# Patient Record
Sex: MALE | Race: WHITE | NOT HISPANIC OR LATINO | Employment: OTHER
[De-identification: names, ages, dates, MRNs, and addresses within clinical notes are randomized per-mention and may not be internally consistent; named-entity substitution may affect disease eponyms.]

---

## 2017-01-02 ENCOUNTER — RECORDS - HEALTHEAST (OUTPATIENT)
Dept: ADMINISTRATIVE | Facility: OTHER | Age: 72
End: 2017-01-02

## 2017-01-09 ENCOUNTER — COMMUNICATION - HEALTHEAST (OUTPATIENT)
Dept: NURSING | Facility: CLINIC | Age: 72
End: 2017-01-09

## 2017-03-12 ENCOUNTER — COMMUNICATION - HEALTHEAST (OUTPATIENT)
Dept: FAMILY MEDICINE | Facility: CLINIC | Age: 72
End: 2017-03-12

## 2017-03-12 DIAGNOSIS — E11.9 TYPE 2 DIABETES MELLITUS WITHOUT COMPLICATION (H): ICD-10-CM

## 2017-03-15 ENCOUNTER — RECORDS - HEALTHEAST (OUTPATIENT)
Dept: ADMINISTRATIVE | Facility: OTHER | Age: 72
End: 2017-03-15

## 2017-05-08 ENCOUNTER — COMMUNICATION - HEALTHEAST (OUTPATIENT)
Dept: FAMILY MEDICINE | Facility: CLINIC | Age: 72
End: 2017-05-08

## 2017-05-08 DIAGNOSIS — E11.9 TYPE 2 DIABETES MELLITUS WITHOUT COMPLICATION (H): ICD-10-CM

## 2017-05-10 ENCOUNTER — OFFICE VISIT - HEALTHEAST (OUTPATIENT)
Dept: FAMILY MEDICINE | Facility: CLINIC | Age: 72
End: 2017-05-10

## 2017-05-10 DIAGNOSIS — E78.00 PURE HYPERCHOLESTEROLEMIA: ICD-10-CM

## 2017-05-10 DIAGNOSIS — R80.9 PROTEINURIA: ICD-10-CM

## 2017-05-10 DIAGNOSIS — E11.9 TYPE 2 DIABETES MELLITUS WITHOUT COMPLICATION, WITHOUT LONG-TERM CURRENT USE OF INSULIN (H): ICD-10-CM

## 2017-05-10 DIAGNOSIS — N28.9 DISORDER OF KIDNEY AND URETER: ICD-10-CM

## 2017-05-10 DIAGNOSIS — I10 ESSENTIAL HYPERTENSION: ICD-10-CM

## 2017-05-10 DIAGNOSIS — G47.33 OSA ON CPAP: ICD-10-CM

## 2017-05-10 DIAGNOSIS — E66.9 OBESITY, UNSPECIFIED: ICD-10-CM

## 2017-05-10 LAB
HBA1C MFR BLD: 11.4 % (ref 3.5–6)
LDLC SERPL CALC-MCNC: 120 MG/DL

## 2017-05-10 ASSESSMENT — MIFFLIN-ST. JEOR: SCORE: 1944.87

## 2017-05-11 ENCOUNTER — COMMUNICATION - HEALTHEAST (OUTPATIENT)
Dept: FAMILY MEDICINE | Facility: CLINIC | Age: 72
End: 2017-05-11

## 2017-05-19 ENCOUNTER — AMBULATORY - HEALTHEAST (OUTPATIENT)
Dept: FAMILY MEDICINE | Facility: CLINIC | Age: 72
End: 2017-05-19

## 2017-05-19 ENCOUNTER — AMBULATORY - HEALTHEAST (OUTPATIENT)
Dept: EDUCATION SERVICES | Facility: CLINIC | Age: 72
End: 2017-05-19

## 2017-05-19 DIAGNOSIS — E11.9 TYPE 2 DIABETES MELLITUS WITHOUT COMPLICATION (H): ICD-10-CM

## 2017-05-19 DIAGNOSIS — E08.65 DIABETES MELLITUS DUE TO UNDERLYING CONDITION WITH HYPERGLYCEMIA (H): ICD-10-CM

## 2017-05-22 ENCOUNTER — COMMUNICATION - HEALTHEAST (OUTPATIENT)
Dept: FAMILY MEDICINE | Facility: CLINIC | Age: 72
End: 2017-05-22

## 2017-05-24 ENCOUNTER — AMBULATORY - HEALTHEAST (OUTPATIENT)
Dept: FAMILY MEDICINE | Facility: CLINIC | Age: 72
End: 2017-05-24

## 2017-05-31 ENCOUNTER — AMBULATORY - HEALTHEAST (OUTPATIENT)
Dept: FAMILY MEDICINE | Facility: CLINIC | Age: 72
End: 2017-05-31

## 2017-06-07 ENCOUNTER — AMBULATORY - HEALTHEAST (OUTPATIENT)
Dept: FAMILY MEDICINE | Facility: CLINIC | Age: 72
End: 2017-06-07

## 2017-06-07 ENCOUNTER — COMMUNICATION - HEALTHEAST (OUTPATIENT)
Dept: FAMILY MEDICINE | Facility: CLINIC | Age: 72
End: 2017-06-07

## 2017-06-14 ENCOUNTER — AMBULATORY - HEALTHEAST (OUTPATIENT)
Dept: EDUCATION SERVICES | Facility: CLINIC | Age: 72
End: 2017-06-14

## 2017-06-14 DIAGNOSIS — E11.9 TYPE 2 DIABETES MELLITUS WITHOUT COMPLICATION (H): ICD-10-CM

## 2017-06-14 DIAGNOSIS — E08.65 DIABETES MELLITUS DUE TO UNDERLYING CONDITION WITH HYPERGLYCEMIA (H): ICD-10-CM

## 2017-07-06 ENCOUNTER — AMBULATORY - HEALTHEAST (OUTPATIENT)
Dept: FAMILY MEDICINE | Facility: CLINIC | Age: 72
End: 2017-07-06

## 2017-08-28 ENCOUNTER — AMBULATORY - HEALTHEAST (OUTPATIENT)
Dept: EDUCATION SERVICES | Facility: CLINIC | Age: 72
End: 2017-08-28

## 2017-08-28 DIAGNOSIS — E08.65 DIABETES MELLITUS DUE TO UNDERLYING CONDITION WITH HYPERGLYCEMIA (H): ICD-10-CM

## 2017-08-31 ENCOUNTER — COMMUNICATION - HEALTHEAST (OUTPATIENT)
Dept: FAMILY MEDICINE | Facility: CLINIC | Age: 72
End: 2017-08-31

## 2017-08-31 ENCOUNTER — AMBULATORY - HEALTHEAST (OUTPATIENT)
Dept: LAB | Facility: CLINIC | Age: 72
End: 2017-08-31

## 2017-08-31 DIAGNOSIS — E08.65 DIABETES MELLITUS DUE TO UNDERLYING CONDITION WITH HYPERGLYCEMIA (H): ICD-10-CM

## 2017-08-31 LAB — HBA1C MFR BLD: 8.8 % (ref 3.5–6)

## 2017-10-18 ENCOUNTER — AMBULATORY - HEALTHEAST (OUTPATIENT)
Dept: EDUCATION SERVICES | Facility: CLINIC | Age: 72
End: 2017-10-18

## 2017-10-25 ENCOUNTER — OFFICE VISIT - HEALTHEAST (OUTPATIENT)
Dept: FAMILY MEDICINE | Facility: CLINIC | Age: 72
End: 2017-10-25

## 2017-10-25 DIAGNOSIS — E78.00 PURE HYPERCHOLESTEROLEMIA: ICD-10-CM

## 2017-10-25 DIAGNOSIS — Z12.11 COLON CANCER SCREENING: ICD-10-CM

## 2017-10-25 DIAGNOSIS — E11.9 TYPE 2 DIABETES MELLITUS WITHOUT COMPLICATION (H): ICD-10-CM

## 2017-10-25 DIAGNOSIS — R20.1 HYPESTHESIA: ICD-10-CM

## 2017-10-26 ENCOUNTER — COMMUNICATION - HEALTHEAST (OUTPATIENT)
Dept: FAMILY MEDICINE | Facility: CLINIC | Age: 72
End: 2017-10-26

## 2017-11-02 ENCOUNTER — AMBULATORY - HEALTHEAST (OUTPATIENT)
Dept: FAMILY MEDICINE | Facility: CLINIC | Age: 72
End: 2017-11-02

## 2017-11-02 ENCOUNTER — COMMUNICATION - HEALTHEAST (OUTPATIENT)
Dept: FAMILY MEDICINE | Facility: CLINIC | Age: 72
End: 2017-11-02

## 2017-11-03 ENCOUNTER — COMMUNICATION - HEALTHEAST (OUTPATIENT)
Dept: FAMILY MEDICINE | Facility: CLINIC | Age: 72
End: 2017-11-03

## 2017-11-03 DIAGNOSIS — E78.00 PURE HYPERCHOLESTEROLEMIA: ICD-10-CM

## 2017-11-06 ENCOUNTER — HOSPITAL ENCOUNTER (OUTPATIENT)
Dept: MRI IMAGING | Facility: HOSPITAL | Age: 72
Discharge: HOME | End: 2017-11-06
Attending: FAMILY MEDICINE

## 2017-11-06 DIAGNOSIS — R20.1 HYPESTHESIA: ICD-10-CM

## 2017-11-08 ENCOUNTER — COMMUNICATION - HEALTHEAST (OUTPATIENT)
Dept: FAMILY MEDICINE | Facility: CLINIC | Age: 72
End: 2017-11-08

## 2017-11-29 ENCOUNTER — RECORDS - HEALTHEAST (OUTPATIENT)
Dept: ADMINISTRATIVE | Facility: OTHER | Age: 72
End: 2017-11-29

## 2017-12-13 ENCOUNTER — COMMUNICATION - HEALTHEAST (OUTPATIENT)
Dept: FAMILY MEDICINE | Facility: CLINIC | Age: 72
End: 2017-12-13

## 2017-12-13 DIAGNOSIS — E11.9 DIABETES (H): ICD-10-CM

## 2018-01-02 ENCOUNTER — RECORDS - HEALTHEAST (OUTPATIENT)
Dept: ADMINISTRATIVE | Facility: OTHER | Age: 73
End: 2018-01-02

## 2018-01-04 ENCOUNTER — RECORDS - HEALTHEAST (OUTPATIENT)
Dept: ADMINISTRATIVE | Facility: OTHER | Age: 73
End: 2018-01-04

## 2018-01-08 ENCOUNTER — HOSPITAL ENCOUNTER (OUTPATIENT)
Dept: CARDIOLOGY | Facility: HOSPITAL | Age: 73
Discharge: HOME | End: 2018-01-08
Attending: PSYCHIATRY & NEUROLOGY

## 2018-01-08 DIAGNOSIS — I63.81 LACUNAR INFARCTION (H): ICD-10-CM

## 2018-01-08 DIAGNOSIS — I63.9 CEREBRAL INFARCTION, UNSPECIFIED (H): ICD-10-CM

## 2018-01-08 LAB
AORTIC ROOT: 3.6 CM
BSA FOR ECHO PROCEDURE: 2.45 M2
CV BLOOD PRESSURE: NORMAL MMHG
CV ECHO HEIGHT: 73.5 IN
CV ECHO WEIGHT: 256 LBS
DOP CALC LVOT AREA: 3.46 CM2
DOP CALC LVOT DIAMETER: 2.1 CM
DOP CALC LVOT PEAK VEL: 118 CM/S
DOP CALC LVOT STROKE VOLUME: 91.7 CM3
DOP CALC MV VTI: 40.7 CM
DOP CALCLVOT PEAK VEL VTI: 26.5 CM
EJECTION FRACTION: 57 % (ref 55–75)
FRACTIONAL SHORTENING: 49.5 % (ref 28–44)
INTERVENTRICULAR SEPTUM IN END DIASTOLE: 1.39 CM (ref 0.6–1)
IVS/PW RATIO: 1.1
LA AREA 1: 21.4 CM2
LA AREA 2: 27.2 CM2
LEFT ATRIUM LENGTH: 5.8 CM
LEFT ATRIUM SIZE: 4.1 CM
LEFT ATRIUM TO AORTIC ROOT RATIO: 1.14 NO UNITS
LEFT ATRIUM VOLUME INDEX: 34.8 ML/M2
LEFT ATRIUM VOLUME: 85.3 ML
LEFT VENTRICLE CARDIAC INDEX: 2.7 L/MIN/M2
LEFT VENTRICLE CARDIAC OUTPUT: 6.7 L/MIN
LEFT VENTRICLE DIASTOLIC VOLUME INDEX: 28.3 CM3/M2 (ref 34–74)
LEFT VENTRICLE DIASTOLIC VOLUME: 69.3 CM3 (ref 62–150)
LEFT VENTRICLE HEART RATE: 73 BPM
LEFT VENTRICLE MASS INDEX: 77.7 G/M2
LEFT VENTRICLE SYSTOLIC VOLUME INDEX: 12 CM3/M2 (ref 11–31)
LEFT VENTRICLE SYSTOLIC VOLUME: 29.5 CM3 (ref 21–61)
LEFT VENTRICULAR INTERNAL DIMENSION IN DIASTOLE: 4.04 CM (ref 4.2–5.8)
LEFT VENTRICULAR INTERNAL DIMENSION IN SYSTOLE: 2.04 CM (ref 2.5–4)
LEFT VENTRICULAR MASS: 190.4 G
LEFT VENTRICULAR OUTFLOW TRACT MEAN GRADIENT: 3 MMHG
LEFT VENTRICULAR OUTFLOW TRACT MEAN VELOCITY: 79.4 CM/S
LEFT VENTRICULAR OUTFLOW TRACT PEAK GRADIENT: 6 MMHG
LEFT VENTRICULAR POSTERIOR WALL IN END DIASTOLE: 1.22 CM (ref 0.6–1)
LV STROKE VOLUME INDEX: 37.4 ML/M2
MITRAL VALVE DECELERATION SLOPE: 3210 MM/S2
MITRAL VALVE E/A RATIO: 0.8
MITRAL VALVE MEAN INFLOW VELOCITY: 88.7 CM/S
MITRAL VALVE PEAK VELOCITY: 138 CM/S
MITRAL VALVE PRESSURE HALF-TIME: 111 MS
MV AREA VTI: 2.25 CM2
MV AVERAGE E/E' RATIO: 19.6 CM/S
MV DECELERATION TIME: 327 MS
MV E'TISSUE VEL-LAT: 6.09 CM/S
MV E'TISSUE VEL-MED: 4.93 CM/S
MV LATERAL E/E' RATIO: 17.7
MV MEAN GRADIENT: 4 MMHG
MV MEDIAL E/E' RATIO: 21.9
MV PEAK A VELOCITY: 135 CM/S
MV PEAK E VELOCITY: 108 CM/S
MV PEAK GRADIENT: 7.6 MMHG
MV VALVE AREA BY CONTINUITY EQUATION: 2.3 CM2
MV VALVE AREA PRESSURE 1/2 METHOD: 2 CM2
NUC REST DIASTOLIC VOLUME INDEX: 4096 LBS
NUC REST SYSTOLIC VOLUME INDEX: 73.5 IN
PR MAX PG: 5 MMHG
PR PEAK VELOCITY: 113 CM/S
TRICUSPID REGURGITATION PEAK PRESSURE GRADIENT: 19.9 MMHG
TRICUSPID VALVE PEAK REGURGITANT VELOCITY: 223 CM/S

## 2018-01-08 ASSESSMENT — MIFFLIN-ST. JEOR: SCORE: 1963.02

## 2018-01-10 ENCOUNTER — RECORDS - HEALTHEAST (OUTPATIENT)
Dept: ADMINISTRATIVE | Facility: OTHER | Age: 73
End: 2018-01-10

## 2018-01-30 ENCOUNTER — HOSPITAL ENCOUNTER (OUTPATIENT)
Dept: ULTRASOUND IMAGING | Facility: HOSPITAL | Age: 73
Discharge: HOME | End: 2018-01-30
Attending: PSYCHIATRY & NEUROLOGY

## 2018-01-30 DIAGNOSIS — I63.81 LACUNAR INFARCTION (H): ICD-10-CM

## 2018-01-30 DIAGNOSIS — I63.9 CEREBRAL INFARCTION, UNSPECIFIED (H): ICD-10-CM

## 2018-02-02 ENCOUNTER — RECORDS - HEALTHEAST (OUTPATIENT)
Dept: ADMINISTRATIVE | Facility: OTHER | Age: 73
End: 2018-02-02

## 2018-02-06 ENCOUNTER — RECORDS - HEALTHEAST (OUTPATIENT)
Dept: LAB | Facility: HOSPITAL | Age: 73
End: 2018-02-06

## 2018-02-06 LAB
CHOLEST SERPL-MCNC: 105 MG/DL
FASTING STATUS PATIENT QL REPORTED: YES
HDLC SERPL-MCNC: 35 MG/DL
LDLC SERPL CALC-MCNC: 49 MG/DL
TRIGL SERPL-MCNC: 103 MG/DL

## 2018-03-27 ENCOUNTER — RECORDS - HEALTHEAST (OUTPATIENT)
Dept: ADMINISTRATIVE | Facility: OTHER | Age: 73
End: 2018-03-27

## 2018-04-02 ENCOUNTER — RECORDS - HEALTHEAST (OUTPATIENT)
Dept: ADMINISTRATIVE | Facility: OTHER | Age: 73
End: 2018-04-02

## 2018-04-04 ENCOUNTER — COMMUNICATION - HEALTHEAST (OUTPATIENT)
Dept: FAMILY MEDICINE | Facility: CLINIC | Age: 73
End: 2018-04-04

## 2018-04-04 DIAGNOSIS — E11.9 DIABETES (H): ICD-10-CM

## 2018-05-27 ENCOUNTER — COMMUNICATION - HEALTHEAST (OUTPATIENT)
Dept: FAMILY MEDICINE | Facility: CLINIC | Age: 73
End: 2018-05-27

## 2018-06-01 ENCOUNTER — OFFICE VISIT - HEALTHEAST (OUTPATIENT)
Dept: FAMILY MEDICINE | Facility: CLINIC | Age: 73
End: 2018-06-01

## 2018-06-01 DIAGNOSIS — E78.00 PURE HYPERCHOLESTEROLEMIA: ICD-10-CM

## 2018-06-01 DIAGNOSIS — R80.9 PROTEINURIA: ICD-10-CM

## 2018-06-01 DIAGNOSIS — E11.9 TYPE 2 DIABETES MELLITUS WITHOUT COMPLICATION (H): ICD-10-CM

## 2018-06-01 DIAGNOSIS — I10 ESSENTIAL HYPERTENSION: ICD-10-CM

## 2018-06-01 DIAGNOSIS — N28.9 DISORDER OF KIDNEY AND URETER: ICD-10-CM

## 2018-06-01 LAB
ALBUMIN SERPL-MCNC: 3.7 G/DL (ref 3.5–5)
ALP SERPL-CCNC: 55 U/L (ref 45–120)
ALT SERPL W P-5'-P-CCNC: 30 U/L (ref 0–45)
ANION GAP SERPL CALCULATED.3IONS-SCNC: 10 MMOL/L (ref 5–18)
AST SERPL W P-5'-P-CCNC: 23 U/L (ref 0–40)
BILIRUB SERPL-MCNC: 0.5 MG/DL (ref 0–1)
BUN SERPL-MCNC: 26 MG/DL (ref 8–28)
CALCIUM SERPL-MCNC: 9.6 MG/DL (ref 8.5–10.5)
CHLORIDE BLD-SCNC: 103 MMOL/L (ref 98–107)
CO2 SERPL-SCNC: 27 MMOL/L (ref 22–31)
CREAT SERPL-MCNC: 1.43 MG/DL (ref 0.7–1.3)
CREAT UR-MCNC: 121.4 MG/DL
ERYTHROCYTE [DISTWIDTH] IN BLOOD BY AUTOMATED COUNT: 11.4 % (ref 11–14.5)
GFR SERPL CREATININE-BSD FRML MDRD: 49 ML/MIN/1.73M2
GLUCOSE BLD-MCNC: 117 MG/DL (ref 70–125)
HBA1C MFR BLD: 7.6 % (ref 3.5–6)
HCT VFR BLD AUTO: 39 % (ref 40–54)
HGB BLD-MCNC: 12.7 G/DL (ref 14–18)
LDLC SERPL CALC-MCNC: 44 MG/DL
MCH RBC QN AUTO: 33.4 PG (ref 27–34)
MCHC RBC AUTO-ENTMCNC: 32.6 G/DL (ref 32–36)
MCV RBC AUTO: 103 FL (ref 80–100)
MICROALBUMIN UR-MCNC: 0.99 MG/DL (ref 0–1.99)
MICROALBUMIN/CREAT UR: 8.2 MG/G
PLATELET # BLD AUTO: 137 THOU/UL (ref 140–440)
PMV BLD AUTO: 9.9 FL (ref 7–10)
POTASSIUM BLD-SCNC: 4.3 MMOL/L (ref 3.5–5)
PROT SERPL-MCNC: 6.8 G/DL (ref 6–8)
RBC # BLD AUTO: 3.8 MILL/UL (ref 4.4–6.2)
SODIUM SERPL-SCNC: 140 MMOL/L (ref 136–145)
WBC: 7.9 THOU/UL (ref 4–11)

## 2018-06-01 ASSESSMENT — MIFFLIN-ST. JEOR: SCORE: 1977.19

## 2018-06-04 ENCOUNTER — COMMUNICATION - HEALTHEAST (OUTPATIENT)
Dept: FAMILY MEDICINE | Facility: CLINIC | Age: 73
End: 2018-06-04

## 2018-06-19 ENCOUNTER — COMMUNICATION - HEALTHEAST (OUTPATIENT)
Dept: EDUCATION SERVICES | Facility: CLINIC | Age: 73
End: 2018-06-19

## 2018-06-27 ENCOUNTER — COMMUNICATION - HEALTHEAST (OUTPATIENT)
Dept: FAMILY MEDICINE | Facility: CLINIC | Age: 73
End: 2018-06-27

## 2018-06-27 DIAGNOSIS — E11.9 TYPE 2 DIABETES MELLITUS WITHOUT COMPLICATION, WITHOUT LONG-TERM CURRENT USE OF INSULIN (H): ICD-10-CM

## 2018-07-10 ENCOUNTER — COMMUNICATION - HEALTHEAST (OUTPATIENT)
Dept: FAMILY MEDICINE | Facility: CLINIC | Age: 73
End: 2018-07-10

## 2018-07-10 DIAGNOSIS — E11.9 DIABETES (H): ICD-10-CM

## 2018-07-17 ENCOUNTER — COMMUNICATION - HEALTHEAST (OUTPATIENT)
Dept: FAMILY MEDICINE | Facility: CLINIC | Age: 73
End: 2018-07-17

## 2018-07-18 ENCOUNTER — AMBULATORY - HEALTHEAST (OUTPATIENT)
Dept: EDUCATION SERVICES | Facility: CLINIC | Age: 73
End: 2018-07-18

## 2018-07-18 DIAGNOSIS — E08.65 DIABETES MELLITUS DUE TO UNDERLYING CONDITION WITH HYPERGLYCEMIA (H): ICD-10-CM

## 2018-10-25 ENCOUNTER — OFFICE VISIT - HEALTHEAST (OUTPATIENT)
Dept: EDUCATION SERVICES | Facility: CLINIC | Age: 73
End: 2018-10-25

## 2018-10-25 ENCOUNTER — AMBULATORY - HEALTHEAST (OUTPATIENT)
Dept: EDUCATION SERVICES | Facility: CLINIC | Age: 73
End: 2018-10-25

## 2018-10-25 DIAGNOSIS — E08.65 DIABETES MELLITUS DUE TO UNDERLYING CONDITION WITH HYPERGLYCEMIA (H): ICD-10-CM

## 2018-11-04 ENCOUNTER — COMMUNICATION - HEALTHEAST (OUTPATIENT)
Dept: FAMILY MEDICINE | Facility: CLINIC | Age: 73
End: 2018-11-04

## 2018-11-04 DIAGNOSIS — E78.00 PURE HYPERCHOLESTEROLEMIA: ICD-10-CM

## 2018-11-14 ENCOUNTER — RECORDS - HEALTHEAST (OUTPATIENT)
Dept: ADMINISTRATIVE | Facility: OTHER | Age: 73
End: 2018-11-14

## 2018-11-23 ENCOUNTER — RECORDS - HEALTHEAST (OUTPATIENT)
Dept: ADMINISTRATIVE | Facility: OTHER | Age: 73
End: 2018-11-23

## 2018-12-20 ENCOUNTER — COMMUNICATION - HEALTHEAST (OUTPATIENT)
Dept: FAMILY MEDICINE | Facility: CLINIC | Age: 73
End: 2018-12-20

## 2018-12-20 DIAGNOSIS — E11.9 DIABETES (H): ICD-10-CM

## 2019-02-11 ENCOUNTER — OFFICE VISIT - HEALTHEAST (OUTPATIENT)
Dept: EDUCATION SERVICES | Facility: CLINIC | Age: 74
End: 2019-02-11

## 2019-02-11 DIAGNOSIS — E11.9 TYPE 2 DIABETES MELLITUS WITHOUT COMPLICATION, WITHOUT LONG-TERM CURRENT USE OF INSULIN (H): ICD-10-CM

## 2019-02-13 ENCOUNTER — COMMUNICATION - HEALTHEAST (OUTPATIENT)
Dept: FAMILY MEDICINE | Facility: CLINIC | Age: 74
End: 2019-02-13

## 2019-02-15 ENCOUNTER — RECORDS - HEALTHEAST (OUTPATIENT)
Dept: ADMINISTRATIVE | Facility: OTHER | Age: 74
End: 2019-02-15

## 2019-03-04 ENCOUNTER — RECORDS - HEALTHEAST (OUTPATIENT)
Dept: ADMINISTRATIVE | Facility: OTHER | Age: 74
End: 2019-03-04

## 2019-03-05 ENCOUNTER — AMBULATORY - HEALTHEAST (OUTPATIENT)
Dept: EDUCATION SERVICES | Facility: CLINIC | Age: 74
End: 2019-03-05

## 2019-03-05 ENCOUNTER — COMMUNICATION - HEALTHEAST (OUTPATIENT)
Dept: EDUCATION SERVICES | Facility: CLINIC | Age: 74
End: 2019-03-05

## 2019-03-05 DIAGNOSIS — E11.9 TYPE 2 DIABETES MELLITUS WITHOUT COMPLICATION, WITHOUT LONG-TERM CURRENT USE OF INSULIN (H): ICD-10-CM

## 2019-03-07 ENCOUNTER — COMMUNICATION - HEALTHEAST (OUTPATIENT)
Dept: FAMILY MEDICINE | Facility: CLINIC | Age: 74
End: 2019-03-07

## 2019-03-07 ENCOUNTER — OFFICE VISIT - HEALTHEAST (OUTPATIENT)
Dept: FAMILY MEDICINE | Facility: CLINIC | Age: 74
End: 2019-03-07

## 2019-03-07 DIAGNOSIS — I10 ESSENTIAL HYPERTENSION: ICD-10-CM

## 2019-03-07 DIAGNOSIS — N28.9 DISORDER OF KIDNEY AND URETER: ICD-10-CM

## 2019-03-07 DIAGNOSIS — E66.01 CLASS 2 SEVERE OBESITY DUE TO EXCESS CALORIES WITH SERIOUS COMORBIDITY IN ADULT, UNSPECIFIED BMI (H): ICD-10-CM

## 2019-03-07 DIAGNOSIS — E11.9 TYPE 2 DIABETES MELLITUS WITHOUT COMPLICATION, WITH LONG-TERM CURRENT USE OF INSULIN (H): ICD-10-CM

## 2019-03-07 DIAGNOSIS — E78.00 PURE HYPERCHOLESTEROLEMIA: ICD-10-CM

## 2019-03-07 DIAGNOSIS — Z79.4 TYPE 2 DIABETES MELLITUS WITHOUT COMPLICATION, WITH LONG-TERM CURRENT USE OF INSULIN (H): ICD-10-CM

## 2019-03-07 DIAGNOSIS — E66.812 CLASS 2 SEVERE OBESITY DUE TO EXCESS CALORIES WITH SERIOUS COMORBIDITY IN ADULT, UNSPECIFIED BMI (H): ICD-10-CM

## 2019-03-07 LAB
ALBUMIN SERPL-MCNC: 3.7 G/DL (ref 3.5–5)
ALP SERPL-CCNC: 54 U/L (ref 45–120)
ALT SERPL W P-5'-P-CCNC: 25 U/L (ref 0–45)
ANION GAP SERPL CALCULATED.3IONS-SCNC: 10 MMOL/L (ref 5–18)
AST SERPL W P-5'-P-CCNC: 18 U/L (ref 0–40)
BILIRUB SERPL-MCNC: 0.6 MG/DL (ref 0–1)
BUN SERPL-MCNC: 23 MG/DL (ref 8–28)
CALCIUM SERPL-MCNC: 9.7 MG/DL (ref 8.5–10.5)
CHLORIDE BLD-SCNC: 104 MMOL/L (ref 98–107)
CO2 SERPL-SCNC: 26 MMOL/L (ref 22–31)
CREAT SERPL-MCNC: 1.59 MG/DL (ref 0.7–1.3)
GFR SERPL CREATININE-BSD FRML MDRD: 43 ML/MIN/1.73M2
GLUCOSE BLD-MCNC: 133 MG/DL (ref 70–125)
HBA1C MFR BLD: 6.9 % (ref 3.5–6)
POTASSIUM BLD-SCNC: 4.4 MMOL/L (ref 3.5–5)
PROT SERPL-MCNC: 6.9 G/DL (ref 6–8)
SODIUM SERPL-SCNC: 140 MMOL/L (ref 136–145)

## 2019-03-13 ENCOUNTER — RECORDS - HEALTHEAST (OUTPATIENT)
Dept: ADMINISTRATIVE | Facility: OTHER | Age: 74
End: 2019-03-13

## 2019-03-22 ENCOUNTER — AMBULATORY - HEALTHEAST (OUTPATIENT)
Dept: EDUCATION SERVICES | Facility: CLINIC | Age: 74
End: 2019-03-22

## 2019-03-22 ENCOUNTER — COMMUNICATION - HEALTHEAST (OUTPATIENT)
Dept: EDUCATION SERVICES | Facility: CLINIC | Age: 74
End: 2019-03-22

## 2019-03-22 DIAGNOSIS — E08.65 DIABETES MELLITUS DUE TO UNDERLYING CONDITION WITH HYPERGLYCEMIA (H): ICD-10-CM

## 2019-03-22 DIAGNOSIS — E11.9 DIABETES (H): ICD-10-CM

## 2019-03-22 DIAGNOSIS — E11.9 TYPE 2 DIABETES MELLITUS WITHOUT COMPLICATION, WITHOUT LONG-TERM CURRENT USE OF INSULIN (H): ICD-10-CM

## 2019-04-04 ENCOUNTER — RECORDS - HEALTHEAST (OUTPATIENT)
Dept: ADMINISTRATIVE | Facility: OTHER | Age: 74
End: 2019-04-04

## 2019-04-10 ENCOUNTER — RECORDS - HEALTHEAST (OUTPATIENT)
Dept: HEALTH INFORMATION MANAGEMENT | Facility: CLINIC | Age: 74
End: 2019-04-10

## 2019-04-26 ENCOUNTER — RECORDS - HEALTHEAST (OUTPATIENT)
Dept: ADMINISTRATIVE | Facility: OTHER | Age: 74
End: 2019-04-26

## 2019-04-29 ENCOUNTER — RECORDS - HEALTHEAST (OUTPATIENT)
Dept: ADMINISTRATIVE | Facility: OTHER | Age: 74
End: 2019-04-29

## 2019-04-30 ENCOUNTER — AMBULATORY - HEALTHEAST (OUTPATIENT)
Dept: ADMINISTRATIVE | Facility: CLINIC | Age: 74
End: 2019-04-30

## 2019-04-30 RX ORDER — ACETAMINOPHEN 500 MG
1000 TABLET ORAL EVERY 6 HOURS
Status: SHIPPED | COMMUNITY
Start: 2019-04-30

## 2019-05-02 ENCOUNTER — OFFICE VISIT - HEALTHEAST (OUTPATIENT)
Dept: GERIATRICS | Facility: CLINIC | Age: 74
End: 2019-05-02
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
## 2019-05-02 DIAGNOSIS — D62 ACUTE BLOOD LOSS ANEMIA: ICD-10-CM

## 2019-05-02 DIAGNOSIS — N17.9 AKI (ACUTE KIDNEY INJURY) (H): ICD-10-CM

## 2019-05-02 DIAGNOSIS — E11.9 TYPE 2 DIABETES MELLITUS WITHOUT COMPLICATION, WITH LONG-TERM CURRENT USE OF INSULIN (H): ICD-10-CM

## 2019-05-02 DIAGNOSIS — Z79.4 TYPE 2 DIABETES MELLITUS WITHOUT COMPLICATION, WITH LONG-TERM CURRENT USE OF INSULIN (H): ICD-10-CM

## 2019-05-03 ENCOUNTER — COMMUNICATION - HEALTHEAST (OUTPATIENT)
Dept: FAMILY MEDICINE | Facility: CLINIC | Age: 74
End: 2019-05-03

## 2019-05-03 ENCOUNTER — RECORDS - HEALTHEAST (OUTPATIENT)
Dept: LAB | Facility: CLINIC | Age: 74
End: 2019-05-03

## 2019-05-03 ENCOUNTER — COMMUNICATION - HEALTHEAST (OUTPATIENT)
Dept: GERIATRICS | Facility: CLINIC | Age: 74
End: 2019-05-03

## 2019-05-03 LAB
ANION GAP SERPL CALCULATED.3IONS-SCNC: 8 MMOL/L (ref 5–18)
BUN SERPL-MCNC: 16 MG/DL (ref 8–28)
CALCIUM SERPL-MCNC: 9.4 MG/DL (ref 8.5–10.5)
CHLORIDE BLD-SCNC: 106 MMOL/L (ref 98–107)
CO2 SERPL-SCNC: 26 MMOL/L (ref 22–31)
CREAT SERPL-MCNC: 1.32 MG/DL (ref 0.7–1.3)
GFR SERPL CREATININE-BSD FRML MDRD: 53 ML/MIN/1.73M2
GLUCOSE BLD-MCNC: 166 MG/DL (ref 70–125)
POTASSIUM BLD-SCNC: 4 MMOL/L (ref 3.5–5)
SODIUM SERPL-SCNC: 140 MMOL/L (ref 136–145)

## 2019-05-07 ENCOUNTER — OFFICE VISIT - HEALTHEAST (OUTPATIENT)
Dept: GERIATRICS | Facility: CLINIC | Age: 74
End: 2019-05-07

## 2019-05-07 DIAGNOSIS — G47.33 OSA ON CPAP: ICD-10-CM
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
## 2019-05-07 DIAGNOSIS — D62 ACUTE BLOOD LOSS ANEMIA: ICD-10-CM

## 2019-05-07 DIAGNOSIS — E11.9 TYPE 2 DIABETES MELLITUS WITHOUT COMPLICATION, WITH LONG-TERM CURRENT USE OF INSULIN (H): ICD-10-CM

## 2019-05-07 DIAGNOSIS — I10 ESSENTIAL HYPERTENSION: ICD-10-CM

## 2019-05-07 DIAGNOSIS — Z79.4 TYPE 2 DIABETES MELLITUS WITHOUT COMPLICATION, WITH LONG-TERM CURRENT USE OF INSULIN (H): ICD-10-CM

## 2019-05-09 ENCOUNTER — OFFICE VISIT - HEALTHEAST (OUTPATIENT)
Dept: GERIATRICS | Facility: CLINIC | Age: 74
End: 2019-05-09
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
## 2019-05-09 DIAGNOSIS — Z79.4 TYPE 2 DIABETES MELLITUS WITHOUT COMPLICATION, WITH LONG-TERM CURRENT USE OF INSULIN (H): ICD-10-CM

## 2019-05-09 DIAGNOSIS — E11.9 TYPE 2 DIABETES MELLITUS WITHOUT COMPLICATION, WITH LONG-TERM CURRENT USE OF INSULIN (H): ICD-10-CM

## 2019-05-09 DIAGNOSIS — R60.9 EDEMA, UNSPECIFIED TYPE: ICD-10-CM

## 2019-05-09 DIAGNOSIS — D62 ACUTE BLOOD LOSS ANEMIA: ICD-10-CM

## 2019-05-13 ENCOUNTER — OFFICE VISIT - HEALTHEAST (OUTPATIENT)
Dept: GERIATRICS | Facility: CLINIC | Age: 74
End: 2019-05-13

## 2019-05-13 DIAGNOSIS — R60.9 EDEMA, UNSPECIFIED TYPE: ICD-10-CM

## 2019-05-13 DIAGNOSIS — D62 ACUTE BLOOD LOSS ANEMIA: ICD-10-CM
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
## 2019-05-13 DIAGNOSIS — G47.33 OSA ON CPAP: ICD-10-CM

## 2019-05-13 DIAGNOSIS — E11.9 TYPE 2 DIABETES MELLITUS WITHOUT COMPLICATION, WITH LONG-TERM CURRENT USE OF INSULIN (H): ICD-10-CM

## 2019-05-13 DIAGNOSIS — Z79.4 TYPE 2 DIABETES MELLITUS WITHOUT COMPLICATION, WITH LONG-TERM CURRENT USE OF INSULIN (H): ICD-10-CM

## 2019-05-15 ENCOUNTER — COMMUNICATION - HEALTHEAST (OUTPATIENT)
Dept: GERIATRICS | Facility: CLINIC | Age: 74
End: 2019-05-15

## 2019-05-16 ENCOUNTER — RECORDS - HEALTHEAST (OUTPATIENT)
Dept: LAB | Facility: CLINIC | Age: 74
End: 2019-05-16

## 2019-05-20 ENCOUNTER — OFFICE VISIT - HEALTHEAST (OUTPATIENT)
Dept: GERIATRICS | Facility: CLINIC | Age: 74
End: 2019-05-20

## 2019-05-20 DIAGNOSIS — M16.9 OSTEOARTHRITIS OF HIP, UNSPECIFIED LATERALITY, UNSPECIFIED OSTEOARTHRITIS TYPE: ICD-10-CM

## 2019-05-20 DIAGNOSIS — I10 ESSENTIAL HYPERTENSION: ICD-10-CM

## 2019-05-20 DIAGNOSIS — E66.01 CLASS 2 SEVERE OBESITY DUE TO EXCESS CALORIES WITH SERIOUS COMORBIDITY IN ADULT, UNSPECIFIED BMI (H): ICD-10-CM

## 2019-05-20 DIAGNOSIS — D62 ACUTE BLOOD LOSS ANEMIA: ICD-10-CM

## 2019-05-20 DIAGNOSIS — Z79.4 TYPE 2 DIABETES MELLITUS WITHOUT COMPLICATION, WITH LONG-TERM CURRENT USE OF INSULIN (H): ICD-10-CM
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
## 2019-05-20 DIAGNOSIS — E11.9 TYPE 2 DIABETES MELLITUS WITHOUT COMPLICATION, WITH LONG-TERM CURRENT USE OF INSULIN (H): ICD-10-CM

## 2019-05-20 DIAGNOSIS — G47.33 OSA ON CPAP: ICD-10-CM

## 2019-05-20 DIAGNOSIS — E66.812 CLASS 2 SEVERE OBESITY DUE TO EXCESS CALORIES WITH SERIOUS COMORBIDITY IN ADULT, UNSPECIFIED BMI (H): ICD-10-CM

## 2019-05-20 DIAGNOSIS — R60.9 EDEMA, UNSPECIFIED TYPE: ICD-10-CM

## 2019-05-20 LAB
GAMMA INTERFERON BACKGROUND BLD IA-ACNC: 0.1 IU/ML
M TB IFN-G BLD-IMP: NEGATIVE
MITOGEN IGNF BCKGRD COR BLD-ACNC: 0 IU/ML
MITOGEN IGNF BCKGRD COR BLD-ACNC: 0 IU/ML
QTF INTERPRETATION: NORMAL
QTF MITOGEN - NIL: 7.2 IU/ML

## 2019-05-23 ENCOUNTER — OFFICE VISIT - HEALTHEAST (OUTPATIENT)
Dept: GERIATRICS | Facility: CLINIC | Age: 74
End: 2019-05-23

## 2019-05-23 DIAGNOSIS — Z79.4 TYPE 2 DIABETES MELLITUS WITHOUT COMPLICATION, WITH LONG-TERM CURRENT USE OF INSULIN (H): ICD-10-CM

## 2019-05-23 DIAGNOSIS — E11.9 TYPE 2 DIABETES MELLITUS WITHOUT COMPLICATION, WITH LONG-TERM CURRENT USE OF INSULIN (H): ICD-10-CM
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
## 2019-05-23 RX ORDER — DOCUSATE SODIUM 100 MG/1
100 CAPSULE, LIQUID FILLED ORAL 2 TIMES DAILY
Status: SHIPPED | COMMUNITY
Start: 2019-05-23 | End: 2022-01-21

## 2019-05-26 ENCOUNTER — COMMUNICATION - HEALTHEAST (OUTPATIENT)
Dept: FAMILY MEDICINE | Facility: CLINIC | Age: 74
End: 2019-05-26

## 2019-05-26 DIAGNOSIS — E78.00 PURE HYPERCHOLESTEROLEMIA: ICD-10-CM

## 2019-05-27 ENCOUNTER — AMBULATORY - HEALTHEAST (OUTPATIENT)
Dept: GERIATRICS | Facility: CLINIC | Age: 74
End: 2019-05-27

## 2019-05-28 ENCOUNTER — COMMUNICATION - HEALTHEAST (OUTPATIENT)
Dept: FAMILY MEDICINE | Facility: CLINIC | Age: 74
End: 2019-05-28

## 2019-05-28 ENCOUNTER — COMMUNICATION - HEALTHEAST (OUTPATIENT)
Dept: GERIATRICS | Facility: CLINIC | Age: 74
End: 2019-05-28

## 2019-05-30 ENCOUNTER — COMMUNICATION - HEALTHEAST (OUTPATIENT)
Dept: FAMILY MEDICINE | Facility: CLINIC | Age: 74
End: 2019-05-30

## 2019-06-03 ENCOUNTER — RECORDS - HEALTHEAST (OUTPATIENT)
Dept: ADMINISTRATIVE | Facility: OTHER | Age: 74
End: 2019-06-03

## 2019-06-03 ENCOUNTER — OFFICE VISIT - HEALTHEAST (OUTPATIENT)
Dept: FAMILY MEDICINE | Facility: CLINIC | Age: 74
End: 2019-06-03

## 2019-06-03 DIAGNOSIS — E78.00 PURE HYPERCHOLESTEROLEMIA: ICD-10-CM

## 2019-06-03 DIAGNOSIS — E11.9 TYPE 2 DIABETES MELLITUS WITHOUT COMPLICATION, WITH LONG-TERM CURRENT USE OF INSULIN (H): ICD-10-CM

## 2019-06-03 DIAGNOSIS — E08.65 DIABETES MELLITUS DUE TO UNDERLYING CONDITION WITH HYPERGLYCEMIA (H): ICD-10-CM

## 2019-06-03 DIAGNOSIS — N28.9 DISORDER OF KIDNEY AND URETER: ICD-10-CM

## 2019-06-03 DIAGNOSIS — Z79.4 TYPE 2 DIABETES MELLITUS WITHOUT COMPLICATION, WITH LONG-TERM CURRENT USE OF INSULIN (H): ICD-10-CM

## 2019-06-03 LAB
ALBUMIN SERPL-MCNC: 3.6 G/DL (ref 3.5–5)
ALP SERPL-CCNC: 91 U/L (ref 45–120)
ALT SERPL W P-5'-P-CCNC: 18 U/L (ref 0–45)
ANION GAP SERPL CALCULATED.3IONS-SCNC: 8 MMOL/L (ref 5–18)
AST SERPL W P-5'-P-CCNC: 16 U/L (ref 0–40)
BILIRUB SERPL-MCNC: 0.4 MG/DL (ref 0–1)
BUN SERPL-MCNC: 24 MG/DL (ref 8–28)
CALCIUM SERPL-MCNC: 10.2 MG/DL (ref 8.5–10.5)
CHLORIDE BLD-SCNC: 105 MMOL/L (ref 98–107)
CO2 SERPL-SCNC: 28 MMOL/L (ref 22–31)
CREAT SERPL-MCNC: 1.41 MG/DL (ref 0.7–1.3)
GFR SERPL CREATININE-BSD FRML MDRD: 49 ML/MIN/1.73M2
GLUCOSE BLD-MCNC: 171 MG/DL (ref 70–125)
HBA1C MFR BLD: 6.1 % (ref 3.5–6)
POTASSIUM BLD-SCNC: 4.6 MMOL/L (ref 3.5–5)
PROT SERPL-MCNC: 6.9 G/DL (ref 6–8)
SODIUM SERPL-SCNC: 141 MMOL/L (ref 136–145)

## 2019-06-11 ENCOUNTER — COMMUNICATION - HEALTHEAST (OUTPATIENT)
Dept: FAMILY MEDICINE | Facility: CLINIC | Age: 74
End: 2019-06-11

## 2019-06-11 DIAGNOSIS — E11.9 DIABETES (H): ICD-10-CM

## 2019-06-21 ENCOUNTER — RECORDS - HEALTHEAST (OUTPATIENT)
Dept: ADMINISTRATIVE | Facility: OTHER | Age: 74
End: 2019-06-21

## 2019-07-08 ENCOUNTER — OFFICE VISIT - HEALTHEAST (OUTPATIENT)
Dept: EDUCATION SERVICES | Facility: CLINIC | Age: 74
End: 2019-07-08

## 2019-07-08 DIAGNOSIS — Z79.4 TYPE 2 DIABETES MELLITUS WITHOUT COMPLICATION, WITH LONG-TERM CURRENT USE OF INSULIN (H): ICD-10-CM

## 2019-07-08 DIAGNOSIS — E11.9 TYPE 2 DIABETES MELLITUS WITHOUT COMPLICATION, WITH LONG-TERM CURRENT USE OF INSULIN (H): ICD-10-CM

## 2019-07-10 ENCOUNTER — OFFICE VISIT - HEALTHEAST (OUTPATIENT)
Dept: FAMILY MEDICINE | Facility: CLINIC | Age: 74
End: 2019-07-10

## 2019-07-10 DIAGNOSIS — N28.9 DISORDER OF KIDNEY AND URETER: ICD-10-CM

## 2019-07-10 DIAGNOSIS — E78.00 PURE HYPERCHOLESTEROLEMIA: ICD-10-CM

## 2019-07-10 DIAGNOSIS — E11.9 TYPE 2 DIABETES MELLITUS WITHOUT COMPLICATION, UNSPECIFIED WHETHER LONG TERM INSULIN USE (H): ICD-10-CM

## 2019-07-31 ENCOUNTER — RECORDS - HEALTHEAST (OUTPATIENT)
Dept: ADMINISTRATIVE | Facility: OTHER | Age: 74
End: 2019-07-31

## 2019-07-31 ENCOUNTER — OFFICE VISIT - HEALTHEAST (OUTPATIENT)
Dept: EDUCATION SERVICES | Facility: CLINIC | Age: 74
End: 2019-07-31

## 2019-07-31 DIAGNOSIS — E11.9 TYPE 2 DIABETES MELLITUS WITHOUT COMPLICATION, UNSPECIFIED WHETHER LONG TERM INSULIN USE (H): ICD-10-CM

## 2019-08-02 ENCOUNTER — RECORDS - HEALTHEAST (OUTPATIENT)
Dept: ADMINISTRATIVE | Facility: OTHER | Age: 74
End: 2019-08-02

## 2019-09-04 ENCOUNTER — OFFICE VISIT - HEALTHEAST (OUTPATIENT)
Dept: EDUCATION SERVICES | Facility: CLINIC | Age: 74
End: 2019-09-04

## 2019-09-04 DIAGNOSIS — E11.9 TYPE 2 DIABETES MELLITUS WITHOUT COMPLICATION, UNSPECIFIED WHETHER LONG TERM INSULIN USE (H): ICD-10-CM

## 2019-09-04 LAB — HBA1C MFR BLD: 7.7 % (ref 3.5–6)

## 2019-09-23 ENCOUNTER — COMMUNICATION - HEALTHEAST (OUTPATIENT)
Dept: FAMILY MEDICINE | Facility: CLINIC | Age: 74
End: 2019-09-23

## 2019-09-23 DIAGNOSIS — E11.9 DIABETES (H): ICD-10-CM

## 2019-10-21 ENCOUNTER — COMMUNICATION - HEALTHEAST (OUTPATIENT)
Dept: FAMILY MEDICINE | Facility: CLINIC | Age: 74
End: 2019-10-21

## 2019-10-21 DIAGNOSIS — E11.9 TYPE 2 DIABETES MELLITUS WITHOUT COMPLICATION, WITHOUT LONG-TERM CURRENT USE OF INSULIN (H): ICD-10-CM

## 2019-11-13 ENCOUNTER — AMBULATORY - HEALTHEAST (OUTPATIENT)
Dept: FAMILY MEDICINE | Facility: CLINIC | Age: 74
End: 2019-11-13

## 2019-11-13 DIAGNOSIS — E11.9 DIABETES (H): ICD-10-CM

## 2019-12-02 ENCOUNTER — OFFICE VISIT - HEALTHEAST (OUTPATIENT)
Dept: PODIATRY | Facility: CLINIC | Age: 74
End: 2019-12-02

## 2019-12-02 DIAGNOSIS — L60.0 INGROWN NAIL: ICD-10-CM

## 2019-12-02 DIAGNOSIS — L03.039 PARONYCHIA OF TOE, UNSPECIFIED LATERALITY: ICD-10-CM

## 2019-12-02 ASSESSMENT — MIFFLIN-ST. JEOR: SCORE: 1949.98

## 2019-12-09 ENCOUNTER — OFFICE VISIT - HEALTHEAST (OUTPATIENT)
Dept: PODIATRY | Facility: CLINIC | Age: 74
End: 2019-12-09

## 2019-12-09 DIAGNOSIS — L60.0 INGROWN NAIL: ICD-10-CM

## 2019-12-09 ASSESSMENT — MIFFLIN-ST. JEOR: SCORE: 1949.98

## 2020-02-08 ENCOUNTER — COMMUNICATION - HEALTHEAST (OUTPATIENT)
Dept: EDUCATION SERVICES | Facility: CLINIC | Age: 75
End: 2020-02-08

## 2020-02-08 DIAGNOSIS — E11.9 TYPE 2 DIABETES MELLITUS WITHOUT COMPLICATION, WITH LONG-TERM CURRENT USE OF INSULIN (H): ICD-10-CM

## 2020-02-08 DIAGNOSIS — Z79.4 TYPE 2 DIABETES MELLITUS WITHOUT COMPLICATION, WITH LONG-TERM CURRENT USE OF INSULIN (H): ICD-10-CM

## 2020-02-13 ENCOUNTER — OFFICE VISIT - HEALTHEAST (OUTPATIENT)
Dept: FAMILY MEDICINE | Facility: CLINIC | Age: 75
End: 2020-02-13

## 2020-02-13 ENCOUNTER — COMMUNICATION - HEALTHEAST (OUTPATIENT)
Dept: FAMILY MEDICINE | Facility: CLINIC | Age: 75
End: 2020-02-13

## 2020-02-13 DIAGNOSIS — N28.9 DISORDER OF KIDNEY AND URETER: ICD-10-CM

## 2020-02-13 DIAGNOSIS — E78.00 PURE HYPERCHOLESTEROLEMIA: ICD-10-CM

## 2020-02-13 DIAGNOSIS — Z79.4 TYPE 2 DIABETES MELLITUS WITHOUT COMPLICATION, WITH LONG-TERM CURRENT USE OF INSULIN (H): ICD-10-CM

## 2020-02-13 DIAGNOSIS — E11.9 TYPE 2 DIABETES MELLITUS WITHOUT COMPLICATION, WITH LONG-TERM CURRENT USE OF INSULIN (H): ICD-10-CM

## 2020-02-13 LAB
ALBUMIN SERPL-MCNC: 4 G/DL (ref 3.5–5)
ALP SERPL-CCNC: 63 U/L (ref 45–120)
ALT SERPL W P-5'-P-CCNC: 30 U/L (ref 0–45)
ANION GAP SERPL CALCULATED.3IONS-SCNC: 9 MMOL/L (ref 5–18)
AST SERPL W P-5'-P-CCNC: 22 U/L (ref 0–40)
BILIRUB SERPL-MCNC: 0.6 MG/DL (ref 0–1)
BUN SERPL-MCNC: 19 MG/DL (ref 8–28)
CALCIUM SERPL-MCNC: 10.3 MG/DL (ref 8.5–10.5)
CHLORIDE BLD-SCNC: 104 MMOL/L (ref 98–107)
CO2 SERPL-SCNC: 28 MMOL/L (ref 22–31)
CREAT SERPL-MCNC: 1.5 MG/DL (ref 0.7–1.3)
CREAT UR-MCNC: 176.5 MG/DL
GFR SERPL CREATININE-BSD FRML MDRD: 46 ML/MIN/1.73M2
GLUCOSE BLD-MCNC: 163 MG/DL (ref 70–125)
HBA1C MFR BLD: 6.4 % (ref 3.5–6)
LDLC SERPL CALC-MCNC: 42 MG/DL
MICROALBUMIN UR-MCNC: 4.3 MG/DL (ref 0–1.99)
MICROALBUMIN/CREAT UR: 24.4 MG/G
POTASSIUM BLD-SCNC: 4.8 MMOL/L (ref 3.5–5)
PROT SERPL-MCNC: 7.2 G/DL (ref 6–8)
SODIUM SERPL-SCNC: 141 MMOL/L (ref 136–145)

## 2020-02-14 ENCOUNTER — RECORDS - HEALTHEAST (OUTPATIENT)
Dept: ADMINISTRATIVE | Facility: OTHER | Age: 75
End: 2020-02-14

## 2020-02-14 ENCOUNTER — COMMUNICATION - HEALTHEAST (OUTPATIENT)
Dept: FAMILY MEDICINE | Facility: CLINIC | Age: 75
End: 2020-02-14

## 2020-02-14 DIAGNOSIS — E11.9 TYPE 2 DIABETES MELLITUS WITHOUT COMPLICATION, WITHOUT LONG-TERM CURRENT USE OF INSULIN (H): ICD-10-CM

## 2020-02-16 ENCOUNTER — COMMUNICATION - HEALTHEAST (OUTPATIENT)
Dept: FAMILY MEDICINE | Facility: CLINIC | Age: 75
End: 2020-02-16

## 2020-02-16 DIAGNOSIS — E11.9 TYPE 2 DIABETES MELLITUS WITHOUT COMPLICATION, WITHOUT LONG-TERM CURRENT USE OF INSULIN (H): ICD-10-CM

## 2020-02-26 ENCOUNTER — COMMUNICATION - HEALTHEAST (OUTPATIENT)
Dept: FAMILY MEDICINE | Facility: CLINIC | Age: 75
End: 2020-02-26

## 2020-02-26 DIAGNOSIS — E78.00 PURE HYPERCHOLESTEROLEMIA: ICD-10-CM

## 2020-03-18 ENCOUNTER — RECORDS - HEALTHEAST (OUTPATIENT)
Dept: ADMINISTRATIVE | Facility: OTHER | Age: 75
End: 2020-03-18

## 2020-04-20 ENCOUNTER — COMMUNICATION - HEALTHEAST (OUTPATIENT)
Dept: FAMILY MEDICINE | Facility: CLINIC | Age: 75
End: 2020-04-20

## 2020-04-20 DIAGNOSIS — E11.9 TYPE 2 DIABETES MELLITUS WITHOUT COMPLICATION, WITHOUT LONG-TERM CURRENT USE OF INSULIN (H): ICD-10-CM

## 2020-04-21 ENCOUNTER — COMMUNICATION - HEALTHEAST (OUTPATIENT)
Dept: PHARMACY | Facility: CLINIC | Age: 75
End: 2020-04-21

## 2020-04-21 DIAGNOSIS — E11.9 TYPE 2 DIABETES MELLITUS WITHOUT COMPLICATION, WITHOUT LONG-TERM CURRENT USE OF INSULIN (H): ICD-10-CM

## 2020-04-22 ENCOUNTER — COMMUNICATION - HEALTHEAST (OUTPATIENT)
Dept: FAMILY MEDICINE | Facility: CLINIC | Age: 75
End: 2020-04-22

## 2020-04-22 DIAGNOSIS — E11.9 TYPE 2 DIABETES MELLITUS WITHOUT COMPLICATION, WITHOUT LONG-TERM CURRENT USE OF INSULIN (H): ICD-10-CM

## 2020-04-24 ENCOUNTER — COMMUNICATION - HEALTHEAST (OUTPATIENT)
Dept: FAMILY MEDICINE | Facility: CLINIC | Age: 75
End: 2020-04-24

## 2020-04-24 DIAGNOSIS — E11.9 TYPE 2 DIABETES MELLITUS WITHOUT COMPLICATION, WITHOUT LONG-TERM CURRENT USE OF INSULIN (H): ICD-10-CM

## 2020-07-06 ENCOUNTER — COMMUNICATION - HEALTHEAST (OUTPATIENT)
Dept: EDUCATION SERVICES | Facility: CLINIC | Age: 75
End: 2020-07-06

## 2020-07-06 DIAGNOSIS — Z79.4 TYPE 2 DIABETES MELLITUS WITHOUT COMPLICATION, WITH LONG-TERM CURRENT USE OF INSULIN (H): ICD-10-CM

## 2020-07-06 DIAGNOSIS — E11.9 TYPE 2 DIABETES MELLITUS WITHOUT COMPLICATION, WITH LONG-TERM CURRENT USE OF INSULIN (H): ICD-10-CM

## 2020-07-13 ENCOUNTER — COMMUNICATION - HEALTHEAST (OUTPATIENT)
Dept: FAMILY MEDICINE | Facility: CLINIC | Age: 75
End: 2020-07-13

## 2020-07-13 DIAGNOSIS — E08.65 DIABETES MELLITUS DUE TO UNDERLYING CONDITION WITH HYPERGLYCEMIA (H): ICD-10-CM

## 2020-07-20 ENCOUNTER — RECORDS - HEALTHEAST (OUTPATIENT)
Dept: ADMINISTRATIVE | Facility: OTHER | Age: 75
End: 2020-07-20

## 2020-07-20 LAB — RETINOPATHY: NEGATIVE

## 2020-07-25 ENCOUNTER — COMMUNICATION - HEALTHEAST (OUTPATIENT)
Dept: FAMILY MEDICINE | Facility: CLINIC | Age: 75
End: 2020-07-25

## 2020-07-25 DIAGNOSIS — E08.65 DIABETES MELLITUS DUE TO UNDERLYING CONDITION WITH HYPERGLYCEMIA (H): ICD-10-CM

## 2020-08-03 ENCOUNTER — OFFICE VISIT - HEALTHEAST (OUTPATIENT)
Dept: FAMILY MEDICINE | Facility: CLINIC | Age: 75
End: 2020-08-03

## 2020-08-03 DIAGNOSIS — I10 ESSENTIAL HYPERTENSION: ICD-10-CM

## 2020-08-03 DIAGNOSIS — Z79.4 TYPE 2 DIABETES MELLITUS WITHOUT COMPLICATION, WITH LONG-TERM CURRENT USE OF INSULIN (H): ICD-10-CM

## 2020-08-03 DIAGNOSIS — E11.9 TYPE 2 DIABETES MELLITUS WITHOUT COMPLICATION, WITH LONG-TERM CURRENT USE OF INSULIN (H): ICD-10-CM

## 2020-08-03 DIAGNOSIS — M16.9 OSTEOARTHRITIS OF HIP, UNSPECIFIED LATERALITY, UNSPECIFIED OSTEOARTHRITIS TYPE: ICD-10-CM

## 2020-08-03 DIAGNOSIS — M79.652 PAIN OF LEFT THIGH: ICD-10-CM

## 2020-08-03 DIAGNOSIS — E78.00 PURE HYPERCHOLESTEROLEMIA: ICD-10-CM

## 2020-08-03 LAB
ANION GAP SERPL CALCULATED.3IONS-SCNC: 7 MMOL/L (ref 5–18)
BUN SERPL-MCNC: 19 MG/DL (ref 8–28)
CALCIUM SERPL-MCNC: 9.8 MG/DL (ref 8.5–10.5)
CHLORIDE BLD-SCNC: 104 MMOL/L (ref 98–107)
CO2 SERPL-SCNC: 29 MMOL/L (ref 22–31)
CREAT SERPL-MCNC: 1.44 MG/DL (ref 0.7–1.3)
GFR SERPL CREATININE-BSD FRML MDRD: 48 ML/MIN/1.73M2
GLUCOSE BLD-MCNC: 120 MG/DL (ref 70–125)
HBA1C MFR BLD: 6.7 % (ref 3.5–6)
POTASSIUM BLD-SCNC: 4.7 MMOL/L (ref 3.5–5)
SODIUM SERPL-SCNC: 140 MMOL/L (ref 136–145)

## 2020-08-03 RX ORDER — ADHESIVE BANDAGE 3/4"
BANDAGE TOPICAL
Qty: 1 EACH | Refills: 0 | Status: SHIPPED | OUTPATIENT
Start: 2020-08-03

## 2020-08-11 ENCOUNTER — RECORDS - HEALTHEAST (OUTPATIENT)
Dept: HEALTH INFORMATION MANAGEMENT | Facility: CLINIC | Age: 75
End: 2020-08-11

## 2020-08-12 ENCOUNTER — RECORDS - HEALTHEAST (OUTPATIENT)
Dept: ADMINISTRATIVE | Facility: OTHER | Age: 75
End: 2020-08-12

## 2020-08-12 ENCOUNTER — COMMUNICATION - HEALTHEAST (OUTPATIENT)
Dept: FAMILY MEDICINE | Facility: CLINIC | Age: 75
End: 2020-08-12

## 2020-09-08 ENCOUNTER — COMMUNICATION - HEALTHEAST (OUTPATIENT)
Dept: FAMILY MEDICINE | Facility: CLINIC | Age: 75
End: 2020-09-08

## 2020-09-08 DIAGNOSIS — E08.65 DIABETES MELLITUS DUE TO UNDERLYING CONDITION WITH HYPERGLYCEMIA (H): ICD-10-CM

## 2020-10-07 ENCOUNTER — RECORDS - HEALTHEAST (OUTPATIENT)
Dept: ADMINISTRATIVE | Facility: OTHER | Age: 75
End: 2020-10-07

## 2020-10-15 ENCOUNTER — COMMUNICATION - HEALTHEAST (OUTPATIENT)
Dept: FAMILY MEDICINE | Facility: CLINIC | Age: 75
End: 2020-10-15

## 2020-10-15 DIAGNOSIS — E11.9 TYPE 2 DIABETES MELLITUS WITHOUT COMPLICATION, WITHOUT LONG-TERM CURRENT USE OF INSULIN (H): ICD-10-CM

## 2020-10-17 RX ORDER — PIOGLITAZONEHYDROCHLORIDE 30 MG/1
30 TABLET ORAL DAILY
Qty: 90 TABLET | Refills: 3 | Status: SHIPPED | OUTPATIENT
Start: 2020-10-17 | End: 2021-10-04

## 2020-10-29 ENCOUNTER — COMMUNICATION - HEALTHEAST (OUTPATIENT)
Dept: FAMILY MEDICINE | Facility: CLINIC | Age: 75
End: 2020-10-29

## 2020-10-29 DIAGNOSIS — E11.9 TYPE 2 DIABETES MELLITUS WITHOUT COMPLICATION, WITHOUT LONG-TERM CURRENT USE OF INSULIN (H): ICD-10-CM

## 2020-10-29 DIAGNOSIS — E11.9 DIABETES (H): ICD-10-CM

## 2020-11-19 ENCOUNTER — RECORDS - HEALTHEAST (OUTPATIENT)
Dept: ADMINISTRATIVE | Facility: OTHER | Age: 75
End: 2020-11-19

## 2020-12-07 ENCOUNTER — COMMUNICATION - HEALTHEAST (OUTPATIENT)
Dept: FAMILY MEDICINE | Facility: CLINIC | Age: 75
End: 2020-12-07

## 2020-12-07 DIAGNOSIS — E11.9 TYPE 2 DIABETES MELLITUS WITHOUT COMPLICATION, WITH LONG-TERM CURRENT USE OF INSULIN (H): ICD-10-CM

## 2020-12-07 DIAGNOSIS — Z79.4 TYPE 2 DIABETES MELLITUS WITHOUT COMPLICATION, WITH LONG-TERM CURRENT USE OF INSULIN (H): ICD-10-CM

## 2021-01-04 ENCOUNTER — RECORDS - HEALTHEAST (OUTPATIENT)
Dept: ADMINISTRATIVE | Facility: OTHER | Age: 76
End: 2021-01-04

## 2021-01-21 ENCOUNTER — OFFICE VISIT - HEALTHEAST (OUTPATIENT)
Dept: FAMILY MEDICINE | Facility: CLINIC | Age: 76
End: 2021-01-21

## 2021-01-21 DIAGNOSIS — E11.9 TYPE 2 DIABETES MELLITUS WITHOUT COMPLICATION, WITH LONG-TERM CURRENT USE OF INSULIN (H): ICD-10-CM

## 2021-01-21 DIAGNOSIS — Z79.4 TYPE 2 DIABETES MELLITUS WITHOUT COMPLICATION, WITH LONG-TERM CURRENT USE OF INSULIN (H): ICD-10-CM

## 2021-01-21 DIAGNOSIS — I10 ESSENTIAL HYPERTENSION: ICD-10-CM

## 2021-01-21 DIAGNOSIS — Z72.0 CHEWING TOBACCO USE: ICD-10-CM

## 2021-01-21 DIAGNOSIS — R80.9 PROTEINURIA, UNSPECIFIED TYPE: ICD-10-CM

## 2021-01-21 DIAGNOSIS — N28.9 DISORDER OF KIDNEY AND URETER: ICD-10-CM

## 2021-01-21 DIAGNOSIS — N50.89 MASS OF LEFT TESTICLE: ICD-10-CM

## 2021-01-21 LAB
ANION GAP SERPL CALCULATED.3IONS-SCNC: 10 MMOL/L (ref 5–18)
BUN SERPL-MCNC: 23 MG/DL (ref 8–28)
CALCIUM SERPL-MCNC: 9.4 MG/DL (ref 8.5–10.5)
CHLORIDE BLD-SCNC: 104 MMOL/L (ref 98–107)
CO2 SERPL-SCNC: 27 MMOL/L (ref 22–31)
CREAT SERPL-MCNC: 1.62 MG/DL (ref 0.7–1.3)
CREAT UR-MCNC: 153.5 MG/DL
GFR SERPL CREATININE-BSD FRML MDRD: 42 ML/MIN/1.73M2
GLUCOSE BLD-MCNC: 138 MG/DL (ref 70–125)
HBA1C MFR BLD: 6.6 %
MICROALBUMIN UR-MCNC: 1.79 MG/DL (ref 0–1.99)
MICROALBUMIN/CREAT UR: 11.7 MG/G
POTASSIUM BLD-SCNC: 4.6 MMOL/L (ref 3.5–5)
SODIUM SERPL-SCNC: 141 MMOL/L (ref 136–145)

## 2021-01-21 ASSESSMENT — MIFFLIN-ST. JEOR: SCORE: 2021.13

## 2021-01-28 ENCOUNTER — COMMUNICATION - HEALTHEAST (OUTPATIENT)
Dept: FAMILY MEDICINE | Facility: CLINIC | Age: 76
End: 2021-01-28

## 2021-01-28 DIAGNOSIS — E11.9 TYPE 2 DIABETES MELLITUS WITHOUT COMPLICATION, WITHOUT LONG-TERM CURRENT USE OF INSULIN (H): ICD-10-CM

## 2021-02-04 ENCOUNTER — COMMUNICATION - HEALTHEAST (OUTPATIENT)
Dept: OTHER | Facility: CLINIC | Age: 76
End: 2021-02-04

## 2021-02-04 DIAGNOSIS — E11.9 TYPE 2 DIABETES MELLITUS WITHOUT COMPLICATION, WITHOUT LONG-TERM CURRENT USE OF INSULIN (H): ICD-10-CM

## 2021-02-05 ENCOUNTER — COMMUNICATION - HEALTHEAST (OUTPATIENT)
Dept: FAMILY MEDICINE | Facility: CLINIC | Age: 76
End: 2021-02-05

## 2021-02-05 ENCOUNTER — RECORDS - HEALTHEAST (OUTPATIENT)
Dept: ADMINISTRATIVE | Facility: OTHER | Age: 76
End: 2021-02-05

## 2021-02-05 DIAGNOSIS — Z79.4 TYPE 2 DIABETES MELLITUS WITHOUT COMPLICATION, WITH LONG-TERM CURRENT USE OF INSULIN (H): ICD-10-CM

## 2021-02-05 DIAGNOSIS — E11.9 TYPE 2 DIABETES MELLITUS WITHOUT COMPLICATION, WITH LONG-TERM CURRENT USE OF INSULIN (H): ICD-10-CM

## 2021-02-05 RX ORDER — FLASH GLUCOSE SCANNING READER
1 EACH MISCELLANEOUS
Qty: 2 EACH | Refills: 5 | Status: SHIPPED | OUTPATIENT
Start: 2021-02-05 | End: 2022-07-17

## 2021-03-02 ENCOUNTER — COMMUNICATION - HEALTHEAST (OUTPATIENT)
Dept: FAMILY MEDICINE | Facility: CLINIC | Age: 76
End: 2021-03-02

## 2021-03-02 DIAGNOSIS — E78.00 PURE HYPERCHOLESTEROLEMIA: ICD-10-CM

## 2021-03-03 RX ORDER — ATORVASTATIN CALCIUM 40 MG/1
TABLET, FILM COATED ORAL
Qty: 90 TABLET | Refills: 3 | Status: SHIPPED | OUTPATIENT
Start: 2021-03-03 | End: 2022-02-14

## 2021-03-04 ENCOUNTER — AMBULATORY - HEALTHEAST (OUTPATIENT)
Dept: FAMILY MEDICINE | Facility: CLINIC | Age: 76
End: 2021-03-04

## 2021-03-04 DIAGNOSIS — E11.9 TYPE 2 DIABETES MELLITUS WITHOUT COMPLICATION, WITH LONG-TERM CURRENT USE OF INSULIN (H): ICD-10-CM

## 2021-03-04 DIAGNOSIS — Z79.4 TYPE 2 DIABETES MELLITUS WITHOUT COMPLICATION, WITH LONG-TERM CURRENT USE OF INSULIN (H): ICD-10-CM

## 2021-03-10 ENCOUNTER — COMMUNICATION - HEALTHEAST (OUTPATIENT)
Dept: FAMILY MEDICINE | Facility: CLINIC | Age: 76
End: 2021-03-10

## 2021-03-10 DIAGNOSIS — E08.65 DIABETES MELLITUS DUE TO UNDERLYING CONDITION WITH HYPERGLYCEMIA (H): ICD-10-CM

## 2021-03-10 RX ORDER — INSULIN DEGLUDEC 100 U/ML
INJECTION, SOLUTION SUBCUTANEOUS
Qty: 15 ML | Refills: 3 | Status: SHIPPED | OUTPATIENT
Start: 2021-03-10 | End: 2021-11-29

## 2021-04-01 ENCOUNTER — COMMUNICATION - HEALTHEAST (OUTPATIENT)
Dept: FAMILY MEDICINE | Facility: CLINIC | Age: 76
End: 2021-04-01

## 2021-04-01 DIAGNOSIS — Z79.4 TYPE 2 DIABETES MELLITUS WITHOUT COMPLICATION, WITH LONG-TERM CURRENT USE OF INSULIN (H): ICD-10-CM

## 2021-04-01 DIAGNOSIS — E11.9 TYPE 2 DIABETES MELLITUS WITHOUT COMPLICATION, WITH LONG-TERM CURRENT USE OF INSULIN (H): ICD-10-CM

## 2021-04-01 DIAGNOSIS — E78.00 PURE HYPERCHOLESTEROLEMIA: ICD-10-CM

## 2021-04-05 ENCOUNTER — AMBULATORY - HEALTHEAST (OUTPATIENT)
Dept: FAMILY MEDICINE | Facility: CLINIC | Age: 76
End: 2021-04-05

## 2021-04-05 ENCOUNTER — COMMUNICATION - HEALTHEAST (OUTPATIENT)
Dept: FAMILY MEDICINE | Facility: CLINIC | Age: 76
End: 2021-04-05

## 2021-04-05 DIAGNOSIS — G47.33 OSA ON CPAP: ICD-10-CM

## 2021-04-12 ENCOUNTER — AMBULATORY - HEALTHEAST (OUTPATIENT)
Dept: LAB | Facility: CLINIC | Age: 76
End: 2021-04-12

## 2021-04-12 DIAGNOSIS — Z79.4 TYPE 2 DIABETES MELLITUS WITHOUT COMPLICATION, WITH LONG-TERM CURRENT USE OF INSULIN (H): ICD-10-CM

## 2021-04-12 DIAGNOSIS — E11.9 TYPE 2 DIABETES MELLITUS WITHOUT COMPLICATION, WITH LONG-TERM CURRENT USE OF INSULIN (H): ICD-10-CM

## 2021-04-12 DIAGNOSIS — E78.00 PURE HYPERCHOLESTEROLEMIA: ICD-10-CM

## 2021-04-12 LAB
ANION GAP SERPL CALCULATED.3IONS-SCNC: 9 MMOL/L (ref 5–18)
BUN SERPL-MCNC: 24 MG/DL (ref 8–28)
CALCIUM SERPL-MCNC: 9 MG/DL (ref 8.5–10.5)
CHLORIDE BLD-SCNC: 104 MMOL/L (ref 98–107)
CHOLEST SERPL-MCNC: 95 MG/DL
CO2 SERPL-SCNC: 28 MMOL/L (ref 22–31)
CREAT SERPL-MCNC: 1.44 MG/DL (ref 0.7–1.3)
FASTING STATUS PATIENT QL REPORTED: YES
GFR SERPL CREATININE-BSD FRML MDRD: 48 ML/MIN/1.73M2
GLUCOSE BLD-MCNC: 117 MG/DL (ref 70–125)
HBA1C MFR BLD: 6.6 %
HDLC SERPL-MCNC: 33 MG/DL
LDLC SERPL CALC-MCNC: 46 MG/DL
POTASSIUM BLD-SCNC: 4.1 MMOL/L (ref 3.5–5)
SODIUM SERPL-SCNC: 141 MMOL/L (ref 136–145)
TRIGL SERPL-MCNC: 82 MG/DL

## 2021-04-13 ENCOUNTER — COMMUNICATION - HEALTHEAST (OUTPATIENT)
Dept: FAMILY MEDICINE | Facility: CLINIC | Age: 76
End: 2021-04-13

## 2021-04-13 DIAGNOSIS — E11.9 TYPE 2 DIABETES MELLITUS WITHOUT COMPLICATION, WITH LONG-TERM CURRENT USE OF INSULIN (H): ICD-10-CM

## 2021-04-13 DIAGNOSIS — Z79.4 TYPE 2 DIABETES MELLITUS WITHOUT COMPLICATION, WITH LONG-TERM CURRENT USE OF INSULIN (H): ICD-10-CM

## 2021-04-14 RX ORDER — LANCETS
EACH MISCELLANEOUS
Qty: 100 EACH | Refills: 3 | Status: SHIPPED | OUTPATIENT
Start: 2021-04-14

## 2021-05-07 ENCOUNTER — COMMUNICATION - HEALTHEAST (OUTPATIENT)
Dept: FAMILY MEDICINE | Facility: CLINIC | Age: 76
End: 2021-05-07

## 2021-05-07 DIAGNOSIS — E11.9 TYPE 2 DIABETES MELLITUS WITHOUT COMPLICATION, WITHOUT LONG-TERM CURRENT USE OF INSULIN (H): ICD-10-CM

## 2021-05-07 RX ORDER — PEN NEEDLE, DIABETIC 32GX 5/32"
NEEDLE, DISPOSABLE MISCELLANEOUS
Qty: 300 EACH | Refills: 2 | Status: SHIPPED | OUTPATIENT
Start: 2021-05-07 | End: 2021-12-20

## 2021-05-14 ENCOUNTER — COMMUNICATION - HEALTHEAST (OUTPATIENT)
Dept: FAMILY MEDICINE | Facility: CLINIC | Age: 76
End: 2021-05-14

## 2021-05-14 DIAGNOSIS — Z79.4 TYPE 2 DIABETES MELLITUS WITHOUT COMPLICATION, WITH LONG-TERM CURRENT USE OF INSULIN (H): ICD-10-CM

## 2021-05-14 DIAGNOSIS — E11.9 TYPE 2 DIABETES MELLITUS WITHOUT COMPLICATION, WITH LONG-TERM CURRENT USE OF INSULIN (H): ICD-10-CM

## 2021-05-15 RX ORDER — FLASH GLUCOSE SENSOR
1 KIT MISCELLANEOUS
Qty: 6 KIT | Refills: 3 | Status: SHIPPED | OUTPATIENT
Start: 2021-05-15 | End: 2021-07-15

## 2021-05-28 ENCOUNTER — RECORDS - HEALTHEAST (OUTPATIENT)
Dept: ADMINISTRATIVE | Facility: CLINIC | Age: 76
End: 2021-05-28
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
## 2021-05-28 NOTE — TELEPHONE ENCOUNTER
[facility] Nurse Triage Telephone Note      Provider: MD Gabriel Alden  Facility: [facility]    Facility Type: TCU    Caller: Carleen  Call Back Number:  [phone]    Allergies: Patient has no known allergies.    Reason for call: Nurse calling to report BMP results.  MD stated that once creatinine was below 1.4, he could re-start Metformin.  He's also currently on Actos and Novolog.       Verbal Order/Direction given by Provider: Re-start Metformin XR 500mg Q AM.      Provider giving order: MD Gabriel Alden    Verbal order given to: Carleen Coreas, RN
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
## 2021-05-28 NOTE — TELEPHONE ENCOUNTER
"Called and left a message to check to see if Patient drop off form or faxed it to the clinic.   \" Okay to relay message\"  Will Check with CA if she has seen forms.   "
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
## 2021-05-28 NOTE — TELEPHONE ENCOUNTER
Dr. Purvis did fill out the form but we are missing a signature from the patient so in this case, I am unable to mail form out. I called the spouse and informed her that we need a signature from the patient before I can mail it to the home. Per spouse, she is trying to be proactive so with instead of mailing this form out to their home, she is going to see if the surgeon will be willing to fill out the form instead. Pt's appt with the surgeon is this Wednesday per spouse. I informed her that I will hold onto the form for x1 week and if I don't hear back from the pt or spouse then I will disregard the form. Per spouse agreed.

## 2021-05-28 NOTE — TELEPHONE ENCOUNTER
[facility] Nurse Triage Telephone Note      Provider: MISBAH Ford  Facility: [facility]    Facility Type: TCU    Caller: Ale  Call Back Number:  [phone]    Allergies: Patient has no known allergies.    Reason for call: Request order for Quanteferon Gold test. (Pt was given mantoux test  From bottle of Tubersol that wasn't dated.)     Verbal Order/Direction given by Provider: Ok to get Quanteferon Gold test.    Provider giving order: MISBAH Ford    Verbal order given to: Ale Aguilar RN
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
## 2021-05-29 NOTE — TELEPHONE ENCOUNTER
RN cannot approve Refill Request    RN can NOT refill this medication overdue for office visits and/or labs.    Matteo Martinez, [facility] Triage/Med Refill 6/12/2019    Requested Prescriptions   Pending Prescriptions Disp Refills     metFORMIN (GLUCOPHAGE) 500 MG tablet [Pharmacy Med Name: METFORMIN 500MG TABLETS] 180 tablet 0     Sig: TAKE 1 TABLET BY MOUTH TWICE DAILY WITH MEALS       Metformin Refill Protocol Failed - 6/11/2019  8:31 AM        Failed - Microalbumin in last year      Microalbumin, Random Urine   Date Value Ref Range Status   06/01/2018 0.99 0.00 - 1.99 mg/dL Final                  Passed - Blood pressure in last 12 months     BP Readings from Last 1 Encounters:   06/03/19 130/68             Passed - LFT or AST or ALT in last 12 months     Albumin   Date Value Ref Range Status   06/03/2019 3.6 3.5 - 5.0 g/dL Final     Bilirubin, Total   Date Value Ref Range Status   06/03/2019 0.4 0.0 - 1.0 mg/dL Final     Bilirubin, Direct   Date Value Ref Range Status   11/14/2015 0.2 <=0.5 mg/dL Final     Alkaline Phosphatase   Date Value Ref Range Status   06/03/2019 91 45 - 120 U/L Final     AST   Date Value Ref Range Status   06/03/2019 16 0 - 40 U/L Final     ALT   Date Value Ref Range Status   06/03/2019 18 0 - 45 U/L Final     Protein, Total   Date Value Ref Range Status   06/03/2019 6.9 6.0 - 8.0 g/dL Final                Passed - GFR or Serum Creatinine in last 6 months     GFR MDRD Non Af Amer   Date Value Ref Range Status   06/03/2019 49 (L) >60 mL/min/1.73m2 Final     GFR MDRD Af Amer   Date Value Ref Range Status   06/03/2019 60 (L) >60 mL/min/1.73m2 Final             Passed - Visit with PCP or prescribing provider visit in last 6 months or next 3 months     Last office visit with prescriber/PCP: 6/3/2019 OR same dept: 6/3/2019 Sincere Purvis MD OR same specialty: 6/3/2019 Sincere Purvis MD Last physical: Visit date not found Last MTM visit: Visit date not found         Next appt within 3 mo:  Visit date not found  Next physical within 3 mo: Visit date not found  Prescriber OR PCP: Sincere Purvis MD  Last diagnosis associated with med order: 1. Diabetes (H)  - metFORMIN (GLUCOPHAGE) 500 MG tablet [Pharmacy Med Name: METFORMIN 500MG TABLETS]; TAKE 1 TABLET BY MOUTH TWICE DAILY WITH MEALS  Dispense: 180 tablet; Refill: 0     If protocol passes may refill for 12 months if within 3 months of last provider visit (or a total of 15 months).           Passed - A1C in last 6 months     Hemoglobin A1c   Date Value Ref Range Status   06/03/2019 6.1 (H) 3.5 - 6.0 % Final
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
## 2021-05-29 NOTE — TELEPHONE ENCOUNTER
Refill Approved    Rx renewed per Medication Renewal Policy. Medication was last renewed on 11/5/2018.    Claudio Lee, [facility] Triage/Med Refill 5/26/2019     Requested Prescriptions   Pending Prescriptions Disp Refills     atorvastatin (LIPITOR) 40 MG tablet [Pharmacy Med Name: ATORVASTATIN 40MG TABLETS] 90 tablet 0     Sig: TAKE 1 TABLET(40 MG) BY MOUTH AT BEDTIME       Statins Refill Protocol (Hmg CoA Reductase Inhibitors) Passed - 5/26/2019  1:01 PM        Passed - PCP or prescribing provider visit in past 12 months      Last office visit with prescriber/PCP: 3/7/2019 Sincere Purvis MD OR same dept: 3/7/2019 Sincere Purvis MD OR same specialty: 3/7/2019 Sincere Purvis MD  Last physical: 6/24/2016 Last MTM visit: Visit date not found   Next visit within 3 mo: Visit date not found  Next physical within 3 mo: Visit date not found  Prescriber OR PCP: Sincere Purvis MD  Last diagnosis associated with med order: 1. Pure hypercholesterolemia  - atorvastatin (LIPITOR) 40 MG tablet [Pharmacy Med Name: ATORVASTATIN 40MG TABLETS]; TAKE 1 TABLET(40 MG) BY MOUTH AT BEDTIME  Dispense: 90 tablet; Refill: 0    If protocol passes may refill for 12 months if within 3 months of last provider visit (or a total of 15 months).

## 2021-05-30 VITALS — HEIGHT: 74 IN | BODY MASS INDEX: 32.34 KG/M2 | WEIGHT: 252 LBS
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
## 2021-05-30 NOTE — PROGRESS NOTES
Leg better.  Left.   Lacks full extension and will pwb on left and using standard walker.  Getting PT   Motivated    Diabetes denies polyuria.  Has new device easier to check sugars  Numbers good  Wonders about getting off insulin  Recent Cr less than 1.6    Metformin increased to 1000/d  On novolog sliding scale and tresiba    Hyperlipidemia on statin denies muscle pain     Renal insufficiency denies nausea or vomiting    OBJECTIVE:   Vitals:    07/10/19 1040   BP: 138/60   Pulse: (!) 104      Eyes: non icteric, noninflamed  Lungs: no resp distress  Heart: regular  Ankles: no edema  Muscles: nontender  Mental status: euthymic  Neuro: nonfocal    Body mass index is 33.28 kg/m .   pwb on left with walker  ASSESSMENT/PLAN:    Additional diagnoses and related orders:  1. Type 2 diabetes mellitus without complication, unspecified whether long term insulin use (H)     2. Pure hypercholesterolemia     3. Renal Insufficiency       Chronic issues stable/ same treatment.  Pt for leg  Increase activity  Increase metformin to 1500/d and slowly titrate tresiba down depending and fasting sugars.  Can increase to 2000/d metformin.   One month follow up  Chronic issues stable/ same treatment.

## 2021-05-31 VITALS — WEIGHT: 256 LBS | HEIGHT: 74 IN | BODY MASS INDEX: 32.85 KG/M2

## 2021-05-31 VITALS — BODY MASS INDEX: 32.67 KG/M2 | WEIGHT: 251 LBS

## 2021-05-31 VITALS — WEIGHT: 241.5 LBS | BODY MASS INDEX: 31.43 KG/M2

## 2021-05-31 VITALS — BODY MASS INDEX: 33.32 KG/M2 | WEIGHT: 256 LBS

## 2021-05-31 VITALS — WEIGHT: 250.4 LBS | BODY MASS INDEX: 32.59 KG/M2

## 2021-05-31 VITALS — BODY MASS INDEX: 33.19 KG/M2 | WEIGHT: 255 LBS

## 2021-05-31 NOTE — PATIENT INSTRUCTIONS - HE
Metformin:    for the next week, take 500 mg at breakfast and 1000 mg at dinner      Starting:  August 7th,  take 1000 mg at breakfast and 1000 mg at dinner       Insulin:    Continue 22 units Tresiba at bedtime  ____________________________________  Novolog:  take 8 units before breakfast, 8 units before lunch,  8 units before dinner    check BG before meals and add correction insulin:  Novolog  if pre meal BG   141 - 180   + 1 unit Novolog insulin  181-  220   + 2  221-  260   + 3  261-  300   + 4  301- 340    + 5

## 2021-05-31 NOTE — PROGRESS NOTES
Assessment: Diabetes education follow up with Goyo and wife Cate.   He is here for personal Freestyle andrew cgm download.   He reports taking tresiba 22 units at HS and novolog at meals, 6/6/6 - on his own will give extra Novolog insulin depending on what his pre meal BG is.  No particular pattern, he might add 1-3 units Novolog.   Pioglitazone 40 mg daily.   Glucose summary:  FBS  140-160 mg/dl,  post BG  244 mg/dl,  post lunch  190 mg/dl,  missing post dinner glucose as he is forgetting to scan Freestyle before going to bed.  Summary will be scanned into medical record.   Dr. Purvis has started him back on 500 mg metformin bid;  ok to titrate up according to last note.       Plan: as noted below, following protocol:  Follow up in 4 week for glucose download  and reveiw BG trends.  Goyo verbalized his understanding of insulin changes; written instruction also provided.     Continue pioglitazone 40 mg daily.    Metformin  Starting:  August 7th,  take 1000 mg at breakfast and 1000 mg at dinner     Insulin:    Continue 22 units Tresiba at bedtime  ____________________________________  Novolog:  take 8 units before breakfast, 8 units before lunch,  8 units before dinner    check BG before meals and add correction insulin:  Novolog  if pre meal BG   141 - 180   + 1 unit Novolog insulin  181-  220   + 2  221-  260   + 3  261-  300   + 4  301- 340    + 5    Reviewed signs/treatment for hypoglycemia.       Subjective and Objective:       Goyo Cole is referred by Dr. Purvis for Diabetes Education.                Lab Results   Component Value Date     HGBA1C 7.6 (H) 06/01/2018      Goals:   scan sensor every 8 hours    Follow up:  3-4 weeks     Education:      Monitoring   Meter (per above goals): assessment, discussed, pt returned demonstration,- Freestyle andrew cgm,  to use fingerstick as backup and check BG when in doublt  Monitoring: assessment, discussed, pt returned demonstration, literature provided  - Scan freestyle  premeal and at bedtime   BG goals: assessment, discussed, pt returned demonstration, literature provided       Diabetes Disease Process: Discussed, Literature provided and Patient returned demonstration     Acute Complications: Prevent, Detect, Treat:  Hypoglycemia: Discussed literature  Hyperglycemia: Discussed and Literature provided  Goal Setting and Problem Solving: Assessed and Discussed  Barriers: Discussed  Psychosocial Adjustments: Assessed and Discussed        Time spent with the patient: 30 minutes for diabetes education and counseling.   Previous Education: yes  Visit Type:DSMT

## 2021-06-01 VITALS — WEIGHT: 260 LBS | HEIGHT: 73 IN | BODY MASS INDEX: 34.46 KG/M2

## 2021-06-01 VITALS — BODY MASS INDEX: 33.9 KG/M2 | WEIGHT: 258.7 LBS

## 2021-06-01 NOTE — PROGRESS NOTES
Assessment: Diabetes education follow up with Goyo and wife Cate.   He is here for personal Freestyle andrew cgm download.   He reports taking tresiba 22 units at HS and novolog at meals, 8/8/8 and is following correction scale;  take 1 extra unit insulin for every 40 mg/dl above 140 mg/dl.   Pioglitazone 40 mg daily.  500 mg metformin at breakfast and 1000 mg metformin at dinner.   Glucose summary:  FBS  100   Post BF  178 mg/dl,  post lunch/dinner  130's mg/dl.   He is not reporting any symptoms of hypoglycemia.   Summary will be scanned into medical record.    He is somewhat more active as his leg is healing,  when his activity picks up may need to decrease insulin doses.       Plan: Doing very well.   Using correction scale appropriately.   Did recommend to go to 1000 mg metformin at BF and 1000 mg metformin at dinner.     If FBS are < 80 mg/dl consistently, then to decrease Tresiba to 20 units.   Goyo verbalized his understanding of insulin changes; written instruction also provided.   He may need additional decrease in insulin once starting on therapeutic dose of metformin.  To call in 1-2 weeks to report BG readings.   Call if any questions/concerns about hypoglycemia.   Sent to lab today for Alc check.      Continue pioglitazone 40 mg daily.    Metformin  Take 1000 mg at breakfast and 1000 mg at dinner      Insulin:    Continue 22 units Tresiba at bedtime  ____________________________________  Novolog:  take 8 units before breakfast, 8 units before lunch,  8 units before dinner     check BG before meals and add correction insulin:  Novolog  if pre meal BG   141 - 180   + 1 unit Novolog insulin  181-  220   + 2  221-  260   + 3  261-  300   + 4  301- 340    + 5     Reviewed signs/treatment for hypoglycemia.        Subjective and Objective:       Goyo Cole is referred by Dr. Purvis for Diabetes Education.      Goals:   scan sensor every 8 hours, take insulin as directed above    Follow up:  3-4  weeks     Education:      Monitoring   Meter (per above goals): assessment, discussed, pt returned demonstration,- Freestyle andrew cgm,  to use fingerstick as backup and check BG when in doublt  Monitoring: assessment, discussed, pt returned demonstration, literature provided  - Scan freestyle premeal and at bedtime   BG goals: assessment, discussed, pt returned demonstration, literature provided        Diabetes Disease Process: Discussed, Literature provided and Patient returned demonstration     Acute Complications: Prevent, Detect, Treat:  Hypoglycemia: Discussed literature  Hyperglycemia: Discussed and Literature provided  Goal Setting and Problem Solving: Assessed and Discussed  Barriers: Discussed  Psychosocial Adjustments: Assessed and Discussed        Time spent with the patient: 30 minutes for diabetes education and counseling.   Previous Education: yes  Visit Type:DSMT

## 2021-06-01 NOTE — TELEPHONE ENCOUNTER
RN cannot approve Refill Request    RN can NOT refill this medication Protocol failed and NO refill given        Nata Lewis, [facility] Triage/Med Refill 9/24/2019    Requested Prescriptions   Pending Prescriptions Disp Refills     metFORMIN (GLUCOPHAGE) 500 MG tablet [Pharmacy Med Name: METFORMIN 500MG TABLETS] 180 tablet 3     Sig: TAKE 1 TABLET BY MOUTH TWICE DAILY WITH MEALS       Metformin Refill Protocol Failed - 9/23/2019  6:32 PM        Failed - Microalbumin in last year      Microalbumin, Random Urine   Date Value Ref Range Status   06/01/2018 0.99 0.00 - 1.99 mg/dL Final                  Passed - Blood pressure in last 12 months     BP Readings from Last 1 Encounters:   07/10/19 138/60             Passed - LFT or AST or ALT in last 12 months     Albumin   Date Value Ref Range Status   06/03/2019 3.6 3.5 - 5.0 g/dL Final     Bilirubin, Total   Date Value Ref Range Status   06/03/2019 0.4 0.0 - 1.0 mg/dL Final     Bilirubin, Direct   Date Value Ref Range Status   11/14/2015 0.2 <=0.5 mg/dL Final     Alkaline Phosphatase   Date Value Ref Range Status   06/03/2019 91 45 - 120 U/L Final     AST   Date Value Ref Range Status   06/03/2019 16 0 - 40 U/L Final     ALT   Date Value Ref Range Status   06/03/2019 18 0 - 45 U/L Final     Protein, Total   Date Value Ref Range Status   06/03/2019 6.9 6.0 - 8.0 g/dL Final                Passed - GFR or Serum Creatinine in last 6 months     GFR MDRD Non Af Amer   Date Value Ref Range Status   06/03/2019 49 (L) >60 mL/min/1.73m2 Final     GFR MDRD Af Amer   Date Value Ref Range Status   06/03/2019 60 (L) >60 mL/min/1.73m2 Final             Passed - Visit with PCP or prescribing provider visit in last 6 months or next 3 months     Last office visit with prescriber/PCP: 7/10/2019 OR same dept: 7/10/2019 Sincere Purvis MD OR same specialty: 7/10/2019 Sincere Purvis MD Last physical: Visit date not found Last MTM visit: Visit date not found         Next appt within 3 mo:  Visit date not found  Next physical within 3 mo: Visit date not found  Prescriber OR PCP: Sincere Purvis MD  Last diagnosis associated with med order: 1. Diabetes (H)  - metFORMIN (GLUCOPHAGE) 500 MG tablet [Pharmacy Med Name: METFORMIN 500MG TABLETS]; TAKE 1 TABLET BY MOUTH TWICE DAILY WITH MEALS  Dispense: 180 tablet; Refill: 0     If protocol passes may refill for 12 months if within 3 months of last provider visit (or a total of 15 months).           Passed - A1C in last 6 months     Hemoglobin A1c   Date Value Ref Range Status   09/04/2019 7.7 (H) 3.5 - 6.0 % Final

## 2021-06-02 VITALS — WEIGHT: 266 LBS | BODY MASS INDEX: 34.86 KG/M2

## 2021-06-02 NOTE — TELEPHONE ENCOUNTER
RN cannot approve Refill Request    RN can NOT refill this medication Protocol failed and NO refill given.       Nata Lewis, [facility] Triage/Med Refill 10/21/2019    Requested Prescriptions   Pending Prescriptions Disp Refills     pioglitazone (ACTOS) 30 MG tablet [Pharmacy Med Name: PIOGLITAZONE 30MG TABLETS] 90 tablet 3     Sig: TAKE 1 TABLET(30 MG) BY MOUTH DAILY       Pioglitazone Protocol Failed - 10/21/2019  8:43 AM        Failed - Microalbumin in last year     Microalbumin, Random Urine   Date Value Ref Range Status   06/01/2018 0.99 0.00 - 1.99 mg/dL Final                  Passed - Blood pressure in last 12 months     BP Readings from Last 1 Encounters:   07/10/19 138/60             Passed - LFT or AST or ALT in last 12 months     Albumin   Date Value Ref Range Status   06/03/2019 3.6 3.5 - 5.0 g/dL Final     Bilirubin, Total   Date Value Ref Range Status   06/03/2019 0.4 0.0 - 1.0 mg/dL Final     Bilirubin, Direct   Date Value Ref Range Status   11/14/2015 0.2 <=0.5 mg/dL Final     Alkaline Phosphatase   Date Value Ref Range Status   06/03/2019 91 45 - 120 U/L Final     AST   Date Value Ref Range Status   06/03/2019 16 0 - 40 U/L Final     ALT   Date Value Ref Range Status   06/03/2019 18 0 - 45 U/L Final     Protein, Total   Date Value Ref Range Status   06/03/2019 6.9 6.0 - 8.0 g/dL Final                Passed - Visit with PCP or prescribing provider visit in last 6 months      Last office visit with prescriber/PCP: 7/10/2019 OR same dept: 7/10/2019 Sincere Purvis MD OR same specialty: 7/10/2019 Sincere Purvis MD Last physical: Visit date not found Last MTM visit: Visit date not found         Next appt within 3 mo: Visit date not found  Next physical within 3 mo: Visit date not found  Prescriber OR PCP: Sincere Purvis MD  Last diagnosis associated with med order: 1. Type 2 diabetes mellitus without complication, without long-term current use of insulin (H)  - pioglitazone (ACTOS) 30 MG tablet [Pharmacy  Med Name: PIOGLITAZONE 30MG TABLETS]; TAKE 1 TABLET(30 MG) BY MOUTH DAILY  Dispense: 90 tablet; Refill: 0     If protocol passes may refill for 12 months if within 3 months of last provider visit (or a total of 15 months).           Passed - A1C in last 6 months     Hemoglobin A1c   Date Value Ref Range Status   09/04/2019 7.7 (H) 3.5 - 6.0 % Final               Passed - Serum creatinine in last year     Creatinine   Date Value Ref Range Status   06/03/2019 1.41 (H) 0.70 - 1.30 mg/dL Final

## 2021-06-03 ENCOUNTER — RECORDS - HEALTHEAST (OUTPATIENT)
Dept: ADMINISTRATIVE | Facility: CLINIC | Age: 76
End: 2021-06-03

## 2021-06-03 ENCOUNTER — COMMUNICATION - HEALTHEAST (OUTPATIENT)
Dept: FAMILY MEDICINE | Facility: CLINIC | Age: 76
End: 2021-06-03

## 2021-06-03 VITALS — BODY MASS INDEX: 33.05 KG/M2 | WEIGHT: 252.2 LBS

## 2021-06-03 VITALS — WEIGHT: 254.5 LBS | BODY MASS INDEX: 33.35 KG/M2

## 2021-06-03 VITALS — BODY MASS INDEX: 33.03 KG/M2 | WEIGHT: 252.1 LBS

## 2021-06-03 VITALS — BODY MASS INDEX: 33.28 KG/M2 | WEIGHT: 254 LBS

## 2021-06-03 DIAGNOSIS — Z79.4 TYPE 2 DIABETES MELLITUS WITHOUT COMPLICATION, WITH LONG-TERM CURRENT USE OF INSULIN (H): ICD-10-CM

## 2021-06-03 DIAGNOSIS — E11.9 TYPE 2 DIABETES MELLITUS WITHOUT COMPLICATION, WITH LONG-TERM CURRENT USE OF INSULIN (H): ICD-10-CM
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
## 2021-06-03 NOTE — PATIENT INSTRUCTIONS - HE
Post Nail Excision Instructions      Keep bandages clean, dry, and intact for the rest of the day of surgery.     The day after surgery, remove all bandages    Soak foot in warm water with Epsom Salt for 10 minutes    Dry feet thoroughly     Apply a Band-Aid to the affected area    Continue this process daily for the next two weeks following the procedure    General bathing does not replace daily foot soaks    Do not anticipate severe pain. But if you do experience some discomfort, you can take Ibuprofen (Advil)    You can expect some drainage and weeping from the area. This may last anywhere from several days to several weeks. This is NORMAL.    If you experience any increase in pain, any swelling, or odor call our office immediately. As this may be a sign of infection.    If you have any questions in the meantime, please do not hesitate to call Podiatry at [phone]

## 2021-06-04 VITALS
HEART RATE: 81 BPM | SYSTOLIC BLOOD PRESSURE: 163 MMHG | DIASTOLIC BLOOD PRESSURE: 78 MMHG | BODY MASS INDEX: 34.76 KG/M2 | WEIGHT: 265.25 LBS

## 2021-06-04 VITALS
BODY MASS INDEX: 33.66 KG/M2 | DIASTOLIC BLOOD PRESSURE: 66 MMHG | SYSTOLIC BLOOD PRESSURE: 134 MMHG | WEIGHT: 254 LBS | HEART RATE: 61 BPM | HEIGHT: 73 IN | OXYGEN SATURATION: 95 %

## 2021-06-04 VITALS
WEIGHT: 254 LBS | HEIGHT: 73 IN | DIASTOLIC BLOOD PRESSURE: 70 MMHG | RESPIRATION RATE: 20 BRPM | BODY MASS INDEX: 33.66 KG/M2 | SYSTOLIC BLOOD PRESSURE: 146 MMHG | HEART RATE: 100 BPM | TEMPERATURE: 98.2 F

## 2021-06-04 VITALS
WEIGHT: 266 LBS | DIASTOLIC BLOOD PRESSURE: 62 MMHG | BODY MASS INDEX: 34.86 KG/M2 | SYSTOLIC BLOOD PRESSURE: 134 MMHG | HEART RATE: 96 BPM
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
## 2021-06-05 VITALS
HEART RATE: 98 BPM | BODY MASS INDEX: 32.86 KG/M2 | SYSTOLIC BLOOD PRESSURE: 147 MMHG | WEIGHT: 264.25 LBS | HEIGHT: 75 IN | TEMPERATURE: 98 F | DIASTOLIC BLOOD PRESSURE: 75 MMHG

## 2021-06-06 NOTE — TELEPHONE ENCOUNTER
Refill Approved    Rx renewed per Medication Renewal Policy. Medication was last renewed on 5/26/19.    Nata Lewis, [facility] Triage/Med Refill 2/26/2020     Requested Prescriptions   Pending Prescriptions Disp Refills     atorvastatin (LIPITOR) 40 MG tablet [Pharmacy Med Name: ATORVASTATIN 40MG TABLETS] 90 tablet 2     Sig: TAKE 1 TABLET(40 MG) BY MOUTH AT BEDTIME       Statins Refill Protocol (Hmg CoA Reductase Inhibitors) Passed - 2/26/2020  9:23 AM        Passed - PCP or prescribing provider visit in past 12 months      Last office visit with prescriber/PCP: 2/13/2020 Sincere Purvis MD OR same dept: 2/13/2020 Sincere Purvis MD OR same specialty: 2/13/2020 Sincere Purvis MD  Last physical: 6/24/2016 Last MTM visit: Visit date not found   Next visit within 3 mo: Visit date not found  Next physical within 3 mo: Visit date not found  Prescriber OR PCP: Sincere Purvis MD  Last diagnosis associated with med order: 1. Pure hypercholesterolemia  - atorvastatin (LIPITOR) 40 MG tablet [Pharmacy Med Name: ATORVASTATIN 40MG TABLETS]; TAKE 1 TABLET(40 MG) BY MOUTH AT BEDTIME  Dispense: 90 tablet; Refill: 2    If protocol passes may refill for 12 months if within 3 months of last provider visit (or a total of 15 months).

## 2021-06-06 NOTE — TELEPHONE ENCOUNTER
Medication Request    Medication name: flash glucose sensor (FREESTYLE ESTRADA 14 DAY SENSOR) Kit (Discontinued)    Requested Pharmacy: [facility] - [place], [place] - [address]     Reason for request:  Med request is not on patient current med list.  Please advise if refill is appropriate and notify the pharmacy.    When did you use medication last?:  Unknown    Patient offered appointment:  N/A - electronic request     Okay to leave a detailed message: yes

## 2021-06-06 NOTE — TELEPHONE ENCOUNTER
Spoke with them    The reader is good for few years and replacement not needed.    The sensor is needed.   The ins and system does not allow verbal order.      Our EHR cannot be used to order as it demands nonsensical fields to be filled out.    The pharmacy will discuss with the dme people.    They will send form which will have blanks for what is required and serve as a prescription for the needed supply which then only requires my signature.  Pharmacy understands we no longer have prescription blanks.    Fax number provided.     Please chart this note

## 2021-06-06 NOTE — TELEPHONE ENCOUNTER
I had to undo the orders because I couldn't get out of the screen otherwise     I can't figure out how to answer the stupid questions that don't make sense to sign the order    Dose  Frequency etc.

## 2021-06-06 NOTE — PROGRESS NOTES
follow up  Face to face follow up diabetes mellitus   Has free style meter    Checks breakfast, lunch and supper and hs and gives insulin four times.   novolog pre meal and tresiba hs.  Type 2 diabetes    Diabetes denies polyuria.  Hyperlipidemia on statin denies muscle pain  Renal insufficiency denies nausea or vomiting     Jan 2021 colonoscopy for two tubular adenomas      OBJECTIVE:   Vitals:    02/13/20 1054   BP: 134/62   Pulse: 96      Wt is noted.  No diaphoresis  Eyes: nl eom, anicteric   External ears, nose: nl    Neck: nl nodes, supple, thyroid normal   Lungs: clear to ausc   Heart: regular rhythm  Abd: soft nontender     No cva (renal) tenderness  Neuro: no weakness  Skin no rash  Joints: uninflamed   No ketotic breath odor noted  Mental: euthymic  Ext: nontender calves   Gait: wide based.  Rocks side to side.  Stable.  Has cane but not really needed.    Body mass index is 34.86 kg/m .     ASSESSMENT/PLAN:    1. Pure hypercholesterolemia  LDL Cholesterol, Direct   2. Type 2 diabetes mellitus without complication, with long-term current use of insulin (H)  Microalbumin, Random Urine    Glycosylated Hemoglobin A1c   3. Renal Insufficiency  Comprehensive Metabolic Panel     coronary artery disease risk management    Cancer screen.  Needs colonoscopy 2021    follow up six months or per labs    Chronic issues stable/ same treatment  Current Outpatient Medications on File Prior to Visit   Medication Sig Dispense Refill     acetaminophen (TYLENOL) 500 MG tablet Take 1,000 mg by mouth every 6 (six) hours.       amoxicillin (AMOXIL) 500 MG capsule 2000 mg by mouth as needed; take prior to dental appt  0     ASCORBIC ACID ORAL Take 1 tablet by mouth daily.       aspirin 81 MG EC tablet Take 81 mg by mouth daily.       atorvastatin (LIPITOR) 40 MG tablet TAKE 1 TABLET(40 MG) BY MOUTH AT BEDTIME 90 tablet 2     cholecalciferol, vitamin D3, (CHOLECALCIFEROL) 1,000 unit tablet Take 1,000 Units by mouth daily.        "CONTOUR NEXT TEST STRIPS strips CHECK BLOOD SUGAR BEFORE MEALS AT BEDTIME 100 strip 0     insulin aspart U-100 (NOVOLOG FLEXPEN U-100 INSULIN) 100 unit/mL (3 mL) injection pen Inject 7 units at breakfast, 8 at lunch and 10 at dinner 5 Pre-filled Pen Syringe prn     insulin degludec (TRESIBA FLEXTOUCH U-100) 100 unit/mL (3 mL) InPn Inject 24 Units under the skin at bedtime. 5 Syringe prn     metFORMIN (GLUCOPHAGE) 500 MG tablet Take 2 tablets (1,000 mg total) by mouth 2 (two) times a day with meals. 360 tablet 3     MICROLET LANCET USE TO CHECK BLOOD SUGAR QID  11     omeprazole (PRILOSEC) 20 MG capsule Take 20 mg by mouth daily.       pioglitazone (ACTOS) 30 MG tablet TAKE 1 TABLET(30 MG) BY MOUTH DAILY 90 tablet 3     aspirin 325 MG tablet Take 325 mg by mouth daily.       BD ULTRA-FINE EMMETT PEN NEEDLE 32 gauge x 5/32\" Ndle        calcium-vitamin D (CALCIUM-VITAMIN D) 500 mg(1,250mg) -200 unit per tablet Take 1 tablet by mouth 3 (three) times a day with meals.       docusate sodium (COLACE) 100 MG capsule Take 100 mg by mouth 2 (two) times a day.       nicotine polacrilex (NICORETTE) 4 MG gum Apply 4 mg to the mouth or throat every 4 (four) hours as needed for smoking cessation.       oxyCODONE (ROXICODONE) 5 MG immediate release tablet Take 5 mg by mouth every 4 (four) hours as needed for pain. And at bedtime             [DISCONTINUED] metFORMIN (GLUCOPHAGE-XR) 500 MG 24 hr tablet Take 500 mg by mouth daily with breakfast.       [DISCONTINUED] pioglitazone (ACTOS) 30 MG tablet Take 1 tablet (30 mg total) by mouth daily. 90 tablet 3     No current facility-administered medications on file prior to visit.                     "

## 2021-06-06 NOTE — TELEPHONE ENCOUNTER
Medication Request    Medication name:    1. Freestyle [place]  SIG: Use to read blood sugars as manufacturers instruction  QTY: 1 Refills 1    2. Freestyle Sensors  SIG: Change every 14 days.  QTY: 2 refills 2    Scripts must be written as shown above to be medicare compliant.    Requested Pharmacy: [facility] - [facility] [address]     Reason for request: please resend send scripts for medicare compliance.    When did you use medication last?:  Unknown    Patient offered appointment:  N/A - electronic request     Okay to leave a detailed message: yes

## 2021-06-06 NOTE — TELEPHONE ENCOUNTER
I pended this in case you approve request. Please review before signing. Patient was last seen on 02/13/2020.

## 2021-06-06 NOTE — TELEPHONE ENCOUNTER
Dr. Purvis, sorry, I tried calling it in but per pharmacist says that you have to call it in due to medicare guidelines. The number to call is [phone].

## 2021-06-07 NOTE — TELEPHONE ENCOUNTER
Set up order to be medicare compliant (sig must be in the additional directions section, not the comments to the pharmacy section).     Signed under Dr. Purvis as Medicare requires the order to be signed by the provider that evaluated the patient.     Does not appear to have an order for the actual reader, so sent that in as well.     Jake Ortega, PharmD  Medication Therapy Management (MTM) Pharmacist  [facility]

## 2021-06-07 NOTE — TELEPHONE ENCOUNTER
Jake and/or Cate, Can either of you look at this order and identify where the error possibly is? If you could set this up or order this for Dr. Dunn he would appreciate it. Thank you

## 2021-06-07 NOTE — TELEPHONE ENCOUNTER
Medication Question or Clarification  Who is calling: pharmacist  What medication are you calling about (include dose and sig)?:   glucose sensor (FREESTYLE ESTRADA 14 DAY SENSOR) Kit  1 each, Miscellaneous, DAILY         Summary: Use 1 each As Directed daily., Starting Wed 4/22/2020, Normal   Dose, Frequency: 1 each, DAILY  Start: 4/22/2020  Ord/Sold: 4/22/2020 (O)  Report  Adh:   Taking:   Long-term:   Pharmacy: [facility] - [facility] [address]   Med Dose History       Patient Sig: Use 1 each As Directed daily.       Ordered on: 4/22/2020       Authorized by: MARLENE REYES       Dispense: 2 kit       Refills: 12 ordered       Note to Pharmacy: to change sensor every 14 days.        Who prescribed the medication?: Sincere Purvis MD  What is your question/concern?: Order is not Medicare compliant.  Please send a new order with the sig complete not part of it in the notes.  Thank you.   Requested Pharmacy: [facility]   Okay to leave a detailed message?: No [phone]

## 2021-06-07 NOTE — TELEPHONE ENCOUNTER
Medication refill requested. Please authorize medication if appropriate.     Unsure of the direction, Please review before signing.

## 2021-06-07 NOTE — TELEPHONE ENCOUNTER
"Medication Request  Medication name:    Disp  Refills  Start  End     BD ULTRA-FINE EMMETT PEN NEEDLE 32 gauge x 5/32\" Ndle    12/26/2019      Class: Historical Med         Requested Pharmacy: [facility]  Reason for request: needing refills, only has couple days left.   When did you use medication last?:    Patient offered appointment:  patient declined  Okay to leave a detailed message: yes      "

## 2021-06-07 NOTE — TELEPHONE ENCOUNTER
Medication Request    Medication name: flash glucose sensor (FREESTYLE ESTRADA 14 DAY SENSOR) Kit     Requested Pharmacy: [facility] - [place], [place] - [address]      Reason for request:   Pharmacy states script was called in and for Medicare compliance pharmacy needs hard copy.    When did you use medication last?:  NA    Patient offered appointment:  N/A - electronic request     Okay to leave a detailed message: yes    Please resend script to patients pharmacy.

## 2021-06-07 NOTE — TELEPHONE ENCOUNTER
I am not sure what this means.  Please contact them directly and set up the prescription exactly how they want it done and I will sign it.  Maybe check with our diabetic instructor regarding this as well.  I think I have signed a prescription for this about 3-4 times now

## 2021-06-08 NOTE — PROGRESS NOTES
Patient was identified as a potential candidate for Clinic Care Coordination services and has declined participating.  I will discontinue outreach to the patient at this time. I have notified the patient s physician by message. If the provider feels this patient is a good fit in the future he can resend a referral to the [facility] [[email or web address]] Care Guide Pool.  I have also informed the patient I am mailing him the [facility] Brochure and my contact information should he change his mind in the future.

## 2021-06-09 NOTE — TELEPHONE ENCOUNTER
Dr. Purvis, Medication refill requested. Please authorize medication if appropriate. Thank you.

## 2021-06-10 NOTE — PATIENT INSTRUCTIONS - HE
Lab Results   Component Value Date    HGBA1C 6.7 (H) 08/03/2020     Your average blood sugars look great!  Keep same medicines.    We should see you back in clinic for follow-up in 6 months.    We want your blood pressure to be   less than 140/less than 90

## 2021-06-10 NOTE — PROGRESS NOTES
Met 850 bid  Glyburide 2 bid  actos 15/d  Asa 81  Losartan 50 /d   Not all the time.    If taken at night, feels light headed in the morning.    Makes me urinate and stool.   Stools frequently during the day.    multivit    prilosec    Got eye exam.  Says no diabetes mellitus in eyes.      Feels good other than cold.    Renal insufficiency denies nausea or vomiting  Obesity wt is down some.  Is watching diet  Last ldl less than 100.    Due for colonoscopy.   Will schedule on own.    ROS: as noted above    OBJECTIVE:   Vitals:    05/10/17 1449   BP: 136/72   Pulse: 76   Resp: 20      Head: atraumatic   Eyes: nl eom, anicteric   Ears: nl external ears   Neck: nl nodes, supple   Lungs: clear to ausc   Heart: regular rhythm  Back: no tenderness  Abd: soft nontender   Joints: uninflamed   Ext: nontender calves   Mental: euthymic  Neuro: no weakness  Gait: mild slow stiff walk  ASSESSMENT/PLAN:    1. Type 2 diabetes mellitus without complication, without long-term current use of insulin  Microalbumin, Random Urine    Glycosylated Hemoglobin A1c    LDL Cholesterol, Direct   2. Obesity     3. Essential hypertension  Comprehensive Metabolic Panel   4. Proteinuria  Microalbumin, Random Urine   5. Renal Insufficiency  Comprehensive Metabolic Panel   6. ELIOT on CPAP       Chronic issues stable/ same treatment.  A1c returns high.  Will sched with educator to start insulin
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
## 2021-06-10 NOTE — PROGRESS NOTES
Assessment: Met with Goyo and wife Cate today for insulin start, A1c noted 11.4.      Current medications:  Actos 15 mg daily  Glyburide two 5 mg tablets bid  Metformin 850 mg bid    Goyo does not check bg and is not excited about having to start insulin, believes wife Cate is going to check bg and give insulin,however Cate is having knee replacement next week so is not able to assist which she has been up front about with Goyo. Instructed Goyo he needs to do this cannot depend on Cate.  Instructed on rationale for starting insulin as well as the need for smbg to assess medication doses and adjustments. Reports eating candy bars, ice cream and cookies and has now decided to stop all this and believes this will take care of bg. Instructed on A1c and how this in addition to smbg helps determine medication need and dose.  Eating sweets did not drive up A1c, has been going up consistently since last year.     Cate has good basic understanding of cho foods, healthy fats, portion control and meal consistency, has been on medi fast for the last two years.  Tries to teach Goyo but he does not always want to listen.  Brief review on cho foods, affect on bg and need for portion control as well as heart healthy fats. Cate cooks dinner each night however Goyo makes his own breakfast and lunches which are generally microwave meals, [employer] breakfast bowls and [employer] meals. Encouraged other food choices at meals given kcal, fat and cho content, Goyo does not do much in the way of cooking so having to make something other than a frozen meal is not his idea of cooking. Discussed menu options and encouraged small changes.  Very nice man who understands but is a bit hesitant to make changes, but has not ruled out any change.    Cate has ordered [employer] meals while she is recovering, breakfast, lunch and dinner, this will aid in meal/food choices as well as portion control.  Goyo asked about ETOH intake,  his beverage of choice is Leon Kenny, does not drink often but will drink large shot when he does drink. Instructed on ETOH guidelines for diabetes, moderation and portion control, encouraged smaller shot vs one large shot, will consider this.    No meter, provided [employer] EZ meter today, instructed on use and when to check, returned claire but did not amarilis finger, this writer lanced finger, he was pleasantly surprised by lack of pain, goal is to check once daily and rotate time of check.  Bg at visit 207 mg/dl 2.5 PC, written guidelines provided.     Instructed on insulin administration, priming of pen, storage, expiration and needle disposal. Demo 'd back without difficulty, goal is to give actual injection tonight with the assistance of wife Cate.  To start 10 units at HS, call with update in 7-10 days, follow up 6.2017.    Very nice couple, Goyo is not a fan of needles but understands need and rationale for smbg and insulin start.     Medications noted as well as renal function, spoke with Dr. Purvis gave ok:  1. tresiba 10 units at hs  2. Stop glyburide  3. Change metformin dose to 500 mg bid from 850 mg bid  4.  Increase Actos to 30 mg daily  5. Follow up BMP next visit  6. Call with updated bg and insulin adjustment  7. Follow up in clinic with CDE 6.2017    Plan:  As above    Subjective and Objective:      Goyo Cole is referred by   for Diabetes Education.     Lab Results   Component Value Date    HGBA1C 11.4 (H) 05/10/2017         Current diabetes medications:    Tresiba 10 units at hs  Metformin 500 mg bid  Actos 30 mg daily    Goals       medication            1. Start 10 units of Tresiba tonight at HS  2. Stop glipizide  3. Increase actos to two 15 mg pills  4. Change metformin to 500 mg tablets bid            Follow up:   CDE (certified diabetic educator)      Education:     Monitoring   Meter (per above goals): Assessed, Discussed, Literature provided and Patient returned  demonstration  Monitoring: Assessed, Discussed and Literature provided  BG goals: Assessed, Discussed and Literature provided    Nutrition Management  Nutrition Management: Assessed and Discussed  Weight: Assessed  Portions/Balance: Discussed  Carb ID/Count: Discussed  Heart Healthy Fats: Discussed  Menu Planning: Discussed and Needs instruction/review at follow-up  Dining Out: Not addressed  Medications: Discussed  Orals: Discussed  Injected Medications: Discussed, Literature provided and Patient returned demonstration   Storage/Exp:Discussed and Literature provided   Site Rotation: Discussed and Literature provided   Sites Assessed: no      Acute Complications: Prevent, Detect, Treat:  Hypoglycemia: Assessed and Discussed  Hyperglycemia: Assessed and Discussed    Chronic Complications  ABC: Assessed and Discussed  Goal Setting and Problem Solving: Discussed  Barriers: Assessed  Psychosocial Adjustments: Assessed      Time spent with the patient: 60 minutes for diabetes education and counseling.   Previous Education: no  Visit Type:DSMT  Hours Remaining: DSMT 9 and MNT 3      Leisa Sainz  5/19/2017

## 2021-06-10 NOTE — TELEPHONE ENCOUNTER
Refill Approved    Rx renewed per Medication Renewal Policy. Medication was last renewed on 6/3/2019.    Alivia Newell, [facility] Triage/Med Refill 7/26/2020     Requested Prescriptions   Pending Prescriptions Disp Refills     insulin aspart U-100 (NOVOLOG FLEXPEN U-100 INSULIN) 100 unit/mL (3 mL) injection pen [Pharmacy Med Name: NOVOLOG 100 UNIT/ML FLEXPEN] 15 mL 0     Sig: INJECT 7 UNITS AT BREAKFAST, 8 UNITS AT LUNCH, AND 10 UNITS AT DINNER       Insulin/GLP-1 Refill Protocol Passed - 7/25/2020 10:33 AM        Passed - Visit with PCP or prescribing provider visit in last 6 months     Last office visit with prescriber/PCP: 2/13/2020 OR same dept: 2/13/2020 Sincere Purvis MD OR same specialty: 2/13/2020 Sincere Purvis MD Last physical: Visit date not found Last MTM visit: Visit date not found     Next appt within 3 mo: Visit date not found  Next physical within 3 mo: Visit date not found  Prescriber OR PCP: Sincere Purvis MD  Last diagnosis associated with med order: 1. Diabetes mellitus due to underlying condition with hyperglycemia (H)  - NOVOLOG FLEXPEN U-100 INSULIN 100 unit/mL (3 mL) injection pen [Pharmacy Med Name: NOVOLOG 100 UNIT/ML FLEXPEN]; INJECT 7 UNITS AT BREAKFAST, 8 UNITS AT LUNCH, AND 10 UNITS AT DINNER  Dispense: 15 mL; Refill: 0    If protocol passes may refill for 6 months if within 3 months of last provider visit (or a total of 9 months).              Passed - A1C in last 6 months     Hemoglobin A1c   Date Value Ref Range Status   02/13/2020 6.4 (H) 3.5 - 6.0 % Final               Passed - Microalbumin in last year     Microalbumin, Random Urine   Date Value Ref Range Status   02/13/2020 4.30 (H) 0.00 - 1.99 mg/dL Final                  Passed - Blood pressure in last year     BP Readings from Last 1 Encounters:   02/13/20 134/62             Passed - Creatinine done in last year     Creatinine   Date Value Ref Range Status   02/13/2020 1.50 (H) 0.70 - 1.30 mg/dL Final

## 2021-06-11 NOTE — PROGRESS NOTES
Assessment:  Follow up with Goyo and wife Cate today to assess bg and possible meal time insulin start. Have been adjusting Tresiba with Goyo,current increase to 18 units, bg both fasting and before meals remain elevated. Goyo has lost ( # since the start of insulin, believe some of this is due to eating [employer] meals while Cate was recovering from total knee replacement.  Cate is now back to making meals, however she is very aware of cho foods, healthy protein, whole grains and the importance of fruit and vege. Neither Cate or Goyo consume large amounts of cho, Cate finds limiting cho servings and eating only whole grains works well for her and Goyo eats what Cate makes, states he is satisfied with whatever she makes.     BG:  Fasting and before meals 180-250 mg/dl    Spoke with  in agreement to start meal time insulin.      Plan:  Increase Tresiba to 20 units  Start Novolog 4 units at meals.    Call in 7-10 days with bg update.     Plan: as above, follow up in clinic 8.2017    Subjective and Objective:      Goyo Cole is referred by  for Diabetes Education.     Lab Results   Component Value Date    HGBA1C 11.4 (H) 05/10/2017         Current diabetes medications:    Metformin 500 mg bid  Actos 30 mg daily  tresiba 20 units  Novolog 4 units at meals.    Goals       medication            1. Start 10 units of Tresiba tonight at HS  MET  2. Stop glipizide MET  3. Increase actos to two 15 mg pills MET  4. Change metformin to 500 mg tablets bid MET        monitoring            1. Check bg once daily rotate time of check. MET            Follow up:   CDE (certified diabetic educator)      Education:     Monitoring   Meter (per above goals): Competent  Monitoring: Discussed and Competent  BG goals: Assessed and Discussed    Nutrition Management  Nutrition Management: Discussed  Weight: Assessed and Discussed  Portions/Balance: Discussed and Competent  Carb ID/Count: Competent  Heart Healthy Fats:  Not addressed  Menu Planning: Discussed  Physical Activity: Discussed  Medications: Discussed and Competent  Orals: Competent  Injected Medications: Assessed and Discussed   Storage/Exp:Competent   Site Rotation: Discussed   Sites Assessed: yes    Acute Complications: Prevent, Detect, Treat:  Hypoglycemia: Discussed  Hyperglycemia: Discussed    Chronic Complications  ABC: Assessed and Discussed  Goal Setting and Problem Solving: Discussed and Competent  Barriers: Assessed  Psychosocial Adjustments: Assessed      Time spent with the patient: 60 minutes for diabetes education and counseling.   Previous Education: yes  Visit Type:DSMT  Hours Remaining: DSMT 8 and MNT 3      Leisa Sainz  6/14/2017

## 2021-06-12 NOTE — TELEPHONE ENCOUNTER
RN cannot approve Refill Request    RN can NOT refill this medication med is not covered by policy/route to provider. Last office visit: 8/3/2020 Magalis Tucker PA-C Last Physical: Visit date not found Last MTM visit: Visit date not found Last visit same specialty: 8/3/2020 Magalis Tucker PA-C.  Next visit within 3 mo: Visit date not found  Next physical within 3 mo: Visit date not found      Nidhi Moreno, [facility] Triage/Med Refill 10/17/2020    Requested Prescriptions   Pending Prescriptions Disp Refills     pioglitazone (ACTOS) 30 MG tablet 90 tablet 3       Pioglitazone Protocol Passed - 10/15/2020  1:24 PM        Passed - Blood pressure in last 12 months     BP Readings from Last 1 Encounters:   08/03/20 163/78             Passed - LFT or AST or ALT in last 12 months     Albumin   Date Value Ref Range Status   02/13/2020 4.0 3.5 - 5.0 g/dL Final     Bilirubin, Total   Date Value Ref Range Status   02/13/2020 0.6 0.0 - 1.0 mg/dL Final     Bilirubin, Direct   Date Value Ref Range Status   11/14/2015 0.2 <=0.5 mg/dL Final     Alkaline Phosphatase   Date Value Ref Range Status   02/13/2020 63 45 - 120 U/L Final     AST   Date Value Ref Range Status   02/13/2020 22 0 - 40 U/L Final     ALT   Date Value Ref Range Status   02/13/2020 30 0 - 45 U/L Final     Protein, Total   Date Value Ref Range Status   02/13/2020 7.2 6.0 - 8.0 g/dL Final                Passed - Visit with PCP or prescribing provider visit in last 6 months      Last office visit with prescriber/PCP: 8/3/2020 OR same dept: 8/3/2020 Magalis Tucker PA-C OR same specialty: 8/3/2020 Magalis Tucker PA-C Last physical: Visit date not found Last MTM visit: Visit date not found         Next appt within 3 mo: Visit date not found  Next physical within 3 mo: Visit date not found  Prescriber OR PCP: Magalis Tucker PA-C  Last diagnosis associated with med order: 1. Type 2 diabetes mellitus without complication,  without long-term current use of insulin (H)  - pioglitazone (ACTOS) 30 MG tablet  Dispense: 90 tablet; Refill: 3     If protocol passes may refill for 12 months if within 3 months of last provider visit (or a total of 15 months).           Passed - A1C in last 6 months     Hemoglobin A1c   Date Value Ref Range Status   08/03/2020 6.7 (H) 3.5 - 6.0 % Final               Passed - Microalbumin in last year     Microalbumin, Random Urine   Date Value Ref Range Status   02/13/2020 4.30 (H) 0.00 - 1.99 mg/dL Final                  Passed - Serum creatinine in last year     Creatinine   Date Value Ref Range Status   08/03/2020 1.44 (H) 0.70 - 1.30 mg/dL Final

## 2021-06-12 NOTE — PROGRESS NOTES
Assessment:  Met with Goyo and Cate today to assess bg and adjust insulin.      Current dm medications:  Tresiba 20 units at HS  Novolog 4 units at meals  Metformin 500 mg bid  Actos 30 mg daily    Bg:  Fasting range 150-200 mg/dl  Lunch 130-170 mg/dl  With a few 200's  Dinner 150-200 mg/dl    Goyo admits to not always taking meal time insulin, has been good about taking tresiba, has missed 9 dinner doses of Novolog over the last few weeks.  Checks fasting bg daily, lunch and dinner are not consistent, encouraged, instructed to check before meals and HS a few nights per week.  Reports eating during the noc when he cannot sleep, does not do this often but did last night, ate 4 slices of melon, in addition did not take dinner novolog and ate peanut buster parfait as well, fating bg this  mg/dl, would expect this to be much higher.  Instructed on need for consistent insulin administration to gain better bg control, missing doses and eating sweets or extra's has great affect on bg.     Intake varies, eats three meals per day which are prepared by wife Cate, cho and portion controlled with lean protein and increased vege, however Goyo likes to snack and dose not necessarily pay attention to portion, may be fruit, cookies, ice cream etc. Instructed affect of these foods on bg and need for moderation and portion control.  Cate has good understanding of cho's and label reading for cho, protein and fat, however does not have control over what Goyo eats for snacks or cocktails.  Drinks regular soda with drinks, will not consider diet or at least 50/50 diet and regular.     Inquired about lower bg in the 130-140's, states this is generally due to yard work or some kind of exercise, instructed how exercise affects bg.  Last A1c noted in 5.2017, 11.4, needs recheck of BMP, will call Goyo for lab only appt.   Give above bg, increased meal time insulin to 6 units with the exception of 4 units at breakfast when eats  eggs/omelettes as bg are much lower when he eats only protein for breakfast and when he is active in the morning. Will continue with 20 units of Tresiba at HS.  CDE will contact Goyo 7-10 days to obtain updated bg and adjust insulin.     Plan:   As above, follow up with CDE 10.2017    Subjective and Objective:      Goyo MICHAEL Quinteroy is referred by  for Diabetes Education.     Lab Results   Component Value Date    HGBA1C 11.4 (H) 05/10/2017         Current diabetes medications:    Tresiba 20 units at HS  Novolog 6 units at meals  Metformin 500 mg bid  Actos 30 mg daily        Goals       medication            1. Start 10 units of Tresiba tonight at HS  MET  2. Stop glipizide MET  3. Increase actos to two 15 mg pills MET  4. Change metformin to 500 mg tablets bid MET        monitoring            1. Check bg once daily rotate time of check. MET            Follow up:   CDE (certified diabetic educator)      Education:     Monitoring   Meter (per above goals): Competent  Monitoring: Discussed  BG goals: Assessed and Discussed    Nutrition Management  Nutrition Management: Assessed and Discussed  Weight: Assessed and Discussed  Portions/Balance: Assessed and Discussed  Carb ID/Count: Assessed and Discussed  Label Reading: Discussed  Heart Healthy Fats: Competent  Menu Planning: Assessed and Discussed  Dining Out: Not addressed  Physical Activity: Assessed and Discussed  Medications: Discussed  Orals: Competent  Injected Medications: Competent   Storage/Exp:Competent   Site Rotation: Competent     Diabetes Disease Process: Discussed    Acute Complications: Prevent, Detect, Treat:  Hypoglycemia: Assessed and Discussed  Hyperglycemia: Assessed and Discussed    Chronic Complications  Foot Care:Needs instruction/review at follow-up  Skin Care: Needs instruction/review at follow-up  Eye: Needs instruction/review at follow-up  ABC: Assessed  Teeth:Needs instruction/review at follow-up  Goal Setting and Problem Solving:  Assessed  Barriers: Assessed  Psychosocial Adjustments: Assessed      Time spent with the patient: 60 minutes for diabetes education and counseling.   Previous Education: yes  Visit Type:MNT  Hours Remaining: DSMT 8 and MNT 2      Leisa Sainz  8/28/2017

## 2021-06-13 NOTE — PROGRESS NOTES
Left fore arm tingle numb now 26 hours.   Is either less or getting used to it.    Left side of mouth numbness three hours earlier.  Chews snuff on the right side    Neg ct.   Started on asa in ED.   Advised urgent mri    8.8 last A1c  1.52 last Cr  120 ldl    Recent microalb just barely over 1.9    Saw eye recently    Ct incidental frontal calcification  Hyperlipidemia on statin denies muscle pain  Diabetes denies polyuria.    ROS: as noted above    OBJECTIVE:   Vitals:    10/25/17 1402   BP: 138/58   Pulse: 81   Resp: 20   SpO2: 97%      Wt is noted.  No diaphoresis  Eyes: nl eom, anicteric   External ears, nose: nl    Neck: nl nodes, supple, thyroid normal   Lungs: clear to ausc   Heart: regular rhythm  Abd: soft nontender   No cva (renal) tenderness  Neuro: no weakness  Skin no rash  Joints: uninflamed   No ketotic breath odor noted  Mental: euthymic  Ext: nontender calves   Gait: normal    Bmi:33   Slowly rising    ASSESSMENT/PLAN:    1. Hypesthesia  Ambulatory referral to Neurology    Lyme Antibody Cascade    MR Brain With Without Contrast   2. Pure hypercholesterolemia     3. Type 2 diabetes mellitus without complication     4. Colon cancer screening  Ambulatory referral for Colonoscopy     Chronic issues stable/ same treatment.   Life style modification

## 2021-06-13 NOTE — PROGRESS NOTES
Assessment: Follow up with Goyo and Cate today, bring in log book, bg noted below.  Much better about taking insulin at meals and hs, applauded insulin adherence.    Current dm medications:  Tresiba U 100 20 units at HS  Novolog 6/6/6  Actos 15 mg  Metformin 500 mg bid    Bg:  Fasting  Lunch   Dinner   HS  256         192        214         143       204  217         150  191         173       182  144  230         159       169  209         155       206  203                      239  158         116  171         120       198    As stated above Goyo is much better about taking meal time insulin but still missed as noted above, if there is no bg check no insulin was given. Fasting's remain elevated but believe this is due to higher bg before dinner and some HS snacks.  Some nights snack is fruit and yogurt and others could be cookies and ice cream. Instructed he it is ok to have cookies or ice cream but not both given affect on bg, need for portion control of HS snack given fasting bg.  When Goyo is at home eats very sensible meals provided by wife Cate who counts cho's and reads label for fat and cho content, but if Goyo is out and without Cate does not make the best choices and tends not to take his insulin.     Increased lunch and dinner meal time to 8 units and will continue with 6 at breakfast and 20 of tresiba at HS.  Cate will e-mail in updated bg in 2 weeks for insulin adjustment.  Follow up appt 12.2017 with CDE.    Plan:  As above    Subjective and Objective:      Goyo Cole is referred by  for Diabetes Education.     Lab Results   Component Value Date    HGBA1C 8.8 (H) 08/31/2017         Current diabetes medications:    Tresiba U 100 20 units at HS  Novolog 6/8/8  Actos 15 mg  Metformin 500 mg bid        Goals       medication            1. Start 10 units of Tresiba tonight at HS  MET  2. Stop glipizide MET  3. Increase actos to two 15 mg pills MET  4. Change metformin to 500 mg tablets bid  MET        monitoring            1. Check bg once daily rotate time of check. MET            Follow up:   CDE (certified diabetic educator)      Education:     Monitoring   Meter (per above goals): Competent  Monitoring: Assessed  BG goals: Assessed and Discussed    Nutrition Management  Nutrition Management: Assessed and Discussed  Weight: Assessed  Portions/Balance: Discussed  Carb ID/Count: Discussed  Heart Healthy Fats: Discussed  Menu Planning: Assessed and Discussed  Dining Out: Discussed  Physical Activity: Discussed  Medications: Discussed  Orals: Discussed  Injected Medications: Discussed   Storage/Exp:Competent   Site Rotation: Competent   Sites Assessed: yes    Acute Complications: Prevent, Detect, Treat:  Hypoglycemia: Assessed and Discussed  Hyperglycemia: Assessed and Literature provided    Chronic Complications  ABC: Assessed and Discussed  Goal Setting and Problem Solving: Assessed  Barriers: Assessed  Psychosocial Adjustments: Assessed      Time spent with the patient: 60 minutes for diabetes education and counseling.   Previous Education: yes  Visit Type:DSMT  Hours Remaining: DSMT 7, MNT 2    Leisa Sainz  10/18/2017

## 2021-06-14 NOTE — TELEPHONE ENCOUNTER
Medication Request  Medication name: freestyle andrew  Requested Pharmacy: [facility]  Reason for request: Refill  When did you use medication last?:  NA  Patient offered appointment:  N/A - electronic request  Okay to leave a detailed message: no

## 2021-06-14 NOTE — PROGRESS NOTES
"  Subjective:    Goyo Cole is a 75 y.o. male who presents with chief complaint of a few concerns:  1.  Diabetes check.  He feels like he is doing well with his diabetes.  He has the freestyle andrew meter.  He feels like the meter is not very accurate.  Sometimes if he wants to double check his sugars he will focus finger use normal test strips.  He is taking Metformin and Actos as directed.  Insulin 3 times a day with food and then 22 units at night of Tresiba.  He says if his sugars are 60 or below he only gives himself a few units.  He does this before eating.  I discussed with him we should closely monitor any sugars below 60, and not necessarily treat this with insulin.  Sugar reading right now in the clinic with his freestyle andrew meter = 109    2.  Testicular mass.  He has noticed a bump on his left testicle for 8 to 9 months.  He feels like it staying the same size.  Occasionally it is painful or \"pops out.\"    3.  History of groin pull.  Right groin pull several months ago.  He is going to physical therapy.  They did an x-ray of his hip which was reportedly normal.  He has noticed with PT exercises that sometimes his left posterior thigh gets stiff.  He walks with a cane today.    Patient Active Problem List   Diagnosis     Fatigue     Type 2 diabetes mellitus without complication (H)     Obesity     Pure hypercholesterolemia     Essential Hypertension     Renal Insufficiency     Proteinuria     Male Erectile Disorder     Esophageal Reflux     Degenerative joint disease (DJD) of hip     ELIOT on CPAP     Mass of left testicle       Current Outpatient Medications:      aspirin 81 MG EC tablet, Take 81 mg by mouth daily., Disp: , Rfl:      atorvastatin (LIPITOR) 40 MG tablet, TAKE 1 TABLET(40 MG) BY MOUTH AT BEDTIME, Disp: 90 tablet, Rfl: 3     BD ULTRA-FINE EMMETT PEN NEEDLE 32 gauge x 5/32\" Ndle, USE AS DIRECTED, Disp: 200 each, Rfl: 3     cholecalciferol, vitamin D3, (CHOLECALCIFEROL) 1,000 unit tablet, " "Take 1,000 Units by mouth daily., Disp: , Rfl:      CONTOUR NEXT TEST STRIPS strips, CHECK BLOOD SUGAR BEFORE MEALS AND AT BEDTIME, Disp: 120 strip, Rfl: 10     flash glucose scanning reader (FREESTYLE ESTRADA 14 DAY READER) Misc, Use 1 application As Directed every 8 (eight) hours. Test every 8 hours Per  instructions., Disp: 1 each, Rfl: 0     flash glucose sensor (FREESTYLE ESTRADA 14 DAY SENSOR) Kit, Use 1 each As Directed every 14 (fourteen) days. Per  instructions., Disp: 2 kit, Rfl: 12     insulin aspart U-100 (NOVOLOG FLEXPEN U-100 INSULIN) 100 unit/mL (3 mL) injection pen, INJECT 7 UNITS AT BREAKFAST, 8 UNITS AT LUNCH, AND 10 UNITS AT DINNER, Disp: 30 mL, Rfl: 1     metFORMIN (GLUCOPHAGE) 500 MG tablet, TAKE 2 TABLETS(1000 MG) BY MOUTH TWICE DAILY WITH MEALS, Disp: 360 tablet, Rfl: 3     MICROLET LANCET, USE TO CHECK BLOOD SUGAR QID, Disp: , Rfl: 11     miscellaneous medical supply (BLOOD PRESSURE CUFF) [facility], Please provide patient with automatic blood pressure cuff.  Diagnosis: hypertension, Disp: 1 each, Rfl: 0     pioglitazone (ACTOS) 30 MG tablet, Take 1 tablet (30 mg total) by mouth daily., Disp: 90 tablet, Rfl: 3     TRESIBA FLEXTOUCH U-100 100 unit/mL (3 mL) InPn, INJECT 24 UNITS UNDER THE SKIN AT BEDTIME, Disp: 15 mL, Rfl: 2     acetaminophen (TYLENOL) 500 MG tablet, Take 1,000 mg by mouth every 6 (six) hours., Disp: , Rfl:      ASCORBIC ACID ORAL, Take 1 tablet by mouth daily., Disp: , Rfl:      docusate sodium (COLACE) 100 MG capsule, Take 100 mg by mouth 2 (two) times a day., Disp: , Rfl:      Objective:   Allergies:  Patient has no known allergies.    Vitals:  Vitals:    01/21/21 1100 01/21/21 1201   BP: 147/70 147/75   Patient Site: Left Arm Left Arm   Patient Position: Sitting Sitting   Cuff Size: Adult Large Adult Large   Pulse: 96 98   Temp: 98  F (36.7  C)    TempSrc: Other    Weight: (!) 264 lb 4 oz (119.9 kg)    Height: 6' 2.8\" (1.9 m)      Body mass index is 33.2 " kg/m .    Vital signs reviewed.  General: Patient is alert and oriented x 3, in no apparent distress  Cardiac: regular rate and rhythm, no murmurs  Pulmonary: lungs clear to auscultation bilaterally, no crackles, rales, rhonchi, or wheezing noted  Extremities: Minimal nonpitting edema present in bilateral lower extremities  : Penis is healthy and normal, no lesions, vesicles or concerns,  Right testicle is grossly normal, left testicle does have a small mass approximately 1 cm that appears to be not attached to the scrotum, no pain with palpation of this mass  Mass is flat, no swelling or other abnormalities noted in the scrotum  Diabetic Foot Exam:  Normal Exam.  Normal pedal pulses bilaterally.  Skin appears healthy and without significant injury.  Sensation is intact to monofilament bilaterally.      Results for orders placed or performed in visit on 01/21/21   Glycosylated Hemoglobin A1c   Result Value Ref Range    Hemoglobin A1c 6.6 (H) <=5.6 %       Assessment and Plan:   1. Type 2 diabetes mellitus without complication, with long-term current use of insulin (H)  A1c: 6.6% well-controlled, stable  Eye Exam: UTD  Foot Exam: done today, normal  Smoking: no but uses chewing tobacco, advised to quit  On a statin: yes  Blood Pressure: generally well-controlled, minimally elevated  Microalbumin: done today  On ACE inhibitor: no will follow-up with Creatinine today  We discussed the importance of not giving insulin when your sugars are too low.  If he gets a sensor reading that his sugars are in the 60s or lower, I would like him to either not give himself any insulin, or do a fingerstick poke to double check this.  I discussed the risks of very low blood sugars with patient today.  Continue present medications.  - Glycosylated Hemoglobin A1c  - Basic Metabolic Panel  - Microalbumin, Random Urine    2. Essential hypertension  We will contact patient to discuss starting a blood pressure medicine.  Possibly could be  ACE inhibitor, but that will depend on kidney tests.  I will follow-up with results.  - Basic Metabolic Panel  - Microalbumin, Random Urine    3. Renal Insufficiency  We will wait for labs to return before making any medication choices.  - Microalbumin, Random Urine    4. Proteinuria, unspecified type  Known proteinuria.  I will follow-up with results.  - Microalbumin, Random Urine    5. Mass of left testicle  Differential includes benign mass, cyst, varicocele, versus other.  Referral to urology for further follow-up.  - Ambulatory referral to Urology    He declines flu shot.  40 minutes spent on the date of the encounter doing chart review, history and exam, and documentation.      This dictation uses voice recognition software, which may contain typographical errors.

## 2021-06-15 NOTE — TELEPHONE ENCOUNTER
Refill Approved    Rx renewed per Medication Renewal Policy. Medication was last renewed on historical med..    Alivia Newell, [facility] Triage/Med Refill 2/5/2021     Requested Prescriptions   Pending Prescriptions Disp Refills     flash glucose sensor (FREESTYLE ESTRADA 14 DAY SENSOR) Kit  0     Sig: Use As Directed.       Diabetic Supplies Refill Protocol Passed - 2/5/2021 11:01 AM        Passed - Visit with PCP or prescribing provider visit in last 6 months     Last office visit with prescriber/PCP: 1/21/2021 Magalis Tucker PA-C OR same dept: 1/21/2021 Magalis Tucker PA-C OR same specialty: 1/21/2021 Magalis Tucker PA-C  Last physical: Visit date not found Last MTM visit: Visit date not found   Next visit within 3 mo: Visit date not found  Next physical within 3 mo: Visit date not found  Prescriber OR PCP: Magalis Tucker PA-C  Last diagnosis associated with med order: There are no diagnoses linked to this encounter.  If protocol passes may refill for 12 months if within 3 months of last provider visit (or a total of 15 months).             Passed - A1C in last 6 months     Hemoglobin A1c   Date Value Ref Range Status   01/21/2021 6.6 (H) <=5.6 % Final

## 2021-06-15 NOTE — TELEPHONE ENCOUNTER
Refill Approved    Rx renewed per Medication Renewal Policy. Medication was last renewed on 2/26/20.    Ryder Echavarria, [facility] Triage/Med Refill 3/3/2021     Requested Prescriptions   Pending Prescriptions Disp Refills     atorvastatin (LIPITOR) 40 MG tablet 90 tablet 3     Sig: TAKE 1 TABLET(40 MG) BY MOUTH AT BEDTIME       Statins Refill Protocol (Hmg CoA Reductase Inhibitors) Passed - 3/2/2021 12:08 PM        Passed - PCP or prescribing provider visit in past 12 months      Last office visit with prescriber/PCP: 1/21/2021 Magalis Tucker PA-C OR same dept: 1/21/2021 Magalis Tucker PA-C OR same specialty: 1/21/2021 Magalis Tucker PA-C  Last physical: Visit date not found Last MTM visit: Visit date not found   Next visit within 3 mo: Visit date not found  Next physical within 3 mo: Visit date not found  Prescriber OR PCP: Magalis Tucker PA-C  Last diagnosis associated with med order: 1. Pure hypercholesterolemia  - atorvastatin (LIPITOR) 40 MG tablet  Dispense: 90 tablet; Refill: 3    If protocol passes may refill for 12 months if within 3 months of last provider visit (or a total of 15 months).

## 2021-06-15 NOTE — TELEPHONE ENCOUNTER
Refill Approved    Rx renewed per Medication Renewal Policy. Medication was last renewed on 9/9/2020.    Alivia Newell, [facility] Triage/Med Refill 3/10/2021     Requested Prescriptions   Pending Prescriptions Disp Refills     TRESIBA FLEXTOUCH U-100 100 unit/mL (3 mL) injection pen [Pharmacy Med Name: TRESIBA FLEXTOUCH 100 UNITS 100 Solution Pen-injector] 15 mL 2     Sig: INJECT 24 UNITS UNDER THE SKIN AT BEDTIME       Insulin/GLP-1 Refill Protocol Passed - 3/10/2021 10:41 AM        Passed - Visit with PCP or prescribing provider visit in last 6 months     Last office visit with prescriber/PCP: 1/21/2021 OR same dept: 1/21/2021 Magalis Tucker PA-C OR same specialty: 1/21/2021 Magalis Tucker PA-C Last physical: Visit date not found Last MTM visit: Visit date not found     Next appt within 3 mo: Visit date not found  Next physical within 3 mo: Visit date not found  Prescriber OR PCP: Magalis Tucker PA-C  Last diagnosis associated with med order: 1. Diabetes mellitus due to underlying condition with hyperglycemia (H)  - TRESIBA FLEXTOUCH U-100 100 unit/mL (3 mL) injection pen [Pharmacy Med Name: TRESIBA FLEXTOUCH 100 UNITS 100 Solution Pen-injector]; INJECT 24 UNITS UNDER THE SKIN AT BEDTIME  Dispense: 15 mL; Refill: 2    If protocol passes may refill for 6 months if within 3 months of last provider visit (or a total of 9 months).              Passed - A1C in last 6 months     Hemoglobin A1c   Date Value Ref Range Status   01/21/2021 6.6 (H) <=5.6 % Final               Passed - Microalbumin in last year     Microalbumin, Random Urine   Date Value Ref Range Status   01/21/2021 1.79 0.00 - 1.99 mg/dL Final                  Passed - Blood pressure in last year     BP Readings from Last 1 Encounters:   01/21/21 147/75             Passed - Creatinine done in last year     Creatinine   Date Value Ref Range Status   01/21/2021 1.62 (H) 0.70 - 1.30 mg/dL Final

## 2021-06-15 NOTE — TELEPHONE ENCOUNTER
Hello,    In order to complete the Medicare renewal for the patient we need a new Freestyle Rowan 14 Day Sensor prescription.     The prescription sent over was previously for a Freestyle Rowan 14 Day [place].    Please prescribe: Freestyle Rowan 14 Day Sensors  Quantity: 2  Refills: 5  Directions: Change every 14 days    Thank you,  [facility] Team  [phone]

## 2021-06-16 PROBLEM — Z72.0 CHEWING TOBACCO USE: Status: ACTIVE | Noted: 2021-01-31

## 2021-06-16 PROBLEM — N40.1 BENIGN PROSTATIC HYPERPLASIA WITH LOWER URINARY TRACT SYMPTOMS: Status: ACTIVE | Noted: 2021-02-08

## 2021-06-16 PROBLEM — N43.40 SPERMATOCELE: Status: ACTIVE | Noted: 2021-02-08

## 2021-06-16 PROBLEM — N50.89 MASS OF LEFT TESTICLE: Status: ACTIVE | Noted: 2021-01-21

## 2021-06-16 NOTE — TELEPHONE ENCOUNTER
I called and left a Message for patient to return call. Upon call back please inform patient Magalis received a form from [employer] for a physican's order for CPAP supplies. Patient will need to see sleep clinic for these supplies. Magalis had place a referral for patient. Someone will be calling him to make an appointment.

## 2021-06-17 NOTE — TELEPHONE ENCOUNTER
"Refill Approved    Rx renewed per Medication Renewal Policy. Medication was last renewed on 10/29/20.    Ryder Echavarria, [facility] Triage/Med Refill 5/7/2021     Requested Prescriptions   Pending Prescriptions Disp Refills     BD ULTRA-FINE EMMETT PEN NEEDLE 32 gauge x 5/32\" Ndle [Pharmacy Med Name: BD ULTRA-FINE PEN NDL 4MMX3 32G X 4 MM Miscellaneous] 200 each 3     Sig: USE AS DIRECTED       Diabetic Supplies Refill Protocol Passed - 5/7/2021  8:25 AM        Passed - Visit with PCP or prescribing provider visit in last 6 months     Last office visit with prescriber/PCP: 1/21/2021 Magalis Tucker PA-C OR same dept: 1/21/2021 Magalis Tucker PA-C OR same specialty: 1/21/2021 Magalis Tucker PA-C  Last physical: Visit date not found Last MTM visit: Visit date not found   Next visit within 3 mo: Visit date not found  Next physical within 3 mo: Visit date not found  Prescriber OR PCP: Magalis Tucker PA-C  Last diagnosis associated with med order: 1. Type 2 diabetes mellitus without complication, without long-term current use of insulin (H)  - BD ULTRA-FINE EMMETT PEN NEEDLE 32 gauge x 5/32\" Ndle [Pharmacy Med Name: BD ULTRA-FINE PEN NDL 4MMX3 32G X 4 MM Miscellaneous]; USE AS DIRECTED  Dispense: 200 each; Refill: 3    If protocol passes may refill for 12 months if within 3 months of last provider visit (or a total of 15 months).             Passed - A1C in last 6 months     Hemoglobin A1c   Date Value Ref Range Status   04/12/2021 6.6 (H) <=5.6 % Final                              "

## 2021-06-17 NOTE — TELEPHONE ENCOUNTER
RN cannot approve Refill Request    RN can NOT refill this medication early refill request for medicare renewal.  Outside protocol window. Last office visit: 1/21/2021 Magalis Tucker PA-C Last Physical: Visit date not found Last MTM visit: Visit date not found Last visit same specialty: 1/21/2021 Magalis Tucker PA-C.  Next visit within 3 mo: Visit date not found  Next physical within 3 mo: Visit date not found      Ryder Echavarria, [facility] Triage/Med Refill 5/14/2021    Requested Prescriptions   Pending Prescriptions Disp Refills     flash glucose sensor (FREESTYLE ESTRADA 14 DAY SENSOR) Kit 6 kit 3     Sig: Use 1 each As Directed every 14 (fourteen) days.       Diabetic Supplies Refill Protocol Passed - 5/14/2021 12:20 PM        Passed - Visit with PCP or prescribing provider visit in last 6 months     Last office visit with prescriber/PCP: 1/21/2021 Magalis Tucker PA-C OR same dept: 1/21/2021 Magalis Tucker PA-C OR same specialty: 1/21/2021 Magalis Tucker PA-C  Last physical: Visit date not found Last MTM visit: Visit date not found   Next visit within 3 mo: Visit date not found  Next physical within 3 mo: Visit date not found  Prescriber OR PCP: Magalis Tucker PA-C  Last diagnosis associated with med order: 1. Type 2 diabetes mellitus without complication, with long-term current use of insulin (H)  - flash glucose sensor (FREESTYLE ESTRADA 14 DAY SENSOR) Kit; Use 1 each As Directed every 14 (fourteen) days.  Dispense: 6 kit; Refill: 3    If protocol passes may refill for 12 months if within 3 months of last provider visit (or a total of 15 months).             Passed - A1C in last 6 months     Hemoglobin A1c   Date Value Ref Range Status   04/12/2021 6.6 (H) <=5.6 % Final

## 2021-06-18 NOTE — PROGRESS NOTES
Hyperlipidemia on statin denies muscle pain  Diabetes denies polyuria.  Renal insufficiency denies nausea or vomiting  Proteinuria on ACE inhibitor denies cough    Still consumes coke or sweets    Says for light headed at 150s    Describes episode of vertigo when looking over a ledge    Can walk a block.    ROS: as noted above    OBJECTIVE:   Vitals:    06/01/18 1506   BP: 117/66   Pulse: 93   Resp: 20      Wt is noted.  No diaphoresis  Eyes: nl eom, anicteric   External ears, nose: nl    Neck: nl nodes, supple, thyroid normal   Lungs: clear to ausc   Heart: regular rhythm  Abd: soft nontender     No cva (renal) tenderness  Neuro: no weakness  Skin no rash  Joints: uninflamed   No ketotic breath odor noted  Mental: euthymic  Ext: nontender calves   Gait: normal    Bmi:34 stable  ASSESSMENT/PLAN:    1. Type 2 diabetes mellitus without complication  Glycosylated Hemoglobin A1c    Microalbumin, Random Urine   2. Pure hypercholesterolemia  LDL Cholesterol, Direct   3. Essential hypertension     4. Renal Insufficiency  Comprehensive Metabolic Panel    HM2(CBC w/o Differential)   5. Proteinuria  Comprehensive Metabolic Panel     Life style modification re wt   Diet  Likely bpv and will let us know if persists for canalith

## 2021-06-18 NOTE — LETTER
Letter by Sincere Purvis MD at      Author: Sincere Purvis MD Service: -- Author Type: --    Filed:  Encounter Date: 3/7/2019 Status: (Other)       Goyo Cole  [address] [place] [address]             March 7, 2019        Dear Mr. Cole,    Below are the results from your recent visit:    Resulted Orders   Glycosylated Hemoglobin A1c   Result Value Ref Range    Hemoglobin A1c 6.9 (H) 3.5 - 6.0 %   Comprehensive Metabolic Panel   Result Value Ref Range    Sodium 140 136 - 145 mmol/L    Potassium 4.4 3.5 - 5.0 mmol/L    Chloride 104 98 - 107 mmol/L    CO2 26 22 - 31 mmol/L    Anion Gap, Calculation 10 5 - 18 mmol/L    Glucose 133 (H) 70 - 125 mg/dL    BUN 23 8 - 28 mg/dL    Creatinine 1.59 (H) 0.70 - 1.30 mg/dL    GFR MDRD Af Amer 52 (L) >60 mL/min/1.73m2    GFR MDRD Non Af Amer 43 (L) >60 mL/min/1.73m2    Bilirubin, Total 0.6 0.0 - 1.0 mg/dL    Calcium 9.7 8.5 - 10.5 mg/dL    Protein, Total 6.9 6.0 - 8.0 g/dL    Albumin 3.7 3.5 - 5.0 g/dL    Alkaline Phosphatase 54 45 - 120 U/L    AST 18 0 - 40 U/L    ALT 25 0 - 45 U/L    Narrative    Fasting Glucose reference range is 70-99 mg/dL per  American Diabetes Association (ADA) guidelines.       Lab results are stable. Check in four months.  Continue the good work.      Please call with questions or contact us using [employer].    Sincerely,        Electronically signed by Sincere Purvis MD

## 2021-06-19 NOTE — PROGRESS NOTES
Assessment: Follow up with Goyo and wife Cate to assess bg, A1c, intake and medication adherence.  Brings in log book, bg noted below, Last A1c  Noted 6.2018 at 7.6, nice drop from 8.8.    Current dm medications:  Tresiba U 100 20 units daily  Novolog 6/8/8  Metformin 500 mg two times a day  pioglitazone 30 mg daily    A1c 7.6    BG:   Fasting   Lunch   Dinner  147          ----       191  158          ----       173  186          197      167  153          136      ----  157          197      155  149          -----      191  169          185      140  167    Last seen last fall, MD visit this past June. Taking tresiba 20 units daily,  if eating away from home not take insulin at meal, eating out 1-2 times per week. Understands he should bring insulin with him but refuses to do this.  BG noted above, reports taking insulin at meals he does not check, logs clearly in his book when he does not take insulin.  Independent with insulin administration, storage and expiration.      Reports sx of what he thinks are low bg after lunch and sometimes dinner, however does not check bg to verify.  Saw MD and discussion about vertigo given sx, instructed to check bg when feeling this way to rule out low bg, cannot assume this and TX, which has resulted in elevated bg at next check.  Wife Cate firmly believes he has vertigo given sx, when this happens and how often this has been happening.  To see chiropractor today, will discuss with him and possibility of alignment causing these sx.  Strongly encouraged to follow up with MD to assess and dx the vertigo and obtain proper tx.     No real change in intake, Cate is quite knowledgeable in regard to nutrition, cho, fat and label reading.  Goyo eats basically what Cate makes, which is low fat, low cho, lean meat and lots of vege, what he eats out is not in her control. Has been much better about sweet intake and pop, this is on rare occasion.   Both are currently attending diabetes  mgmt classes offered by [facility], free of charge.  6 week course, group setting on basics of dm vanessa, Goyo is not always fond of group settings but has been attending and participating.    Bg noted above.    Medication adjustments per protocol  Increased supper insulin to 10 units will continue with 6 at breakfast and 8 at lunch.  ( per protocol increase by 1-2 units with bg 131-200 mg/dl.)    Two of the above elevated lunch readings are due to no insulin at breakfast (dining out)    Both Cate and Goyo report running out of supplies, meter supplies and pen needles, updated all supplies for the next year. Instructed medications are not supplied for year, need to be seen at least twice per year with diabetes.      Plan:  Follow up with CDE 10.2018  Cate will e-mail or my chart bg in one month.     Subjective and Objective:      Goyo Cole is referred by Dr. Purvis for Diabetes Education.     Lab Results   Component Value Date    HGBA1C 7.6 (H) 06/01/2018         Current diabetes medications:    Tresiba U 100 20 units daily  Novolog 6/8/10  Metformin 500 mg two times a day  pioglitazone 30 mg daily    Goals       medication            1. Start 10 units of Tresiba tonight at HS  MET  2. Stop glipizide MET  3. Increase actos to two 15 mg pills MET  4. Change metformin to 500 mg tablets bid MET        monitoring            1. Check bg once daily rotate time of check. MET            Follow up:   CDE (certified diabetic educator)      Education:     Monitoring   Meter (per above goals): Competent  Monitoring: Discussed  BG goals: Assessed, Discussed and Literature provided    Nutrition Management  Nutrition Management: Assessed and Discussed  Weight: Assessed  Portions/Balance: Discussed and Competent  Carb ID/Count: Discussed  Label Reading: Competent  Heart Healthy Fats: Discussed and Competent  Menu Planning: Assessed  Dining Out: Discussed  Physical Activity: Not addressed  Medications: Discussed and Competent  Orals:  Discussed and Competent  Injected Medications: Competent   Storage/Exp:Competent   Site Rotation: Competent    Acute Complications: Prevent, Detect, Treat:  Hypoglycemia: Assessed and Discussed  Hyperglycemia: Assessed and Discussed  Sick Days: Not addressed    Chronic Complications  Foot Care:Discussed  Skin Care: Not addressed  Eye: Discussed  ABC: Assessed and Discussed  Teeth:Discussed  Goal Setting and Problem Solving: Assessed  Barriers: Assessed  Psychosocial Adjustments: Assessed      Time spent with the patient: 60 minutes for diabetes education and counseling.   Previous Education: yes  Visit Type:DSMT  Hours Remaining: DSMT1, MNT 2      Leisa Sainz  7/18/2018
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
## 2021-06-20 NOTE — LETTER
Letter by Sincere Purvis MD at      Author: Sincere Purvis MD Service: -- Author Type: --    Filed:  Encounter Date: 2/13/2020 Status: (Other)         Goyo Cole  [address] [place] [address]             February 13, 2020        Dear Mr. Cole,    Below are the results from your recent visit:    Resulted Orders   Microalbumin, Random Urine   Result Value Ref Range    Microalbumin, Random Urine 4.30 (H) 0.00 - 1.99 mg/dL    Creatinine, Urine 176.5 mg/dL    Microalbumin/Creatinine Ratio Random Urine 24.4 (H) <=19.9 mg/g    Narrative    Microalbumin, Random Urine  <2.0 mg/dL . . . . . . . . Normal  3.0-30.0 mg/dL . . . . . . Microalbuminuria  >30.0 mg/dL . . . . . .  . Clinical Proteinuria    Microalbumin/Creatinine Ratio, Random Urine  <20 mg/g . . . . .. . . . Normal   mg/g . . . . . . . Microalbuminuria  >300 mg/g . . . . . . . . Clinical Proteinuria       Glycosylated Hemoglobin A1c   Result Value Ref Range    Hemoglobin A1c 6.4 (H) 3.5 - 6.0 %   Comprehensive Metabolic Panel   Result Value Ref Range    Sodium 141 136 - 145 mmol/L    Potassium 4.8 3.5 - 5.0 mmol/L    Chloride 104 98 - 107 mmol/L    CO2 28 22 - 31 mmol/L    Anion Gap, Calculation 9 5 - 18 mmol/L    Glucose 163 (H) 70 - 125 mg/dL    BUN 19 8 - 28 mg/dL    Creatinine 1.50 (H) 0.70 - 1.30 mg/dL    GFR MDRD Af Amer 56 (L) >60 mL/min/1.73m2    GFR MDRD Non Af Amer 46 (L) >60 mL/min/1.73m2    Bilirubin, Total 0.6 0.0 - 1.0 mg/dL    Calcium 10.3 8.5 - 10.5 mg/dL    Protein, Total 7.2 6.0 - 8.0 g/dL    Albumin 4.0 3.5 - 5.0 g/dL    Alkaline Phosphatase 63 45 - 120 U/L    AST 22 0 - 40 U/L    ALT 30 0 - 45 U/L    Narrative    Fasting Glucose reference range is 70-99 mg/dL per  American Diabetes Association (ADA) guidelines.   LDL Cholesterol, Direct   Result Value Ref Range    Direct LDL 42 <=129 mg/dl       Lab results are stable.  The diabetes is under very good control.  It would be reasonable to dial down the insulin doses a bit and  checking in couple months.      Please call with questions or contact us using [employer].    Sincerely,        Electronically signed by Sincere Purvis MD
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
## 2021-06-23 NOTE — PROGRESS NOTES
Assessment: Follow up with Goyo and wife Cate to assess bg, a1c and intake, reports taking tresiba 22 units at HS and novolog at meals, 7/8/10.   He also takes Metformin 500 mg two times a day,  Pioglitazone 30 mg daily.      He does have few occ when he skips noon insulin if eating out.  Reports mild symptoms of hypoglycemia once in 2-3 weeks.   He reports being sedentary.  He is testing BG levels 4 times daily.       FBS:  156, 135, 148, 153, 153, 164, 160, 151  Pre lunch:  146, 171, 153, 207, 172, 135, 219, 150, 146,  Pre Dinner:  160,  119 (shoveling snow) , 175, 140, 154, 203,     Breakfast:   oatmeal, banana, cocoa powder, honey   or eggs            Lunch:  sandwich, meal/cheese,  whole grain, lettuce, pretzels, mac & cheese                  Dinner:   lean protein, vegetable, rice/sw. potatoe/        Bedtime snack:  occ    Plan:  Following protocol to increase insulin:  Tresiba to 24 units once daily, and Novolog at meals:  8/9/10 units.   Discussed nature of insulin and advised to take 1 less units insulin for meal preceding time when he plans to be more active, i.e. work in yard, shovel snow ,etc.  Reviewed signs/treatment for hypoglcyemia.    Reviewed nutrition guidelines and healthy carbs.  He is interested in the Freestyle andrew cgm.  Will submit to see what insurance coverage is.        Subjective and Objective:      Goyo Cole is referred by Dr. Purvis for Diabetes Education.     Lab Results   Component Value Date    HGBA1C 7.6 (H) 06/01/2018     Goals:   increase insulin as noted above               Continue to test BG 4 times daily      Follow up:  3-4 weeks    Education:     Monitoring   Meter (per above goals): assessment, discussed, pt returned demonstration,  Monitoring: assessment, discussed, pt returned demonstration, literature provided   BG goals: assessment, discussed, pt returned demonstration, literature provided      Nutrition Management:  assessment, discussed, pt returned demo   literature provided   Weight:assessment, discussed, pt returned demo,  literature provided   Portions/Balance: assessment, discussed, pt returned demo,  literature provided   Carb ID/Count: assessment, discussed, pt returned demo,  literature provided   Label Reading: assessment, discussed, pt returned demo,  literature provided   Heart Healthy Fats:assessment, discussed, pt returned demo,  literature provided   Menu Planning: assessment, discussed, pt returned demo,  literature provided   Dining Out: assessment, discussed,  literature provided   Physical Activity: assessment, discussed,  literature provided   Medications: assessment, discussed  Orals: assessment, discussed  Injected Medications: Assessed, Discussed and Literature provided   Storage/Exp:Discussed   Site Rotation: Discussed and Literature provided     Diabetes Disease Process: Discussed, Literature provided and Patient returned demonstration    Acute Complications: Prevent, Detect, Treat:  Hypoglycemia: Discussed literature  Hyperglycemia: Discussed and Literature provided  Sick Days: Literature provided    Chronic Complications  Foot Care: literature provided  Skin Care: Literature provided  Eye: Literature provided  ABC: Literature provided  Teeth:Literature provided  Goal Setting and Problem Solving: Assessed and Discussed  Barriers: Discussed  Psychosocial Adjustments: Assessed and Discussed      Time spent with the patient: 60 minutes for diabetes education and counseling.   Previous Education: yes  Visit Type:COLLEEN Quan  2/11/2019

## 2021-06-24 NOTE — TELEPHONE ENCOUNTER
Patient Returning Call  Reason for call:  Faisal from [facility] calling to check status of previous note  Information relayed to patient:  Relayed below notes from clinical staff- Caller verbalized understanding.  Patient has additional questions:  Yes  If YES, what are your questions/concerns:   Faisal is concerned that 03/07/19 appointment will be a little late for patient due to it being diabetic supplies.   Okay to leave a detailed message?: Yes

## 2021-06-24 NOTE — PROGRESS NOTES
complains of bilateral upper lateral abdomen without visible      Night time.   Not during day.  Always bilateral.  Worried about kidney function as you tube said kidney issue.    novolog 8 am   9 lunch 11 at dinner.  Watches and consistent diet.   Strive for 38 jeans    And to stay at 250.  I don't over indulge but does have some.   Two little snickers at lunch.  Log book shows sugars responds to exercise.  Trying to adjust dose which is exercise dependent.  Needs a different device.    chiro neck adjust    Notices sugar variations with activities and foods.    Hyperlipidemia on statin denies muscle pain  Obesity denies polyuria    damn sure I'm tired of sticking myself.      Watching tv    Jan 2018 3 tubular polyps    follow up 2021     Hypertension denies chest pain or headache.   Renal insufficiency denies nausea or vomiting      OBJECTIVE:   Vitals:    03/07/19 0854   BP: 124/62   Pulse: 80   Resp: 20      Wt is noted.  No diaphoresis  Eyes: nl eom, anicteric   External ears, nose: nl    Neck: nl nodes, supple, thyroid normal   Lungs: clear to ausc   Heart: regular rhythm  Abd: soft nontender     No cva (renal) tenderness  Neuro: no weakness  Skin no rash  Joints: uninflamed   No ketotic breath odor noted  Mental: euthymic  Ext: nontender calves   Gait: normal    Body mass index is 34.86 kg/m .  ASSESSMENT/PLAN:    1. Type 2 diabetes mellitus without complication, with long-term current use of insulin (H)  Glycosylated Hemoglobin A1c   2. Pure hypercholesterolemia     3. Essential hypertension  Comprehensive Metabolic Panel   4. Renal Insufficiency  Comprehensive Metabolic Panel   5. Class 2 severe obesity due to excess calories with serious comorbidity in adult, unspecified BMI (H)       Chronic issues stable/ same treatment.  Four months or per lab  Life style modification   Colonoscopy due 2021

## 2021-06-24 NOTE — TELEPHONE ENCOUNTER
Patient has a future appointment on March 7, 2019 at 9:00am with Dr. Purvis for follow up for diabetes. Completing task.

## 2021-06-24 NOTE — TELEPHONE ENCOUNTER
Dr. Purvis received form from [facility] for a prescription request for glucose monitor, per Dr. Purvis said pt needs to be seen. Please call for an appt. Thanks.

## 2021-06-26 ENCOUNTER — HEALTH MAINTENANCE LETTER (OUTPATIENT)
Age: 76
End: 2021-06-26

## 2021-07-03 NOTE — ADDENDUM NOTE
Addendum Note by Gigi Stafford MD at 5/11/2017  7:38 AM     Author: Gigi Stafford MD Service: -- Author Type: Physician    Filed: 5/11/2017  7:38 AM Encounter Date: 5/10/2017 Status: Signed    : Gigi Stafford MD (Physician)    Addended by: GIGI STAFFORD on: 5/11/2017 07:38 AM        Modules accepted: Orders

## 2021-07-03 NOTE — ADDENDUM NOTE
Addendum Note by Stephanie Otero CMA at 4/22/2020  7:32 AM     Author: Stephanie Otero CMA Service: -- Author Type: Medical Assistant    Filed: 4/22/2020  7:32 AM Encounter Date: 4/21/2020 Status: Signed    : Stephanie Otero CMA (Medical Assistant)    Addended by: STEPHANIE OTERO on: 4/22/2020 07:32 AM        Modules accepted: Orders

## 2021-07-09 ENCOUNTER — OFFICE VISIT - HEALTHEAST (OUTPATIENT)
Dept: FAMILY MEDICINE | Facility: CLINIC | Age: 76
End: 2021-07-09

## 2021-07-09 DIAGNOSIS — E08.65 DIABETES MELLITUS DUE TO UNDERLYING CONDITION WITH HYPERGLYCEMIA, WITH LONG-TERM CURRENT USE OF INSULIN (H): ICD-10-CM

## 2021-07-09 DIAGNOSIS — E66.09 CLASS 1 OBESITY DUE TO EXCESS CALORIES WITH SERIOUS COMORBIDITY AND BODY MASS INDEX (BMI) OF 32.0 TO 32.9 IN ADULT: ICD-10-CM

## 2021-07-09 DIAGNOSIS — Z79.4 TYPE 2 DIABETES MELLITUS WITHOUT COMPLICATION, WITH LONG-TERM CURRENT USE OF INSULIN (H): ICD-10-CM

## 2021-07-09 DIAGNOSIS — E66.01 CLASS 2 SEVERE OBESITY DUE TO EXCESS CALORIES WITH SERIOUS COMORBIDITY IN ADULT, UNSPECIFIED BMI (H): ICD-10-CM

## 2021-07-09 DIAGNOSIS — N28.9 DISORDER OF KIDNEY AND URETER: ICD-10-CM

## 2021-07-09 DIAGNOSIS — E66.811 CLASS 1 OBESITY DUE TO EXCESS CALORIES WITH SERIOUS COMORBIDITY AND BODY MASS INDEX (BMI) OF 32.0 TO 32.9 IN ADULT: ICD-10-CM

## 2021-07-09 DIAGNOSIS — E66.812 CLASS 2 SEVERE OBESITY DUE TO EXCESS CALORIES WITH SERIOUS COMORBIDITY IN ADULT, UNSPECIFIED BMI (H): ICD-10-CM

## 2021-07-09 DIAGNOSIS — Z79.4 DIABETES MELLITUS DUE TO UNDERLYING CONDITION WITH HYPERGLYCEMIA, WITH LONG-TERM CURRENT USE OF INSULIN (H): ICD-10-CM

## 2021-07-09 DIAGNOSIS — Z72.0 CHEWING TOBACCO USE: ICD-10-CM

## 2021-07-09 DIAGNOSIS — E11.9 TYPE 2 DIABETES MELLITUS WITHOUT COMPLICATION, WITH LONG-TERM CURRENT USE OF INSULIN (H): ICD-10-CM

## 2021-07-09 LAB
ANION GAP SERPL CALCULATED.3IONS-SCNC: 11 MMOL/L (ref 5–18)
BUN SERPL-MCNC: 19 MG/DL (ref 8–28)
CALCIUM SERPL-MCNC: 9.6 MG/DL (ref 8.5–10.5)
CHLORIDE BLD-SCNC: 102 MMOL/L (ref 98–107)
CO2 SERPL-SCNC: 27 MMOL/L (ref 22–31)
CREAT SERPL-MCNC: 1.46 MG/DL (ref 0.7–1.3)
GFR SERPL CREATININE-BSD FRML MDRD: 47 ML/MIN/1.73M2
GLUCOSE BLD-MCNC: 144 MG/DL (ref 70–125)
HBA1C MFR BLD: 6.7 %
POTASSIUM BLD-SCNC: 4.5 MMOL/L (ref 3.5–5)
SODIUM SERPL-SCNC: 140 MMOL/L (ref 136–145)

## 2021-07-10 VITALS
DIASTOLIC BLOOD PRESSURE: 62 MMHG | HEART RATE: 92 BPM | SYSTOLIC BLOOD PRESSURE: 126 MMHG | WEIGHT: 261 LBS | BODY MASS INDEX: 32.79 KG/M2

## 2021-07-11 NOTE — PROGRESS NOTES
Progress Notes by Magalis Tucker PA-C at 7/9/2021 11:40 AM     Author: Magalis Tucker PA-C Service: -- Author Type: Physician Assistant    Filed: 7/10/2021  8:46 PM Encounter Date: 7/9/2021 Status: Signed    : Magalis Tucker PA-C (Physician Assistant)         Subjective:    Goyo Cole is a 75 y.o. male who presents with chief complaint of diabetes check.  He and his wife say that Medicare is requiring that he come in for diabetes check.  Last labs done 3 months ago were good.  He is taking on average 7 to 8 units of insulin at mealtimes, 22 units of insulin at night.  He is already on max doses of Metformin and Actos.  Diabetic foot exam completed today.  He was having some muscle spasms in the left upper back, scapular area, that is improving.  Does chew tobacco.  Does not smoke cigarettes.  Diabetic eye exam scheduled for September 2021.  He is interested in an insulin pump or recon meter.  He right now he has the freestyle andrew meter.  Sometimes does not stick enough and it falls off his arm.  Other times he notices a discrepancy between the meter and when he pokes his finger.  Recently had a sugar of 284, then when he poked his finger is 215.  He also notes that they are usually a few days a month that he runs out of the freestyle andrew before he can get a replacement, so he ends up having to poke his finger anyway.  He is having trouble getting in testing supplies covered by insurance, because he is also using the freestyle andrew meter.  He is possibly interested in going back to just using fingersticks all the time.  Over treating with insulin    Patient Active Problem List   Diagnosis   ? Fatigue   ? Type 2 diabetes mellitus without complication (H)   ? Obesity   ? Pure hypercholesterolemia   ? Essential Hypertension   ? Renal Insufficiency   ? Proteinuria   ? Male Erectile Disorder   ? Esophageal Reflux   ? Degenerative joint disease (DJD) of hip   ? ELIOT on CPAP   ? Mass of  "left testicle   ? Chewing tobacco use   ? Benign prostatic hyperplasia with lower urinary tract symptoms   ? Left Spermatocele       Current Outpatient Medications:   ?  acetaminophen (TYLENOL) 500 MG tablet, Take 1,000 mg by mouth every 6 (six) hours., Disp: , Rfl:   ?  ASCORBIC ACID ORAL, Take 1 tablet by mouth daily., Disp: , Rfl:   ?  aspirin 81 MG EC tablet, Take 81 mg by mouth daily., Disp: , Rfl:   ?  atorvastatin (LIPITOR) 40 MG tablet, TAKE 1 TABLET(40 MG) BY MOUTH AT BEDTIME, Disp: 90 tablet, Rfl: 3  ?  BD ULTRA-FINE EMMETT PEN NEEDLE 32 gauge x 5/32\" Ndle, USE AS DIRECTED, Disp: 300 each, Rfl: 2  ?  cholecalciferol, vitamin D3, (CHOLECALCIFEROL) 1,000 unit tablet, Take 1,000 Units by mouth daily., Disp: , Rfl:   ?  CONTOUR NEXT TEST STRIPS strips, CHECK BLOOD SUGAR BEFORE MEALS AND AT BEDTIME, Disp: 120 strip, Rfl: 10  ?  flash glucose scanning reader (FREESTYLE ESTRADA 14 DAY READER) Misc, Use 1 application As Directed every 14 (fourteen) days. Test every 8 hours Per  instructions., Disp: 2 each, Rfl: 5  ?  flash glucose sensor (FREESTYLE ESTRADA 14 DAY SENSOR) Kit, Use 1 each As Directed every 14 (fourteen) days., Disp: 6 kit, Rfl: 3  ?  insulin aspart U-100 (NOVOLOG FLEXPEN U-100 INSULIN) 100 unit/mL (3 mL) injection pen, INJECT 7 UNITS AT BREAKFAST, 8 UNITS AT LUNCH, AND 10 UNITS AT DINNER, Disp: 30 mL, Rfl: 1  ?  metFORMIN (GLUCOPHAGE) 500 MG tablet, TAKE 2 TABLETS(1000 MG) BY MOUTH TWICE DAILY WITH MEALS, Disp: 360 tablet, Rfl: 3  ?  MICROLET LANCET, USE TO CHECK BLOOD SUGAR QID as needed, Disp: 100 each, Rfl: 3  ?  pioglitazone (ACTOS) 30 MG tablet, Take 1 tablet (30 mg total) by mouth daily., Disp: 90 tablet, Rfl: 3  ?  TRESIBA FLEXTOUCH U-100 100 unit/mL (3 mL) injection pen, INJECT 24 UNITS UNDER THE SKIN AT BEDTIME, Disp: 15 mL, Rfl: 3  ?  docusate sodium (COLACE) 100 MG capsule, Take 100 mg by mouth 2 (two) times a day., Disp: , Rfl:   ?  miscellaneous medical supply (BLOOD PRESSURE " CUFF) Misc, Please provide patient with automatic blood pressure cuff.  Diagnosis: hypertension, Disp: 1 each, Rfl: 0     Objective:   Allergies:  Patient has no known allergies.    Vitals:  Vitals:    07/09/21 1145   BP: 126/62   Patient Site: Left Arm   Patient Position: Sitting   Cuff Size: Adult Regular   Pulse: 92   Weight: (!) 261 lb (118.4 kg)     Body mass index is 32.79 kg/m .    Vital signs reviewed.  General: Patient is alert and oriented x 3, in no apparent distress  Cardiac: regular rate and rhythm, no murmurs  Pulmonary: lungs clear to auscultation bilaterally, no crackles, rales, rhonchi, or wheezing noted  Diabetic Foot Exam:  Normal Exam.  Normal pedal pulses bilaterally.  Skin appears healthy and without significant injury.  Sensation is intact to monofilament bilaterally.      Results for orders placed or performed in visit on 07/09/21   Glycosylated Hemoglobin A1c   Result Value Ref Range    Hemoglobin A1c 6.7 (H) <=5.6 %   Other labs pending.    Assessment and Plan:   1. Type 2 diabetes mellitus without complication, with long-term current use of insulin (H)  A1c: 6.7%, stable since last check of 6.6% 3 months ago  Eye Exam: Has eye exam scheduled for September 2021  Foot Exam: Done today, normal  Smoking: Non-smoker, does chew tobacco, we did discuss risks of this, he does go to the dentist regularly  On a statin: Yes  Blood Pressure: Well controlled  Microalbumin: Up-to-date  On ACE inhibitor: no, consider at next visit, monitor creatinine  Continue same diabetes medicines.  I will follow-up with our pharmacist regarding patient's issues with Freestyle Rowan meter.  Next visit in 5 months.  - Basic Metabolic Panel  - Glycosylated Hemoglobin A1c  -  DIABETES FOOT EXAM    2. Renal Insufficiency  Follow-up with pending labs.  - Basic Metabolic Panel  - Glycosylated Hemoglobin A1c    3.  Obesity.  He does have serious comorbidity of diabetes.  I encouraged continued healthy eating and  exercise.    4.  Chewing tobacco use.  We reviewed this does raise his risk of mouth cancer and other complications.  He is in the precontemplative phase for quitting.  He does visit the dentist regularly.    40 minutes spent on the date of the encounter doing chart review, history and exam, and documentation.      This dictation uses voice recognition software, which may contain typographical errors.

## 2021-08-10 DIAGNOSIS — Z79.4 TYPE 2 DIABETES MELLITUS WITH CHRONIC KIDNEY DISEASE, WITH LONG-TERM CURRENT USE OF INSULIN, UNSPECIFIED CKD STAGE (H): Primary | ICD-10-CM

## 2021-08-10 DIAGNOSIS — E11.9 DIABETES (H): ICD-10-CM

## 2021-08-10 DIAGNOSIS — E11.22 TYPE 2 DIABETES MELLITUS WITH CHRONIC KIDNEY DISEASE, WITH LONG-TERM CURRENT USE OF INSULIN, UNSPECIFIED CKD STAGE (H): Primary | ICD-10-CM

## 2021-08-10 NOTE — TELEPHONE ENCOUNTER
Pending Prescriptions:                       Disp   Refills    metFORMIN (GLUCOPHAGE) 500 MG tablet      360 ta*3            Sig: [METFORMIN (GLUCOPHAGE) 500 MG TABLET] TAKE 2           TABLETS(1000 MG) BY MOUTH TWICE DAILY WITH MEALS

## 2021-08-12 NOTE — TELEPHONE ENCOUNTER
"Routing refill request to provider for review/approval because:  Early refill request    Last Written Prescription Date:  10/29/20  Last Fill Quantity: 360,  # refills: 3   Last office visit provider:  7/9/21     Requested Prescriptions   Pending Prescriptions Disp Refills     metFORMIN (GLUCOPHAGE) 500 MG tablet 360 tablet 3     Sig: [METFORMIN (GLUCOPHAGE) 500 MG TABLET] TAKE 2 TABLETS(1000 MG) BY MOUTH TWICE DAILY WITH MEALS       Biguanide Agents Failed - 8/10/2021  2:15 PM        Failed - Recent (6 mo) or future (30 days) visit within the authorizing provider's specialty     Patient had office visit in the last 6 months or has a visit in the next 30 days with authorizing provider or within the authorizing provider's specialty.  See \"Patient Info\" tab in inbasket, or \"Choose Columns\" in Meds & Orders section of the refill encounter.            Passed - Patient is age 10 or older        Passed - Patient has documented A1c within the specified period of time.     If HgbA1C is 8 or greater, it needs to be on file within the past 3 months.  If less than 8, must be on file within the past 6 months.     Recent Labs   Lab Test 07/09/21  1241   A1C 6.7*             Passed - Patient's CR is NOT>1.4 OR Patient's EGFR is NOT<45 within past 12 mos.     Recent Labs   Lab Test 07/09/21  1241   GFRESTIMATED 47*   GFRESTBLACK 57*       Recent Labs   Lab Test 07/09/21  1241   CR 1.46*             Passed - Patient does NOT have a diagnosis of CHF.        Passed - Medication is active on med list             Ryder Echavarria RN 08/12/21 1:50 PM  "

## 2021-09-20 ENCOUNTER — TRANSFERRED RECORDS (OUTPATIENT)
Dept: HEALTH INFORMATION MANAGEMENT | Facility: CLINIC | Age: 76
End: 2021-09-20

## 2021-09-20 LAB — RETINOPATHY: NEGATIVE

## 2021-10-01 DIAGNOSIS — E11.9 TYPE 2 DIABETES MELLITUS WITHOUT COMPLICATION, WITHOUT LONG-TERM CURRENT USE OF INSULIN (H): ICD-10-CM

## 2021-10-01 NOTE — TELEPHONE ENCOUNTER
Reason for Call:  Medication or medication refill:    Do you use a [facility]?  Name of the pharmacy and phone number for the current request:  [facility]    Name of the medication requested: pioglitazone    Other request:     Can we leave a detailed message on this number? Not Applicable    Phone number patient can be reached at: Home number on file [phone] (home)    Best Time: asap please    Call taken on 10/1/2021 at 12:13 PM by Nasrin Pearson

## 2021-10-04 RX ORDER — PIOGLITAZONEHYDROCHLORIDE 30 MG/1
30 TABLET ORAL DAILY
Qty: 90 TABLET | Refills: 3 | Status: SHIPPED | OUTPATIENT
Start: 2021-10-04 | End: 2022-10-04

## 2021-10-16 ENCOUNTER — HEALTH MAINTENANCE LETTER (OUTPATIENT)
Age: 76
End: 2021-10-16

## 2021-11-29 DIAGNOSIS — E08.65 DIABETES MELLITUS DUE TO UNDERLYING CONDITION WITH HYPERGLYCEMIA (H): ICD-10-CM

## 2021-12-01 RX ORDER — INSULIN DEGLUDEC 100 U/ML
INJECTION, SOLUTION SUBCUTANEOUS
Qty: 15 ML | Refills: 3 | Status: SHIPPED | OUTPATIENT
Start: 2021-12-01 | End: 2022-03-08

## 2021-12-01 NOTE — TELEPHONE ENCOUNTER
"Routing refill request to provider for review/approval because:  Due for diabetic f/u appointment    Last Written Prescription Date:  3/10/21  Last Fill Quantity: 15 mL,  # refills: 3   Last office visit provider:  7/9/21     Requested Prescriptions   Pending Prescriptions Disp Refills     insulin degludec (TRESIBA FLEXTOUCH) 100 UNIT/ML pen 15 mL 3     Sig: [TRESIBA FLEXTOUCH U-100 100 UNIT/ML (3 ML) INJECTION PEN] INJECT 24 UNITS UNDER THE SKIN AT BEDTIME       Long Acting Insulin Protocol Failed - 11/29/2021  4:52 PM        Failed - Recent (6 mo) or future (30 days) visit within the authorizing provider's specialty     Patient had office visit in the last 6 months or has a visit in the next 30 days with authorizing provider or within the authorizing provider's specialty.  See \"Patient Info\" tab in inbasket, or \"Choose Columns\" in Meds & Orders section of the refill encounter.            Passed - Serum creatinine on file in past 12 months     Recent Labs   Lab Test 07/09/21  1241   CR 1.46*       Ok to refill medication if creatinine is low          Passed - HgbA1C in past 3 or 6 months     If HgbA1C is 8 or greater, it needs to be on file within the past 3 months.  If less than 8, must be on file within the past 6 months.     Recent Labs   Lab Test 07/09/21  1241   A1C 6.7*             Passed - Medication is active on med list        Passed - Patient is age 18 or older             Sagrario Mabry RN 12/01/21 8:59 AM  "

## 2021-12-20 DIAGNOSIS — E11.9 TYPE 2 DIABETES MELLITUS WITHOUT COMPLICATION, WITHOUT LONG-TERM CURRENT USE OF INSULIN (H): ICD-10-CM

## 2021-12-21 ENCOUNTER — MEDICAL CORRESPONDENCE (OUTPATIENT)
Dept: HEALTH INFORMATION MANAGEMENT | Facility: CLINIC | Age: 76
End: 2021-12-21
Payer: MEDICARE

## 2021-12-22 RX ORDER — PEN NEEDLE, DIABETIC 32GX 5/32"
NEEDLE, DISPOSABLE MISCELLANEOUS
Qty: 300 EACH | Refills: 2 | Status: SHIPPED | OUTPATIENT
Start: 2021-12-22 | End: 2022-12-29

## 2021-12-22 NOTE — TELEPHONE ENCOUNTER
"Routing refill request to provider for review/approval because:  Early refill requested.    Last Written Prescription Date:  5/7/21  Last Fill Quantity: 300,  # refills: 2   Last office visit provider:  7/9/21     Requested Prescriptions   Pending Prescriptions Disp Refills     BD EMMETT U/F 32G X 4 MM insulin pen needle 300 each 2     Sig: Use  pen needles daily or as directed.       Diabetic Supplies Protocol Failed - 12/20/2021 12:09 PM        Failed - Recent (6 mo) or future (30 days) visit within the authorizing provider's specialty     Patient had office visit in the last 6 months or has a visit in the next 30 days with authorizing provider.  See \"Patient Info\" tab in inbasket, or \"Choose Columns\" in Meds & Orders section of the refill encounter.            Passed - Medication is active on med list        Passed - Patient is 18 years of age or older             Ryder Echavarria RN 12/22/21 2:39 PM  "

## 2021-12-27 DIAGNOSIS — E11.9 TYPE 2 DIABETES MELLITUS WITHOUT COMPLICATION, WITH LONG-TERM CURRENT USE OF INSULIN (H): ICD-10-CM

## 2021-12-27 DIAGNOSIS — Z79.4 TYPE 2 DIABETES MELLITUS WITHOUT COMPLICATION, WITH LONG-TERM CURRENT USE OF INSULIN (H): ICD-10-CM

## 2021-12-29 RX ORDER — FLASH GLUCOSE SENSOR
1 KIT MISCELLANEOUS
Qty: 6 EACH | Refills: 11 | Status: SHIPPED | OUTPATIENT
Start: 2021-12-29 | End: 2022-01-06

## 2021-12-29 NOTE — TELEPHONE ENCOUNTER
Routing refill request to provider for review/approval because:  Drug not on the [facility] refill protocol     Last Written Prescription Date:  7/15/21  Last Fill Quantity: 6,  # refills: 11   Last office visit provider:  21     Requested Prescriptions   Pending Prescriptions Disp Refills     Continuous Blood Gluc Sensor (FREESTYLE ESTRADA 14 DAY SENSOR) MISC 6 each 11     Si each every 14 days       There is no refill protocol information for this order          cordell brantley RN 21 3:01 PM

## 2021-12-29 NOTE — TELEPHONE ENCOUNTER
"Please send new Rx for Freestyle Rowan 14 Day Sensors   Please write Rx with these specifications for Medicare compliance    Freestyle Rowan 14 Day Sensors  Qty: 2  Refills: can have up to 5 additional refills  Sig \"CHANGE EVERY 14 DAYS\"    Please call [phone] and speak to one of our Durable Medical Equipment Team members if you have any questions.    "

## 2021-12-30 PROBLEM — N28.9 RENAL INSUFFICIENCY SYNDROME: Status: ACTIVE | Noted: 2021-12-30

## 2021-12-31 ENCOUNTER — TELEPHONE (OUTPATIENT)
Dept: FAMILY MEDICINE | Facility: CLINIC | Age: 76
End: 2021-12-31
Payer: MEDICARE

## 2021-12-31 NOTE — TELEPHONE ENCOUNTER
Reason for Call:  Other     Detailed comments:   needing office notes for freestyle andrew  renewel for medicare  Please fax to [phone]    Phone Number Patient can be reached at: Other phone number:  Kim [phone]    Best Time: anytime    Can we leave a detailed message on this number? YES    Call taken on 12/31/2021 at 10:32 AM by Renetta Ga

## 2022-01-04 ENCOUNTER — OFFICE VISIT (OUTPATIENT)
Dept: FAMILY MEDICINE | Facility: CLINIC | Age: 77
End: 2022-01-04
Payer: MEDICARE

## 2022-01-04 VITALS
SYSTOLIC BLOOD PRESSURE: 132 MMHG | BODY MASS INDEX: 33.93 KG/M2 | RESPIRATION RATE: 22 BRPM | HEART RATE: 103 BPM | WEIGHT: 270 LBS | DIASTOLIC BLOOD PRESSURE: 71 MMHG

## 2022-01-04 DIAGNOSIS — Z79.4 TYPE 2 DIABETES MELLITUS WITH CHRONIC KIDNEY DISEASE, WITH LONG-TERM CURRENT USE OF INSULIN, UNSPECIFIED CKD STAGE (H): Primary | ICD-10-CM

## 2022-01-04 DIAGNOSIS — E11.22 TYPE 2 DIABETES MELLITUS WITH CHRONIC KIDNEY DISEASE, WITH LONG-TERM CURRENT USE OF INSULIN, UNSPECIFIED CKD STAGE (H): Primary | ICD-10-CM

## 2022-01-04 DIAGNOSIS — E78.00 PURE HYPERCHOLESTEROLEMIA: ICD-10-CM

## 2022-01-04 DIAGNOSIS — I10 ESSENTIAL HYPERTENSION: ICD-10-CM

## 2022-01-04 LAB
ALT SERPL W P-5'-P-CCNC: 19 U/L (ref 0–45)
ANION GAP SERPL CALCULATED.3IONS-SCNC: 11 MMOL/L (ref 5–18)
BUN SERPL-MCNC: 20 MG/DL (ref 8–28)
CALCIUM SERPL-MCNC: 9.2 MG/DL (ref 8.5–10.5)
CHLORIDE BLD-SCNC: 103 MMOL/L (ref 98–107)
CO2 SERPL-SCNC: 23 MMOL/L (ref 22–31)
CREAT SERPL-MCNC: 1.48 MG/DL (ref 0.7–1.3)
GFR SERPL CREATININE-BSD FRML MDRD: 49 ML/MIN/1.73M2
GLUCOSE BLD-MCNC: 207 MG/DL (ref 70–125)
HBA1C MFR BLD: 7.4 % (ref 0–5.6)
POTASSIUM BLD-SCNC: 4.4 MMOL/L (ref 3.5–5)
SODIUM SERPL-SCNC: 137 MMOL/L (ref 136–145)

## 2022-01-04 PROCEDURE — 83036 HEMOGLOBIN GLYCOSYLATED A1C: CPT | Performed by: PHYSICIAN ASSISTANT

## 2022-01-04 PROCEDURE — 80048 BASIC METABOLIC PNL TOTAL CA: CPT | Performed by: PHYSICIAN ASSISTANT

## 2022-01-04 PROCEDURE — 36415 COLL VENOUS BLD VENIPUNCTURE: CPT | Performed by: PHYSICIAN ASSISTANT

## 2022-01-04 PROCEDURE — 99214 OFFICE O/P EST MOD 30 MIN: CPT | Performed by: PHYSICIAN ASSISTANT

## 2022-01-04 PROCEDURE — 84460 ALANINE AMINO (ALT) (SGPT): CPT | Performed by: PHYSICIAN ASSISTANT

## 2022-01-04 RX ORDER — PEN NEEDLE, DIABETIC 32GX 5/32"
NEEDLE, DISPOSABLE MISCELLANEOUS
Qty: 300 EACH | Refills: 2 | Status: CANCELLED | OUTPATIENT
Start: 2022-01-04

## 2022-01-04 RX ORDER — ATORVASTATIN CALCIUM 40 MG/1
TABLET, FILM COATED ORAL
Qty: 90 TABLET | Refills: 3 | Status: CANCELLED | OUTPATIENT
Start: 2022-01-04

## 2022-01-04 RX ORDER — DOCUSATE SODIUM 100 MG/1
100 CAPSULE, LIQUID FILLED ORAL 2 TIMES DAILY
Status: CANCELLED | OUTPATIENT
Start: 2022-01-04

## 2022-01-04 RX ORDER — ACETAMINOPHEN 500 MG
1000 TABLET ORAL EVERY 6 HOURS
Status: CANCELLED | OUTPATIENT
Start: 2022-01-04

## 2022-01-04 RX ORDER — LANCETS
EACH MISCELLANEOUS
Qty: 100 EACH | Refills: 3 | Status: CANCELLED | OUTPATIENT
Start: 2022-01-04

## 2022-01-04 RX ORDER — INSULIN DEGLUDEC 100 U/ML
INJECTION, SOLUTION SUBCUTANEOUS
Qty: 15 ML | Refills: 3 | Status: CANCELLED | OUTPATIENT
Start: 2022-01-04

## 2022-01-04 RX ORDER — INSULIN ASPART 100 [IU]/ML
INJECTION, SOLUTION INTRAVENOUS; SUBCUTANEOUS
Qty: 15 ML | Refills: 3 | Status: CANCELLED | OUTPATIENT
Start: 2022-01-04

## 2022-01-04 RX ORDER — ADHESIVE BANDAGE 3/4"
BANDAGE TOPICAL
Qty: 1 EACH | Refills: 0 | Status: CANCELLED | OUTPATIENT
Start: 2022-01-04

## 2022-01-04 RX ORDER — PIOGLITAZONEHYDROCHLORIDE 30 MG/1
30 TABLET ORAL DAILY
Qty: 90 TABLET | Refills: 3 | Status: CANCELLED | OUTPATIENT
Start: 2022-01-04

## 2022-01-04 NOTE — PROGRESS NOTES
"  Subjective:    Goyo Cole is a 76 year old male who presents with chief complaint of diabetes check.    He is frustrated with the discrepancies between his freestyle andrew meter readings and his fingersticks.  We discussed that these are measuring blood sugars in 2 slightly different ways, so will be normal to have a little bit of variance.  I reviewed that he should pick 1 method and essentially stick with it.  He often gives himself mealtime insulin even when his sugars are normal, then his sugars get low when he has to have something sweet to bring his sugars back up.    Patient Active Problem List   Diagnosis     Fatigue     Obesity     Pure hypercholesterolemia     Essential Hypertension     Renal Insufficiency     Proteinuria     Male Erectile Disorder     Esophageal Reflux     Degenerative joint disease (DJD) of hip     ELIOT on CPAP     Mass of left testicle     Chewing tobacco use     Benign prostatic hyperplasia with lower urinary tract symptoms     Left Spermatocele     Class 1 obesity due to excess calories with serious comorbidity and body mass index (BMI) of 32.0 to 32.9 in adult     Type 2 diabetes mellitus with kidney complication, with long-term current use of insulin (H)     Renal insufficiency syndrome       Current Outpatient Medications:      acetaminophen (TYLENOL) 500 MG tablet, [ACETAMINOPHEN (TYLENOL) 500 MG TABLET] Take 1,000 mg by mouth every 6 (six) hours., Disp: , Rfl:      ASCORBIC ACID ORAL, [ASCORBIC ACID ORAL] Take 1 tablet by mouth daily., Disp: , Rfl:      aspirin 81 MG EC tablet, [ASPIRIN 81 MG EC TABLET] Take 81 mg by mouth daily., Disp: , Rfl:      atorvastatin (LIPITOR) 40 MG tablet, [ATORVASTATIN (LIPITOR) 40 MG TABLET] TAKE 1 TABLET(40 MG) BY MOUTH AT BEDTIME, Disp: 90 tablet, Rfl: 3     BD EMMETT U/F 32G X 4 MM insulin pen needle, [BD ULTRA-FINE EMMETT PEN NEEDLE 32 GAUGE X 5/32\" NDLE] USE AS DIRECTED, Disp: 300 each, Rfl: 2     cholecalciferol, vitamin D3, (CHOLECALCIFEROL) " 1,000 unit tablet, [CHOLECALCIFEROL, VITAMIN D3, (CHOLECALCIFEROL) 1,000 UNIT TABLET] Take 1,000 Units by mouth daily., Disp: , Rfl:      Continuous Blood Gluc Sensor (FREESTYLE ESTRADA 14 DAY SENSOR) [facility] [place] 1 each every 14 days, Disp: 6 each, Rfl: 11     CONTOUR NEXT TEST STRIPS strips, [CONTOUR NEXT TEST STRIPS STRIPS] CHECK BLOOD SUGAR BEFORE MEALS AND AT BEDTIME, Disp: 120 strip, Rfl: 10     docusate sodium (COLACE) 100 MG capsule, [DOCUSATE SODIUM (COLACE) 100 MG CAPSULE] Take 100 mg by mouth 2 (two) times a day., Disp: , Rfl:      flash glucose scanning reader (FREESTYLE ESTRADA 14 DAY READER) Misc, [FLASH GLUCOSE SCANNING READER (FREESTYLE ESTRADA 14 DAY READER) MISC] Use 1 application As Directed every 14 (fourteen) days. Test every 8 hours Per  instructions., Disp: 2 each, Rfl: 5     insulin degludec (TRESIBA FLEXTOUCH) 100 UNIT/ML pen, [TRESIBA FLEXTOUCH U-100 100 UNIT/ML (3 ML) INJECTION PEN] INJECT 24 UNITS UNDER THE SKIN AT BEDTIME, Disp: 15 mL, Rfl: 3     metFORMIN (GLUCOPHAGE) 500 MG tablet, [METFORMIN (GLUCOPHAGE) 500 MG TABLET] TAKE 2 TABLETS(1000 MG) BY MOUTH TWICE DAILY WITH MEALS, Disp: 360 tablet, Rfl: 3     MICROLET LANCET, [MICROLET LANCET] USE TO CHECK BLOOD SUGAR QID as needed, Disp: 100 each, Rfl: 3     miscellaneous medical supply (BLOOD PRESSURE CUFF) [place], [MISCELLANEOUS MEDICAL SUPPLY (BLOOD PRESSURE CUFF) MISC] Please provide patient with automatic blood pressure cuff.  Diagnosis: hypertension, Disp: 1 each, Rfl: 0     NOVOLOG FLEXPEN 100 UNIT/ML soln, INJECT 7 UNITS AT BREAKFAST, 8 UNITS AT LUNCH, AND 10 UNITS AT DINNER, Disp: 15 mL, Rfl: 3     pioglitazone (ACTOS) 30 MG tablet, Take 1 tablet (30 mg) by mouth daily, Disp: 90 tablet, Rfl: 3      Objective:   Allergies:  Patient has no known allergies.    Vitals:  Vitals:    01/04/22 1325   BP: 132/71   BP Location: Right arm   Patient Position: Sitting   Cuff Size: Adult Large   Pulse: 103   Resp: 22   Weight: 122.5 kg (270 lb)        Body mass index is 33.93 kg/m .    Vital signs reviewed.  General: Patient is alert and oriented x 3, in no apparent distress  Cardiac: regular rate and rhythm, no murmurs  Pulmonary: lungs clear to auscultation bilaterally, no crackles, rales, rhonchi, or wheezing noted    Results for orders placed or performed in visit on 01/04/22   Hemoglobin A1c     Status: Abnormal   Result Value Ref Range    Hemoglobin A1C 7.4 (H) 0.0 - 5.6 %         Assessment and Plan:   1. Type 2 diabetes mellitus with chronic kidney disease, with long-term current use of insulin, unspecified CKD stage (H)  A1c: 7.4%, increased from   % in July 2021  Eye Exam: UTD  Foot Exam: UTD  Smoking: no, chews tobacco  On a statin: yes  Blood Pressure: controlled  Microalbumin: UTD  On ACE inhibitor: no, consider based on today's lab results  I think he would benefit from closer evaluation of his blood sugars.  Sometimes might be over-treating with insulin, then having sugar to make up for lower sugars.  Using FreeStyle Rowan meter.  Referral to [facility] Pharmacist to review further.  Keeps meds the same for now.  Follow-up in 4-6 months.  - Med Therapy Management Referral  - Hemoglobin A1c  - Basic metabolic panel    2. Essential hypertension  Blood pressure at goal.  Screening labs ordered today and I will follow up with results.  - Basic metabolic panel    3. Pure hypercholesterolemia  Continue with statin.  Screening labs ordered today.  He is not fasting.  - Hemoglobin A1c  - Basic metabolic panel  - ALT; Future  - ALT    4.  Healthcare maintenance  He has quit smoking over 15 years ago.  Would not qualify for low-dose chest CT lung cancer screening.  Does chew tobacco.      This dictation uses voice recognition software, which may contain typographical errors.

## 2022-01-06 DIAGNOSIS — E11.9 TYPE 2 DIABETES MELLITUS WITHOUT COMPLICATION, WITH LONG-TERM CURRENT USE OF INSULIN (H): ICD-10-CM

## 2022-01-06 DIAGNOSIS — Z79.4 TYPE 2 DIABETES MELLITUS WITHOUT COMPLICATION, WITH LONG-TERM CURRENT USE OF INSULIN (H): ICD-10-CM

## 2022-01-06 RX ORDER — FLASH GLUCOSE SENSOR
1 KIT MISCELLANEOUS
Qty: 6 EACH | Refills: 11 | Status: SHIPPED | OUTPATIENT
Start: 2022-01-06 | End: 2022-07-17

## 2022-01-06 NOTE — TELEPHONE ENCOUNTER
"Requesting new order for Medicare B compliance.    Please send new Rx for Freestyle Rowan 14 Day Sensors     Freestyle Rowan 14 Day Sensors  Qty: 2  Refills: can have up to 5 additional refills  Sig \"CHANGE EVERY 14 DAYS\"    Please call [phone] and speak to one of our Durable Medical Equipment Team members if you have any questions.    "

## 2022-01-10 ENCOUNTER — MYC MEDICAL ADVICE (OUTPATIENT)
Dept: FAMILY MEDICINE | Facility: CLINIC | Age: 77
End: 2022-01-10
Payer: MEDICARE

## 2022-01-19 NOTE — PROGRESS NOTES
Medication Therapy Management (MTM) Encounter    ASSESSMENT:                            Medication Adherence/Access: Some anxiety related to medications. Would benefit from continued medication education. Specific eduction from today as below.       Type 2 Diabetes: Last A1C at goal <7%. At goal of being in target range at least 70% of the time. Discussed that higher doses Vitamin C and affect CGM readings. Will have pt discontinue supplement. Pt worried about having lows. Also worried about microvascular outcomes. Reviewed that microvascular outcomes are due to poor blood glucose control and not medication side effects. Given that pt is worried about hypoglycemia and accuracy of CGM readings, discussed switching from bolus insulin to GLP1. Given recent stents, would be a better option for the weight loss and CVD benefits. Also overall a lower risk of causing low blood sugars. Discussed the possibility that he may initially have more frequent lows depending on how his body responds to the medication. Provided instructions for adjusting basal insulin if lows should occur. Briefly discussed diet. Has worked with CDE in the past - will refer to connect again.        CAD: Last LDL at goal <70. BP at goal <130/80. Pulse at goal . Appropriately using high intensity statin and DAPT. Reviewed that it's okay if he's not getting exactly half tab of Lopressor as pulse trends in target range. Okay if Brillinta is not exactly 12 hours apart - encouraged pt to simplify and just take medicines twice daily with meals.       Low Vitamin D: No levels on file. Will plan for recheck with next set of labs to determine appropriate dose.        Supplements: Discussed most of the supplements may not be necessary if they focus on eating a balanced diet, but likely not harmful. Given interaction with Vitamin C and CGM, encouraged stopping. Unclear indication for alfalfa complex and lecithin, but likely safe to continue. Given statin  use with lipids at goal, likely does not need fish oil. Encouraged pt to eat more natural greens and skip the supplement.       PLAN:                            1. Stop Novolog. Start Ozempic 0.25 mg weekly for 4 weeks,     2. When you start the ozempic, if you start to have more frequent low blood sugars, reduce your tresiba by 2 units. A week later, if you continue to have lows, decrease another 2 units.     3. Okay to take Brillinta with breakfast and dinner even if that's not exactly 12 hours apart.     4. Stop Vitamin C supplement, Fish Oil/Omega Guard, and Organic Greens.       Follow-up: Return in about 4 weeks (around 2/18/2022) for Medication Management Pharmacist, in person.    SUBJECTIVE/OBJECTIVE:                          Goyo Cole is a 76 year old male coming in for an initial visit. He was referred to me from Magalis Tucker PA-C. Patient was accompanied by wife... Since referral, patient was discharged from [facility] on 1/8/2022 for CAD, chest pain.      Reason for visit: Referred for diabetes management. Had some issues with it not registering correctly.     Allergies/ADRs: Reviewed in chart  Past Medical History: Reviewed in chart  Tobacco: He reports that he has quit smoking. His smokeless tobacco use includes chew. Still using chewing tobacco - working on reducing.   Alcohol: Last drink was at [employer]. Sometimes, he'll go out and have a lot to drink.       Medication Adherence/Access: Brings medication list and print out of supplements.   Pt worries about overnight lows. Also worries about what happens if he doesn't follow medication instructions exactly as written e.g. not a perfect half of Lopressor or Brillinta not exactly 12 hours apart.        Type 2 Diabetes: Prescribed Tresiba 22 units at bedtime, Metformin 500 mg 2 tabs twice daily, Novolog 7-8-10 units [id number], Pioglitazone 30 mg daily. Patient is not experiencing side effects.  Blood sugar monitoring:  Continuous Glucose Monitor. Ranges (patient reported):     Notes large variation with CGM and finger pokes.   At lunch was 186 on CGM and finger stick was 188.             Symptoms of low blood sugar? Does sometimes feel dizzy and weak   Symptoms of high blood sugar? none    Diet/Exercise: Tends to overeat sweets.     Hemoglobin A1C   Date Value Ref Range Status   01/04/2022 7.4 (H) 0.0 - 5.6 % Final     Comment:     Normal <5.7%   Prediabetes 5.7-6.4%    Diabetes 6.5% or higher     Note: Adopted from ADA consensus guidelines.   07/09/2021 6.7 (H) <=5.6 % Final   04/12/2021 6.6 (H) <=5.6 % Final      Microalbumin Urine mg/dL   Date Value Ref Range Status   01/21/2021 1.79 0.00 - 1.99 mg/dL Final      No results found for: UCRR  LDL Cholesterol Calculated   Date Value Ref Range Status   04/12/2021 46 <=129 mg/dL Final     LDL Cholesterol Direct   Date Value Ref Range Status   02/13/2020 42 <=129 mg/dl Final     Creatinine   Date Value Ref Range Status   01/04/2022 1.48 (H) 0.70 - 1.30 mg/dL Final     GFR Estimate   Date Value Ref Range Status   01/04/2022 49 (L) >60 mL/min/1.73m2 Final     Comment:     Effective December 21, 2021 eGFRcr in adults is calculated using the 2021 CKD-EPI creatinine equation which includes age and gender (Dharmesh grey al., [place], DOI: 10.1056/ANAXdn5490927)   07/09/2021 47 (L) >60 mL/min/1.73m2 Final     GFR Estimate If Black   Date Value Ref Range Status   07/09/2021 57 (L) >60 mL/min/1.73m2 Final          CAD: Admitted to [facility] on 1/5 with chest pain. 2 stents to LAD. Started on Metoprolol Tartrate 25 mg 0.5 tabs twice daily, and Brillinta 90 mg twice daily. Continues Aspirin 81 mg daily, Atorvastatin 40 mg daily,     Taking Brillinta every 12 hours wonders if it has to be with food and what if it's not every 12 hours.     1/6 Echo at [facility] - EF 60%.       Recent Labs   Lab Test 04/12/21  0740 02/13/20  1133 06/01/18  1613 02/06/18  0818   CHOL 95  --   --  105   HDL 33*  --   --  35*    LDL 46 42   < > 49   TRIG 82  --   --  103    < > = values in this interval not displayed.       BP Readings from Last 3 Encounters:   01/21/22 124/68   01/04/22 132/71   07/09/21 126/62      Pulse Readings from Last 3 Encounters:   01/21/22 70   01/04/22 103   07/09/21 92       Low Vitamin D: Prescribed Vitamin D3 1000 units daily .     Vitamin D Deficiency Screening Results:  No results found for: VITDT       Supplements: Using Vitamin C 500 mg daily + Sustained Vitamin C 1 daily,  MVI,    COQ-10   Alfalfa complex -3 tablets   Lecithin 1 capsule   Omega guard fish oils-  1 per day   Organic greens (kale spinach, broccoli) - hasn't started yet     Magnesium 200 mg + 99 mg k+,   Vitamin D3 2000 units daily   MVI GOLD -   Vivix Liquigels - Rejvetrol - Grape extract with resveratrol,         Today's Vitals: /68   Pulse 70   ----------------  Post Discharge Medication Reconciliation Status: discharge medications reconciled and changed, per note/orders.    I spent 75 minutes with this patient today. All changes were made via collaborative practice agreement with Magalis Tucker PA-C. A copy of the visit note was provided to the patient's provider(s).    The patient was given a summary of these recommendations.     Jake Ortega, JuanpabloD  Medication Therapy Management (MTM) Pharmacist  [facility]       Medication Therapy Recommendations  Coronary artery disease involving autologous artery coronary bypass graft without angina pectoris    Current Medication: ticagrelor (BRILINTA) 90 MG tablet   Rationale: Does not understand instructions - Adherence - Adherence   Recommendation: Provide Education   Status: Accepted per CPA         Takes dietary supplements    Rationale: No medical indication at this time - Unnecessary medication therapy - Indication   Recommendation: Discontinue Medication   Status: Accepted per CPA   Note: Stop Vit C, Omega guard, Organic Greens suppleemnts         Type  2 diabetes mellitus with kidney complication, with long-term current use of insulin (H)    Current Medication: NOVOLOG FLEXPEN 100 UNIT/ML soln (Discontinued)   Rationale: More effective medication available - Ineffective medication - Effectiveness   Recommendation: Change Medication - Ozempic (0.25 or 0.5 MG/DOSE) 2 MG/1.5ML Sopn   Status: Accepted per CPA

## 2022-01-20 NOTE — TELEPHONE ENCOUNTER
I downloaded discharge summary from Care Everywhere. Pt scheduled to see me tomorrow. Will update med list etc when they come in.

## 2022-01-21 ENCOUNTER — OFFICE VISIT (OUTPATIENT)
Dept: PHARMACY | Facility: CLINIC | Age: 77
End: 2022-01-21
Attending: PHYSICIAN ASSISTANT
Payer: COMMERCIAL

## 2022-01-21 VITALS — HEART RATE: 70 BPM | SYSTOLIC BLOOD PRESSURE: 124 MMHG | DIASTOLIC BLOOD PRESSURE: 68 MMHG

## 2022-01-21 DIAGNOSIS — E11.22 TYPE 2 DIABETES MELLITUS WITH CHRONIC KIDNEY DISEASE, WITH LONG-TERM CURRENT USE OF INSULIN, UNSPECIFIED CKD STAGE (H): Primary | ICD-10-CM

## 2022-01-21 DIAGNOSIS — I25.810 CORONARY ARTERY DISEASE INVOLVING AUTOLOGOUS ARTERY CORONARY BYPASS GRAFT WITHOUT ANGINA PECTORIS: ICD-10-CM

## 2022-01-21 DIAGNOSIS — Z78.9 TAKES DIETARY SUPPLEMENTS: ICD-10-CM

## 2022-01-21 DIAGNOSIS — E55.9 VITAMIN D DEFICIENCY: ICD-10-CM

## 2022-01-21 DIAGNOSIS — Z79.4 TYPE 2 DIABETES MELLITUS WITH CHRONIC KIDNEY DISEASE, WITH LONG-TERM CURRENT USE OF INSULIN, UNSPECIFIED CKD STAGE (H): Primary | ICD-10-CM

## 2022-01-21 PROCEDURE — 99607 MTMS BY PHARM ADDL 15 MIN: CPT | Performed by: PHARMACIST

## 2022-01-21 PROCEDURE — 99605 MTMS BY PHARM NP 15 MIN: CPT | Performed by: PHARMACIST

## 2022-01-21 RX ORDER — METOPROLOL TARTRATE 25 MG/1
12.5 TABLET, FILM COATED ORAL
COMMUNITY
Start: 2022-01-08 | End: 2023-04-07

## 2022-01-21 RX ORDER — SEMAGLUTIDE 1.34 MG/ML
0.25 INJECTION, SOLUTION SUBCUTANEOUS WEEKLY
Qty: 1.5 ML | Refills: 3 | Status: SHIPPED | OUTPATIENT
Start: 2022-01-21 | End: 2022-02-18

## 2022-01-21 NOTE — PATIENT INSTRUCTIONS
Recommendations from today's MTM visit:                                                       1. Stop Novolog. Start Ozempic 0.25 mg weekly for 4 weeks,     2. When you start the ozempic, if you start to have more frequent low blood sugars, reduce your tresiba by 2 units. A week later, if you continue to have lows, decrease another 2 units.     3. Okay to take Brillinta with breakfast and dinner even if that's not exactly 12 hours apart.     4. I recommend stopping Vitamin C supplement, Fish Oil/Omega Guard, and Organic Greens.       Follow-up: Return in about 4 weeks (around 2/18/2022) for Medication Management Pharmacist, in person.    It was great to speak with you today.  I value your experience and would be very thankful for your time with providing feedback on our clinic survey. You may receive a survey via email or text message in the next few days.     To schedule another MTM appointment, please call the clinic directly or you may call the MTM scheduling line at [phone] or toll-free at [phone].     My Clinical Pharmacist's contact information:                                                      Please feel free to contact me with any questions or concerns you have.      Jake Ortega, PharmD  Medication Therapy Management (MTM) Pharmacist  [facility]

## 2022-02-08 ENCOUNTER — ALLIED HEALTH/NURSE VISIT (OUTPATIENT)
Dept: EDUCATION SERVICES | Facility: CLINIC | Age: 77
End: 2022-02-08
Payer: MEDICARE

## 2022-02-08 DIAGNOSIS — Z79.4 TYPE 2 DIABETES MELLITUS WITH CHRONIC KIDNEY DISEASE, WITH LONG-TERM CURRENT USE OF INSULIN, UNSPECIFIED CKD STAGE (H): ICD-10-CM

## 2022-02-08 DIAGNOSIS — E11.22 TYPE 2 DIABETES MELLITUS WITH CHRONIC KIDNEY DISEASE, WITH LONG-TERM CURRENT USE OF INSULIN, UNSPECIFIED CKD STAGE (H): ICD-10-CM

## 2022-02-08 PROCEDURE — G0108 DIAB MANAGE TRN  PER INDIV: HCPCS | Performed by: DIETITIAN, REGISTERED

## 2022-02-08 NOTE — LETTER
"    2/8/2022         RE: Goyo Cole  [address]  [place] [address]        Dear Colleague,    Thank you for referring your patient, Goyo Cole, to the [facility]. Please see a copy of my visit note below.    Diabetes Self-Management Education & Support    Presents for: Follow-up    SUBJECTIVE/OBJECTIVE:  Presents for: Follow-up  Accompanied by: Self,Spouse (spouse, Cate accompanied Goyo for today's visit)  Diabetes education in the past 24mo: No (last visit was 9/2021)  Focus of Visit: Monitoring,Reducing Risks,Taking Medication,Healthy Eating,Being Active  Diabetes type: Type 2  Disease course: (P) Stable  Transportation concerns: No  Other concerns:: None  Cultural Influences/Ethnic Background:  Not  or       Diabetes Symptoms & Complications:  Fatigue: Yes (patient has recent stent placement and has started on new medications, so he is unsure of what is causing the fatigue)  Neuropathy: No  Polydipsia: No  Polyphagia: No  Polyuria: No  Visual change: No  Slow healing wounds: No  Autonomic neuropathy: (P) No  CVA: (P) No  Heart disease: (P) Yes  Nephropathy: (P) Yes  Peripheral neuropathy: (P) No  Peripheral Vascular Disease: (P) No  Retinopathy: (P) No  Sexual dysfunction: (P) Yes    Patient Problem List and Family Medical History reviewed for relevant medical history, current medical status, and diabetes risk factors.    Vitals:  There were no vitals taken for this visit.  Estimated body mass index is 33.93 kg/m  as calculated from the following:    Height as of 1/21/21: 1.9 m (6' 2.8\").    Weight as of 1/4/22: 122.5 kg (270 lb).   Last 3 BP:   BP Readings from Last 3 Encounters:   01/21/22 124/68   01/04/22 132/71   07/09/21 126/62       History   Smoking Status     Former Smoker   Smokeless Tobacco     Current User     Types: Chew     Last attempt to quit: 1/1/2016       Labs:  Lab Results   Component Value Date    A1C 7.4 01/04/2022     Lab Results "   Component Value Date     01/04/2022     Lab Results   Component Value Date    LDL 46 04/12/2021    LDL 42 02/13/2020     Direct Measure HDL   Date Value Ref Range Status   04/12/2021 33 (L) >=40 mg/dL Final   ]  GFR Estimate   Date Value Ref Range Status   01/04/2022 49 (L) >60 mL/min/1.73m2 Final     Comment:     Effective December 21, 2021 eGFRcr in adults is calculated using the 2021 CKD-EPI creatinine equation which includes age and gender (Dharmesh et al., [place], DOI: 10.[facility]6/[id number])   07/09/2021 47 (L) >60 mL/min/1.73m2 Final     GFR Estimate If Black   Date Value Ref Range Status   07/09/2021 57 (L) >60 mL/min/1.73m2 Final     Lab Results   Component Value Date    CR 1.48 01/04/2022     No results found for: MICROALBUMIN    Healthy Eating:  Healthy Eating Assessed Today: Yes  Cultural/Muslim diet restrictions?: (P) No  Meal planning/habits: Carb counting (spouse, Cate has been reading labels and limiting CHO and sodium at meals)  How many times a week on average do you eat food made away from home (restaurant/take-out)?: (P) 0  Meals include: Breakfast,Lunch,Dinner  Breakfast: two packets original instant oatmeal, 8 oz milk, 60% unsweetened dark chocolate square, sliver of Divehi butter, Belvita Blueberry biscuit, honey, banana, coffee  Lunch: healthy choice bowl, water  Dinner: vegetables or salad, protein, small amount of brown rice or other CHO source  Snacks: Hs: yogurt, or pistachios or oreo cookies  Beverages: Water,Coffee  Has patient met with a dietitian in the past?: Yes    Being Active:  Being Active Assessed Today: Yes  Exercise:: Yes (patient s participating in cardiac rehab 2-3x/week)  Days per week of moderate to strenuous exercise (like a brisk walk): (P) 1  On average, minutes per day of exercise at this level: (P) 40  How intense was your typical exercise? : (P) Light (like stretching or slow walking)  Exercise Minutes per Week: (P) 40  Barrier to exercise:  (patient is  following guidelines for exercise from cardiac rehab)    Monitoring:  Monitoring Assessed Today: Yes  Blood Glucose Meter: CGM  Times checking blood sugar at home (number): 3  Times checking blood sugar at home (per): Day  Blood glucose trend: (P) Fluctuating            Taking Medications:  Diabetes Medication(s)     Biguanides       metFORMIN (GLUCOPHAGE) 500 MG tablet    [METFORMIN (GLUCOPHAGE) 500 MG TABLET] TAKE 2 TABLETS(1000 MG) BY MOUTH TWICE DAILY WITH MEALS    Insulin       insulin degludec (TRESIBA FLEXTOUCH) 100 UNIT/ML pen    [TRESIBA FLEXTOUCH U-100 100 UNIT/ML (3 ML) INJECTION PEN] INJECT 24 UNITS UNDER THE SKIN AT BEDTIME    Insulin Sensitizing Agents       pioglitazone (ACTOS) 30 MG tablet    Take 1 tablet (30 mg) by mouth daily    Incretin Mimetic Agents (GLP-1 Receptor Agonists)       semaglutide (OZEMPIC, 0.25 OR 0.5 MG/DOSE,) 2 MG/1.5ML SOPN pen    Inject 0.25 mg Subcutaneous once a week for 28 days          Taking Medication Assessed Today: Yes  Current Treatments: Diet,Non-insulin Injectables,Oral Medication (taken by mouth),Insulin Injections  Problems taking diabetes medications regularly?: Yes  Diabetes medication side effects?:  (patient noticed a little bit of GI upset a few days after starting his GLP1 medication)    Problem Solving:  Problem Solving Assessed Today: Yes  Is the patient at risk for hypoglycemia?: Yes  Hypoglycemia Frequency: Monthly (patient reported no hypoglycemic BG in the past 2 weeks)    Hypoglycemia symptoms  Dizziness or Light-Headedness: Yes         Reducing Risks:  Diabetes Risks: Age over 45 years  CAD Risks: (P) Diabetes Mellitus,Dyslipidemia,Hypertension,Male sex,Obesity,Sedentary lifestyle,Tobacco exposure  Has dilated eye exam at least once a year?: Yes  Sees dentist every 6 months?: Yes  Feet checked by healthcare provider in the last year?: (P) Yes    Healthy Coping:  Emotional response to diabetes: Ready to learn  Informal Support system:: Spouse  Stage of  change: PREPARATION (Decided to change - considering how)  Patient Activation Measure Survey Score:  No flowsheet data found.    Diabetes knowledge and skills assessment:   Patient is knowledgeable in diabetes management concepts related to: Taking Medication    Patient needs further education on the following diabetes management concepts: Healthy Eating, Being Active, Monitoring, Problem Solving and Reducing Risks    Based on learning assessment above, most appropriate setting for further diabetes education would be: Group class or Individual setting.      INTERVENTIONS:    Education provided today on:  JOSE Self-Care Behaviors:  Healthy Eating: carbohydrate counting, weight reduction, heart healthy diet, portion control, plate planning method and label reading  Being Active: relationship to blood glucose and describe appropriate activity program  Monitoring: encouraged increasing scanning of his sensor  Taking Medication: action of prescribed medication  Problem Solving: high blood glucose - causes, signs/symptoms, treatment and prevention and low blood glucose - causes, signs/symptoms, treatment and prevention  Reducing Risks: foot care, appropriate dental care, annual eye exam, smoking cessation, A1C - goals, relating to blood glucose levels, how often to check, lipids levels and goals and blood pressure and goals    Opportunities for ongoing education and support in diabetes-self management were discussed.    Pt verbalized understanding of concepts discussed and recommendations provided today.       Education Materials Provided:  Living Healthy with Diabetes, Carbohydrate Counting, Hypoglycemia Signs and Symptoms and My Plate Planner      ASSESSMENT:  Follow up visit for Damien, last visit with Diabetes Ed was 9/2021.  Damien had a recent stent(2) placement in January 2022.  He is doing cardiac rehab 2-3x/week.  His spouse Cate has been reading labels and has been looking to reduce sodium and carbohydrates for his  meals. He is consuming three meals/dy, plus snacks. He is hydrating with water. Patient reports that he has not had any ETOH since Emily.  He is using chewing tobacco, one tin over one weeks time. He is working towards being tobacco free.         Patient's most recent   Lab Results   Component Value Date    A1C 7.4 01/04/2022    is not meeting goal of <7.0    PLAN  1. Scan sensor when you wake up, at your meals and right before you to to bed.  2. Decrease the amount of butter on your oatmeal by 50%.  3. Do not have the Belvita biscuit with your oatmeal.  4. Save your banana for a mid morning snack, and do not have it with your morning oatmeal.  5. Have a minimum of 4 water bottles/day.  6. Follow up visit with MTM on 2/11/22.     See Patient Instructions for co-developed, patient-stated behavior change goals.  AVS printed and provided to patient today. See Follow-Up section for recommended follow-up.      Time Spent: 90 minutes  Encounter Type: Individual    Any diabetes medication dose changes were made via the CDE Protocol and Collaborative Practice Agreement with the patient's primary care provider. A copy of this encounter was shared with the provider.

## 2022-02-09 NOTE — PROGRESS NOTES
"Diabetes Self-Management Education & Support    Presents for: Follow-up    SUBJECTIVE/OBJECTIVE:  Presents for: Follow-up  Accompanied by: Self,Spouse (spouse, Cate accompanied Mary for today's visit)  Diabetes education in the past 24mo: No (last visit was 9/2021)  Focus of Visit: Monitoring,Reducing Risks,Taking Medication,Healthy Eating,Being Active  Diabetes type: Type 2  Disease course: (P) Stable  Transportation concerns: No  Other concerns:: None  Cultural Influences/Ethnic Background:  Not  or       Diabetes Symptoms & Complications:  Fatigue: Yes (patient has recent stent placement and has started on new medications, so he is unsure of what is causing the fatigue)  Neuropathy: No  Polydipsia: No  Polyphagia: No  Polyuria: No  Visual change: No  Slow healing wounds: No  Autonomic neuropathy: (P) No  CVA: (P) No  Heart disease: (P) Yes  Nephropathy: (P) Yes  Peripheral neuropathy: (P) No  Peripheral Vascular Disease: (P) No  Retinopathy: (P) No  Sexual dysfunction: (P) Yes    Patient Problem List and Family Medical History reviewed for relevant medical history, current medical status, and diabetes risk factors.    Vitals:  There were no vitals taken for this visit.  Estimated body mass index is 33.93 kg/m  as calculated from the following:    Height as of 1/21/21: 1.9 m (6' 2.8\").    Weight as of 1/4/22: 122.5 kg (270 lb).   Last 3 BP:   BP Readings from Last 3 Encounters:   01/21/22 124/68   01/04/22 132/71   07/09/21 126/62       History   Smoking Status     Former Smoker   Smokeless Tobacco     Current User     Types: Chew     Last attempt to quit: 1/1/2016       Labs:  Lab Results   Component Value Date    A1C 7.4 01/04/2022     Lab Results   Component Value Date     01/04/2022     Lab Results   Component Value Date    LDL 46 04/12/2021    LDL 42 02/13/2020     Direct Measure HDL   Date Value Ref Range Status   04/12/2021 33 (L) >=40 mg/dL Final   ]  GFR Estimate   Date Value Ref " Range Status   01/04/2022 49 (L) >60 mL/min/1.73m2 Final     Comment:     Effective December 21, 2021 eGFRcr in adults is calculated using the 2021 CKD-EPI creatinine equation which includes age and gender (Dharmesh grey al., [place], DOI: 10.1056/OVURin2959529)   07/09/2021 47 (L) >60 mL/min/1.73m2 Final     GFR Estimate If Black   Date Value Ref Range Status   07/09/2021 57 (L) >60 mL/min/1.73m2 Final     Lab Results   Component Value Date    CR 1.48 01/04/2022     No results found for: MICROALBUMIN    Healthy Eating:  Healthy Eating Assessed Today: Yes  Cultural/Restoration diet restrictions?: (P) No  Meal planning/habits: Carb counting (spouse, Cate has been reading labels and limiting CHO and sodium at meals)  How many times a week on average do you eat food made away from home (restaurant/take-out)?: (P) 0  Meals include: Breakfast,Lunch,Dinner  Breakfast: two packets original instant oatmeal, 8 oz milk, 60% unsweetened dark chocolate square, sliver of Austrian butter, Belvita Blueberry biscuit, honey, banana, coffee  Lunch: healthy choice bowl, water  Dinner: vegetables or salad, protein, small amount of brown rice or other CHO source  Snacks: Hs: yogurt, or pistachios or oreo cookies  Beverages: Water,Coffee  Has patient met with a dietitian in the past?: Yes    Being Active:  Being Active Assessed Today: Yes  Exercise:: Yes (patient s participating in cardiac rehab 2-3x/week)  Days per week of moderate to strenuous exercise (like a brisk walk): (P) 1  On average, minutes per day of exercise at this level: (P) 40  How intense was your typical exercise? : (P) Light (like stretching or slow walking)  Exercise Minutes per Week: (P) 40  Barrier to exercise:  (patient is following guidelines for exercise from cardiac rehab)    Monitoring:  Monitoring Assessed Today: Yes  Blood Glucose Meter: CGM  Times checking blood sugar at home (number): 3  Times checking blood sugar at home (per): Day  Blood glucose trend: (P)  Fluctuating            Taking Medications:  Diabetes Medication(s)     Biguanides       metFORMIN (GLUCOPHAGE) 500 MG tablet    [METFORMIN (GLUCOPHAGE) 500 MG TABLET] TAKE 2 TABLETS(1000 MG) BY MOUTH TWICE DAILY WITH MEALS    Insulin       insulin degludec (TRESIBA FLEXTOUCH) 100 UNIT/ML pen    [TRESIBA FLEXTOUCH U-100 100 UNIT/ML (3 ML) INJECTION PEN] INJECT 24 UNITS UNDER THE SKIN AT BEDTIME    Insulin Sensitizing Agents       pioglitazone (ACTOS) 30 MG tablet    Take 1 tablet (30 mg) by mouth daily    Incretin Mimetic Agents (GLP-1 Receptor Agonists)       semaglutide (OZEMPIC, 0.25 OR 0.5 MG/DOSE,) 2 MG/1.5ML SOPN pen    Inject 0.25 mg Subcutaneous once a week for 28 days          Taking Medication Assessed Today: Yes  Current Treatments: Diet,Non-insulin Injectables,Oral Medication (taken by mouth),Insulin Injections  Problems taking diabetes medications regularly?: Yes  Diabetes medication side effects?:  (patient noticed a little bit of GI upset a few days after starting his GLP1 medication)    Problem Solving:  Problem Solving Assessed Today: Yes  Is the patient at risk for hypoglycemia?: Yes  Hypoglycemia Frequency: Monthly (patient reported no hypoglycemic BG in the past 2 weeks)    Hypoglycemia symptoms  Dizziness or Light-Headedness: Yes         Reducing Risks:  Diabetes Risks: Age over 45 years  CAD Risks: (P) Diabetes Mellitus,Dyslipidemia,Hypertension,Male sex,Obesity,Sedentary lifestyle,Tobacco exposure  Has dilated eye exam at least once a year?: Yes  Sees dentist every 6 months?: Yes  Feet checked by healthcare provider in the last year?: (P) Yes    Healthy Coping:  Emotional response to diabetes: Ready to learn  Informal Support system:: Spouse  Stage of change: PREPARATION (Decided to change - considering how)  Patient Activation Measure Survey Score:  No flowsheet data found.    Diabetes knowledge and skills assessment:   Patient is knowledgeable in diabetes management concepts related to:  Taking Medication    Patient needs further education on the following diabetes management concepts: Healthy Eating, Being Active, Monitoring, Problem Solving and Reducing Risks    Based on learning assessment above, most appropriate setting for further diabetes education would be: Group class or Individual setting.      INTERVENTIONS:    Education provided today on:  JOSE Self-Care Behaviors:  Healthy Eating: carbohydrate counting, weight reduction, heart healthy diet, portion control, plate planning method and label reading  Being Active: relationship to blood glucose and describe appropriate activity program  Monitoring: encouraged increasing scanning of his sensor  Taking Medication: action of prescribed medication  Problem Solving: high blood glucose - causes, signs/symptoms, treatment and prevention and low blood glucose - causes, signs/symptoms, treatment and prevention  Reducing Risks: foot care, appropriate dental care, annual eye exam, smoking cessation, A1C - goals, relating to blood glucose levels, how often to check, lipids levels and goals and blood pressure and goals    Opportunities for ongoing education and support in diabetes-self management were discussed.    Pt verbalized understanding of concepts discussed and recommendations provided today.       Education Materials Provided:  Living Healthy with Diabetes, Carbohydrate Counting, Hypoglycemia Signs and Symptoms and My Plate Planner      ASSESSMENT:  Follow up visit for Damien, last visit with Diabetes Ed was 9/2021.  Damien had a recent stent(2) placement in January 2022.  He is doing cardiac rehab 2-3x/week.  His spouse Cate has been reading labels and has been looking to reduce sodium and carbohydrates for his meals. He is consuming three meals/dy, plus snacks. He is hydrating with water. Patient reports that he has not had any ETOH since Christmas.  He is using chewing tobacco, one tin over one weeks time. He is working towards being tobacco free.          Patient's most recent   Lab Results   Component Value Date    A1C 7.4 01/04/2022    is not meeting goal of <7.0    PLAN  1. Scan sensor when you wake up, at your meals and right before you to to bed.  2. Decrease the amount of butter on your oatmeal by 50%.  3. Do not have the Belvita biscuit with your oatmeal.  4. Save your banana for a mid morning snack, and do not have it with your morning oatmeal.  5. Have a minimum of 4 water bottles/day.  6. Follow up visit with MTM on 2/11/22.     See Patient Instructions for co-developed, patient-stated behavior change goals.  AVS printed and provided to patient today. See Follow-Up section for recommended follow-up.      Time Spent: 90 minutes  Encounter Type: Individual    Any diabetes medication dose changes were made via the CDE Protocol and Collaborative Practice Agreement with the patient's primary care provider. A copy of this encounter was shared with the provider.

## 2022-02-09 NOTE — PATIENT INSTRUCTIONS
1. Scan sensor when you wake up, at your meals and right before you to to bed.  2. Decrease the amount of butter on your oatmeal by 50%.  3. Do not have the Belvita biscuit with your oatmeal.  4. Save your banana for a mid morning snack, and do not have it with your morning oatmeal.  5. Have a minimum of 4 water bottles/day.  6. Follow up visit with MTM on 2/11/22.

## 2022-02-14 DIAGNOSIS — E78.00 PURE HYPERCHOLESTEROLEMIA: ICD-10-CM

## 2022-02-17 RX ORDER — ATORVASTATIN CALCIUM 40 MG/1
TABLET, FILM COATED ORAL
Qty: 90 TABLET | Refills: 0 | Status: SHIPPED | OUTPATIENT
Start: 2022-02-17 | End: 2022-06-01

## 2022-02-17 NOTE — PROGRESS NOTES
Medication Therapy Management (MTM) Encounter    ASSESSMENT:                            Medication Adherence/Access: Not addressed in detail today. See below.       CAD: Started on Brillinta and Aspirin in early January. Having blood in the stools, despite PPI. Concern for GI bleed. Pt was discussed with DOD and triaged for a same day appointment.       PLAN:                            1. Pt to see Dr. Lee today.     Follow-up: Return in about 3 weeks (around 3/11/2022) for Medication Management Pharmacist, in person.    SUBJECTIVE/OBJECTIVE:                          Goyo Cole is a 76 year old male coming in for a follow up visit. He was referred to me from Magalis Tucker PA-C. Patient was accompanied by wife.. Today's visit is a follow-up MTM visit from 1/21/2022.      Reason for visit: Referred for diabetes management. - Pt reports concerns of blood in the stools.     Allergies/ADRs: Reviewed in chart  Past Medical History: Reviewed in chart  Tobacco: He reports that he has quit smoking. His smokeless tobacco use includes chew. Still using chewing tobacco - working on reducing.   Alcohol: Last drink was at [employer]. Sometimes, he'll go out and have a lot to drink.       Medication Adherence/Access: Did not have med bottles at the visit.         CAD: Admitted to [facility] on 1/5/22 with chest pain. 2 stents to LAD. Prescribed Metoprolol Tartrate 25 mg 0.5 tabs twice daily, and Brillinta 90 mg twice daily, Aspirin 81 mg daily, Atorvastatin 40 mg daily. At last visit with Cardiology ([facility]) was started on Lisinopril 5 mg daily.     Taking Brillinta every 12 hours wonders if it has to be with food and what if it's not every 12 hours.     Shows a picture of blood after bowel movements. Started last week, but thought it was something red that he ate. This morning was having bright red and stools have been tarry and coffee grounds.     Has been feeling dizzy and lightheaded. Denies shortness of breath.         1/6 Echo at [facility] - EF 60%.           Recent Labs   Lab Test 04/12/21  0740 02/13/20  1133 06/01/18  1613 02/06/18  0818   CHOL 95  --   --  105   HDL 33*  --   --  35*   LDL 46 42   < > 49   TRIG 82  --   --  103    < > = values in this interval not displayed.       BP Readings from Last 3 Encounters:   02/18/22 117/77   02/18/22 107/69   01/21/22 124/68      Pulse Readings from Last 3 Encounters:   02/18/22 87   02/18/22 77   01/21/22 70             Today's Vitals: /69   Pulse 77   ----------------  Post Discharge Medication Reconciliation Status: discharge medications reconciled and changed, per note/orders.    I spent 15 minutes with this patient today. All changes were made via collaborative practice agreement with Magalis Tucker PA-C. A copy of the visit note was provided to the patient's provider(s).    The patient declined a summary of these recommendations.     Jake Ortega, PharmD  Medication Therapy Management (MTM) Pharmacist  [facility]       Medication Therapy Recommendations  No medication therapy recommendations to display

## 2022-02-17 NOTE — TELEPHONE ENCOUNTER
"Last Written Prescription Date:  3/3/2021  Last Fill Quantity: 90,  # refills: 3   Last office visit provider:  1/4/2022     Requested Prescriptions   Pending Prescriptions Disp Refills     atorvastatin (LIPITOR) 40 MG tablet 90 tablet 3     Sig: [ATORVASTATIN (LIPITOR) 40 MG TABLET] TAKE 1 TABLET(40 MG) BY MOUTH AT BEDTIME       Statins Protocol Passed - 2/17/2022  8:47 AM        Passed - LDL on file in past 12 months     Recent Labs   Lab Test 04/12/21  0740   LDL 46             Passed - No abnormal creatine kinase in past 12 months     No lab results found.             Passed - Recent (12 mo) or future (30 days) visit within the authorizing provider's specialty     Patient has had an office visit with the authorizing provider or a provider within the authorizing providers department within the previous 12 mos or has a future within next 30 days. See \"Patient Info\" tab in inbasket, or \"Choose Columns\" in Meds & Orders section of the refill encounter.              Passed - Medication is active on med list        Passed - Patient is age 18 or older             Mariluz Bravo RN 02/17/22 8:47 AM  "

## 2022-02-18 ENCOUNTER — OFFICE VISIT (OUTPATIENT)
Dept: PHARMACY | Facility: CLINIC | Age: 77
End: 2022-02-18
Payer: COMMERCIAL

## 2022-02-18 ENCOUNTER — OFFICE VISIT (OUTPATIENT)
Dept: FAMILY MEDICINE | Facility: CLINIC | Age: 77
End: 2022-02-18
Payer: MEDICARE

## 2022-02-18 VITALS
WEIGHT: 260 LBS | OXYGEN SATURATION: 96 % | RESPIRATION RATE: 18 BRPM | HEART RATE: 87 BPM | BODY MASS INDEX: 32.67 KG/M2 | SYSTOLIC BLOOD PRESSURE: 117 MMHG | DIASTOLIC BLOOD PRESSURE: 77 MMHG

## 2022-02-18 VITALS — DIASTOLIC BLOOD PRESSURE: 69 MMHG | SYSTOLIC BLOOD PRESSURE: 107 MMHG | HEART RATE: 77 BPM

## 2022-02-18 DIAGNOSIS — I25.810 CORONARY ARTERY DISEASE INVOLVING AUTOLOGOUS ARTERY CORONARY BYPASS GRAFT WITHOUT ANGINA PECTORIS: Primary | ICD-10-CM

## 2022-02-18 DIAGNOSIS — I25.810 CORONARY ARTERY DISEASE INVOLVING AUTOLOGOUS ARTERY CORONARY BYPASS GRAFT WITHOUT ANGINA PECTORIS: ICD-10-CM

## 2022-02-18 DIAGNOSIS — K62.5 RECTAL BLEEDING: Primary | ICD-10-CM

## 2022-02-18 LAB — HGB BLD-MCNC: 12.5 G/DL (ref 13.3–17.7)

## 2022-02-18 PROCEDURE — 99214 OFFICE O/P EST MOD 30 MIN: CPT | Performed by: FAMILY MEDICINE

## 2022-02-18 PROCEDURE — 99606 MTMS BY PHARM EST 15 MIN: CPT | Performed by: PHARMACIST

## 2022-02-18 PROCEDURE — 36415 COLL VENOUS BLD VENIPUNCTURE: CPT | Performed by: FAMILY MEDICINE

## 2022-02-18 PROCEDURE — 85018 HEMOGLOBIN: CPT | Performed by: FAMILY MEDICINE

## 2022-02-18 RX ORDER — NICOTINE POLACRILEX 4 MG/1
20 GUM, CHEWING ORAL DAILY
COMMUNITY

## 2022-02-18 NOTE — PROGRESS NOTES
Assessment/ Plan  1. Rectal bleeding  Seen today by MTM  Follow-up diabetes  Patient mention bright red blood per rectum  Also some black stool, also some dizziness    ------  Black stool is very minimal  Bleeding is mostly external, post stool, history of hemorrhoids  Photos provided, really pretty minimal bleeding  Hemoglobin stable  Turns out that dizziness is not new.  Sometimes gets orthostatic dizziness when he stands up after sitting for a while  Orthostatic blood pressure and pulse are normal    Internal hemorrhoids and external hemorrhoids on exam today.  Nothing actively bleeding    Reassured  Continue Brilinta plus aspirin  Follow-up for increased bleeding, black stools, discussed melena    2. Coronary artery disease involving autologous artery coronary bypass graft without angina pectoris    - Hemoglobin     Body mass index is 32.67 kg/m .    Subjective  CC:  chief complaint  HPI:  Patient is a 76-year-old who had ACS and was admitted to [facility] 1/6.  Drug-eluting stents placed in LAD, dual platelet therapy started.    History of small amount of red blood with hemorrhoids in the past    Much more blood over the last week than he seen before.  Mostly when he wipes but sometimes mixed with stool or after stool.  Denies any pain.    He has had some dizziness.  At [employer] yesterday with sitting and standing.    Stool is brown, dark brown.  Thinks it gets darker with eating chocolate.  Not truly jet black.    Patient Active Problem List   Diagnosis     Fatigue     Obesity     Pure hypercholesterolemia     Essential Hypertension     Renal Insufficiency     Proteinuria     Male Erectile Disorder     Esophageal Reflux     Degenerative joint disease (DJD) of hip     ELIOT on CPAP     Mass of left testicle     Chewing tobacco use     Benign prostatic hyperplasia with lower urinary tract symptoms     Left Spermatocele     Class 1 obesity due to excess calories with serious comorbidity and body mass index  "(BMI) of 32.0 to 32.9 in adult     Type 2 diabetes mellitus with kidney complication, with long-term current use of insulin (H)     Renal insufficiency syndrome     Current medications reviewed as follows:  aspirin 81 MG EC tablet, [ASPIRIN 81 MG EC TABLET] Take 81 mg by mouth daily.  atorvastatin (LIPITOR) 40 MG tablet, [ATORVASTATIN (LIPITOR) 40 MG TABLET] TAKE 1 TABLET(40 MG) BY MOUTH AT BEDTIME  BD EMMETT U/F 32G X 4 MM insulin pen needle, [BD ULTRA-FINE EMMETT PEN NEEDLE 32 GAUGE X 5/32\" NDLE] USE AS DIRECTED  calcium citrate-vitamin D (CITRACAL) 315-200 MG-UNIT TABS per tablet, Take 2 tablets by mouth 2 times daily  cholecalciferol, vitamin D3, (CHOLECALCIFEROL) 1,000 unit tablet, Take 2,000 Units by mouth daily  Continuous Blood Gluc Sensor (FREESTYLE ESTRADA 14 DAY SENSOR) [facility], 1 each every 14 days  CONTOUR NEXT TEST STRIPS strips, [CONTOUR NEXT TEST STRIPS STRIPS] CHECK BLOOD SUGAR BEFORE MEALS AND AT BEDTIME  flash glucose scanning reader (FREESTYLE ESTRADA 14 DAY READER) Misc, [FLASH GLUCOSE SCANNING READER (FREESTYLE ESTRADA 14 DAY READER) MISC] Use 1 application As Directed every 14 (fourteen) days. Test every 8 hours Per  instructions.  insulin degludec (TRESIBA FLEXTOUCH) 100 UNIT/ML pen, [TRESIBA FLEXTOUCH U-100 100 UNIT/ML (3 ML) INJECTION PEN] INJECT 24 UNITS UNDER THE SKIN AT BEDTIME  metFORMIN (GLUCOPHAGE) 500 MG tablet, [METFORMIN (GLUCOPHAGE) 500 MG TABLET] TAKE 2 TABLETS(1000 MG) BY MOUTH TWICE DAILY WITH MEALS  metoprolol tartrate (LOPRESSOR) 25 MG tablet, Take 12.5 mg by mouth  MICROLET LANCET, [MICROLET LANCET] USE TO CHECK BLOOD SUGAR QID as needed  miscellaneous medical supply (BLOOD PRESSURE CUFF) Mis, [MISCELLANEOUS MEDICAL SUPPLY (BLOOD PRESSURE CUFF) MISC] Please provide patient with automatic blood pressure cuff.  Diagnosis: hypertension  pioglitazone (ACTOS) 30 MG tablet, Take 1 tablet (30 mg) by mouth daily  semaglutide (OZEMPIC, 0.25 OR 0.5 MG/DOSE,) 2 MG/1.5ML SOPN pen, " Inject 0.25 mg Subcutaneous once a week for 28 days  ticagrelor (BRILINTA) 90 MG tablet, Take 90 mg by mouth every 12 hours  acetaminophen (TYLENOL) 500 MG tablet, [ACETAMINOPHEN (TYLENOL) 500 MG TABLET] Take 1,000 mg by mouth every 6 (six) hours. (Patient not taking: Reported on 1/21/2022)  omeprazole 20 MG tablet, Take 20 mg by mouth daily    No current facility-administered medications on file prior to visit.     History   Smoking Status     Former Smoker   Smokeless Tobacco     Current User     Types: Chew     Last attempt to quit: 1/1/2016     Social History     Social History Narrative     Not on file     Patient Care Team:  Magalis Tucker PA-C as PCP - General  Magalis Tucker PA-C as Assigned PCP  Jake Ortega, PharmD as Pharmacist (Pharmacist)  ROS  As      Objective  Physical Exam  Vitals:    02/18/22 1550 02/18/22 1600 02/18/22 1601 02/18/22 1602   BP: 133/79 128/74 129/82 117/77   BP Location: Right arm Right arm Right arm Right arm   Patient Position: Sitting Supine Sitting Standing   Cuff Size: Adult Large Adult Large Adult Large Adult Large   Pulse: 85 76 81 87   Resp: 18      SpO2: 96%      Weight: 117.9 kg (260 lb)        Patient is pleasant, here with his wife.  Good color.  Rectal exam shows soft internal hemorrhoids on left side.    Digital rectal exam shows soft hemorrhoids internally.  No blood noted on glove   Anoscopic exam very difficult because patient has soft tissue just inside the anus that occludes deeper view.  Appears normal but does show some internal hemorrhoids, not bleeding  diagnostics  Results for orders placed or performed in visit on 02/18/22   Hemoglobin     Status: Abnormal   Result Value Ref Range    Hemoglobin 12.5 (L) 13.3 - 17.7 g/dL         Please note: Voice recognition software was used in this dictation.  It may therefore contain typographical errors.      Answers for HPI/ROS submitted by the patient on 2/18/2022  How many servings of fruits and  vegetables do you eat daily?: 2-3  On average, how many sweetened beverages do you drink each day (Examples: soda, juice, sweet tea, etc.  Do NOT count diet or artificially sweetened beverages)?: 0  How many minutes a day do you exercise enough to make your heart beat faster?: 30 to 60  How many days a week do you exercise enough to make your heart beat faster?: 3 or less  How many days per week do you miss taking your medication?: 0  What is the reason for your visit today?: dizziness  When did your symptoms begin?: 1-3 days ago  What are your symptoms?: headache and spinning  How would you describe these symptoms?: Severe  Are your symptoms:: Staying the same  Have you had these symptoms before?: Yes  Have you tried or received treatment for these symptoms before?: No  Is there anything that makes you feel worse?: when he was standing  Is there anything that makes you feel better?: resting

## 2022-03-07 NOTE — PROGRESS NOTES
Medication Therapy Management (MTM) Encounter    ASSESSMENT:                            Medication Adherence/Access: Cost concerns with insulin, ozempic, and brillinta. Long-term will try to minimize insulin needs. Could discuss with cardiology about plavix vs brillinta.      Rectal Bleeding: Improved but still present. Continue to monitor.       Type 2 Diabetes: Last A1C at goal <7%. At goal of being in target range at least 70% of the time. Had 2 low episodes- no symptoms. With CVD benefits of GLP1, will try to maximize dose and reduce insulin as much as able - should also help to reduce costs related to insulin. Given that pt is having some low sugars, will reduce Tresiba and also give pt instructions on how to lower should he continue to have low episodes.         CAD: Last LDL at goal <70. BP at goal <130/80. Pulse at goal . Appropriately using high intensity statin and DAPT. Suggested discussing with cardiology if pt could switch from Brillinta to Clopidogrel due to cost concerns.     Mood: PHQ9 above goal <5. Reviewed that this is not a formal diagnosis of depression, but indicates depressive symptoms that may warrant treatment. Pt declined medication or psychotherapy referral today. Will start with Vitamin D check as below.       Low Vitamin D: No levels on file. Is using a supplement. Reviewed that low levels can contribute to depressive symptoms. Will plan for level check today - target goal 45-60.       PLAN:                            1. Take a second injection of Ozempic 0.25 mg today then next week increase to 0.5 mg weekly.     2. Decrease Tresiba to 20 units. If you have any low blood sugars, decrease another 18 units. Wait another 7 days, if still have lows decrease to 16 units.     3. Ask the cardiologist at your next visit if you could switch from Brilinta to Clopidogrel (Plavix) as this will likely be significantly lower cost for you.     4. Vitamin D level       Follow-up: Return in about  4 weeks (around 4/5/2022) for Medication Management Pharmacist, in person.    SUBJECTIVE/OBJECTIVE:                          Goyo Cole is a 76 year old male coming in for an initial visit. He was referred to me from Magalis Tucker PA-C. Patient was accompanied by wife... Today's visit is a follow-up MTM visit from 02/18/2022.     Reason for visit: Referred for diabetes management. Had some issues with it not registering correctly.     Allergies/ADRs: Reviewed in chart  Past Medical History: Reviewed in chart  Tobacco: He reports that he has quit smoking. His smokeless tobacco use includes chew. Still using chewing tobacco - working on reducing.   Alcohol: Last drink was at [employer]. Sometimes, he'll go out and have a lot to drink.       Medication Adherence/Access: Cost concerns with Ozempic and Brillinta.       Rectal Bleeding: Not currently using medication. Not as bad as it was at last clinic visit. Does have some irritation or itching. Declines need for topical agents for the itching.       Type 2 Diabetes: Prescribed Tresiba 22 units at bedtime, Metformin 500 mg 2 tabs twice daily. Pioglitazone 30 mg daily. In January, stopped Novolog and started Ozempic 0.25 mg weekly. Patient is not experiencing side effects.  Blood sugar monitoring: Continuous Glucose Monitor. Ranges (patient reported):             Symptoms of low blood sugar? Doesn't have symptoms.   Symptoms of high blood sugar? none    Diet/Exercise: Tends to overeat sweets. Has noticed some reduction in appetite, especially at lunch and dinner. Not having seconds at dinner.     Hemoglobin A1C   Date Value Ref Range Status   01/04/2022 7.4 (H) 0.0 - 5.6 % Final     Comment:     Normal <5.7%   Prediabetes 5.7-6.4%    Diabetes 6.5% or higher     Note: Adopted from ADA consensus guidelines.   07/09/2021 6.7 (H) <=5.6 % Final   04/12/2021 6.6 (H) <=5.6 % Final      Microalbumin Urine mg/dL   Date Value Ref Range Status   01/21/2021 1.79 0.00 - 1.99  "mg/dL Final      No results found for: UCRR  LDL Cholesterol Calculated   Date Value Ref Range Status   04/12/2021 46 <=129 mg/dL Final     LDL Cholesterol Direct   Date Value Ref Range Status   02/13/2020 42 <=129 mg/dl Final     Creatinine   Date Value Ref Range Status   01/04/2022 1.48 (H) 0.70 - 1.30 mg/dL Final     GFR Estimate   Date Value Ref Range Status   01/04/2022 49 (L) >60 mL/min/1.73m2 Final     Comment:     Effective December 21, 2021 eGFRcr in adults is calculated using the 2021 CKD-EPI creatinine equation which includes age and gender (Dharmesh et al., NE, DOI: 10.1056/FYMByq9579015)   07/09/2021 47 (L) >60 mL/min/1.73m2 Final     GFR Estimate If Black   Date Value Ref Range Status   07/09/2021 57 (L) >60 mL/min/1.73m2 Final         CAD: Admitted to [facility] on 1/5 with chest pain. 2 stents to LAD. Prescribed Metoprolol Tartrate 25 mg 0.5 tabs twice daily, and Brillinta 90 mg twice daily. Continues Aspirin 81 mg daily, Atorvastatin 40 mg daily. Cardiology recently started Lisinopril 5 mg daily     1/6 Echo at [facility] - EF 60%.     Had some dizziness with new start of Lisinopril - better with taking it at bedtime.       Recent Labs   Lab Test 04/12/21  0740 02/13/20  1133 06/01/18  1613 02/06/18  0818   CHOL 95  --   --  105   HDL 33*  --   --  35*   LDL 46 42   < > 49   TRIG 82  --   --  103    < > = values in this interval not displayed.       BP Readings from Last 3 Encounters:   03/08/22 107/65   02/18/22 117/77   02/18/22 107/69      Pulse Readings from Last 3 Encounters:   03/08/22 73   02/18/22 87   02/18/22 77         Mood: I don't feel like being a person. Doesn't feel like doing a lot.     Wife notes that pt is an \"Eeyore\" at baseline. But also feels like his symptoms may be seasonal because most of his hobbies are outdoors in warmer weather. Wife feels recent cardiac concerns have also contributed.     PHQ-9 score:    PHQ 3/8/2022   PHQ-9 Total Score 10   Q9: Thoughts of better off " dead/self-harm past 2 weeks Not at all           Low Vitamin D: Prescribed Vitamin D3 2000 units daily .     Vitamin D Deficiency Screening Results:  No results found for: VITDT             Today's Vitals: /65   Pulse 73   ----------------    I spent 45 minutes with this patient today. All changes were made via collaborative practice agreement with Magalis Tucker PA-C. A copy of the visit note was provided to the patient's provider(s).    The patient was given a summary of these recommendations.     Jake Ortega, PharmD  Medication Therapy Management (MTM) Pharmacist  [facility]       Medication Therapy Recommendations  Coronary artery disease involving autologous artery coronary bypass graft without angina pectoris    Current Medication: ticagrelor (BRILINTA) 90 MG tablet   Rationale: Cannot afford medication product - Cost - Adherence   Recommendation: Change Medication - clopidogrel 75 MG tablet   Status: Contact Provider - Awaiting Response         Type 2 diabetes mellitus with kidney complication, with long-term current use of insulin (H)    Current Medication: insulin degludec (TRESIBA FLEXTOUCH) 100 UNIT/ML pen (Discontinued)   Rationale: Dose too high - Dosage too high - Safety   Recommendation: Decrease Dose   Status: Accepted per CPA          Current Medication: semaglutide (OZEMPIC, 0.25 OR 0.5 MG/DOSE,) 2 MG/1.5ML SOPN pen   Rationale: Dose too low - Dosage too low - Effectiveness   Recommendation: Increase Dose   Status: Accepted per CPA

## 2022-03-08 ENCOUNTER — LAB (OUTPATIENT)
Dept: LAB | Facility: CLINIC | Age: 77
End: 2022-03-08
Payer: MEDICARE

## 2022-03-08 ENCOUNTER — ALLIED HEALTH/NURSE VISIT (OUTPATIENT)
Dept: PHARMACY | Facility: CLINIC | Age: 77
End: 2022-03-08
Payer: COMMERCIAL

## 2022-03-08 VITALS — DIASTOLIC BLOOD PRESSURE: 65 MMHG | HEART RATE: 73 BPM | SYSTOLIC BLOOD PRESSURE: 107 MMHG

## 2022-03-08 DIAGNOSIS — E55.9 VITAMIN D DEFICIENCY: ICD-10-CM

## 2022-03-08 DIAGNOSIS — K62.5 RECTAL BLEEDING: ICD-10-CM

## 2022-03-08 DIAGNOSIS — I25.810 CORONARY ARTERY DISEASE INVOLVING AUTOLOGOUS ARTERY CORONARY BYPASS GRAFT WITHOUT ANGINA PECTORIS: ICD-10-CM

## 2022-03-08 DIAGNOSIS — F32.A DEPRESSION, UNSPECIFIED DEPRESSION TYPE: ICD-10-CM

## 2022-03-08 DIAGNOSIS — E11.22 TYPE 2 DIABETES MELLITUS WITH CHRONIC KIDNEY DISEASE, WITH LONG-TERM CURRENT USE OF INSULIN, UNSPECIFIED CKD STAGE (H): Primary | ICD-10-CM

## 2022-03-08 DIAGNOSIS — Z79.4 TYPE 2 DIABETES MELLITUS WITH CHRONIC KIDNEY DISEASE, WITH LONG-TERM CURRENT USE OF INSULIN, UNSPECIFIED CKD STAGE (H): Primary | ICD-10-CM

## 2022-03-08 PROCEDURE — 99607 MTMS BY PHARM ADDL 15 MIN: CPT | Performed by: PHARMACIST

## 2022-03-08 PROCEDURE — 36415 COLL VENOUS BLD VENIPUNCTURE: CPT

## 2022-03-08 PROCEDURE — 99606 MTMS BY PHARM EST 15 MIN: CPT | Performed by: PHARMACIST

## 2022-03-08 PROCEDURE — 82306 VITAMIN D 25 HYDROXY: CPT

## 2022-03-08 RX ORDER — LISINOPRIL 5 MG/1
10 TABLET ORAL DAILY
COMMUNITY
Start: 2022-02-07 | End: 2023-04-07

## 2022-03-08 RX ORDER — INSULIN DEGLUDEC 100 U/ML
20 INJECTION, SOLUTION SUBCUTANEOUS EVERY MORNING
Qty: 15 ML | Refills: 3 | Status: SHIPPED | OUTPATIENT
Start: 2022-03-08 | End: 2022-04-13

## 2022-03-08 RX ORDER — SEMAGLUTIDE 1.34 MG/ML
0.5 INJECTION, SOLUTION SUBCUTANEOUS WEEKLY
Qty: 1.5 ML | Refills: 3 | Status: SHIPPED | OUTPATIENT
Start: 2022-03-08 | End: 2022-04-05

## 2022-03-08 ASSESSMENT — PATIENT HEALTH QUESTIONNAIRE - PHQ9: SUM OF ALL RESPONSES TO PHQ QUESTIONS 1-9: 10

## 2022-03-08 NOTE — PATIENT INSTRUCTIONS
Recommendations from today's MTM visit:                                                       1. Take a second injection of Ozempic 0.25 mg today then next week increase to 0.5 mg weekly.     2. Decrease Tresiba to 20 units. If you have any low blood sugars, decrease another 18 units. Wait another 7 days, if still have lows decrease to 16 units.     3. Ask the cardiologist at your next visit if you could switch from Brilinta to Clopidogrel (Plavix) as this will likely be significantly lower cost for you.     Follow-up: Return in about 4 weeks (around 4/5/2022) for Medication Management Pharmacist, in person.    It was great to speak with you today.  I value your experience and would be very thankful for your time with providing feedback on our clinic survey. You may receive a survey via email or text message in the next few days.     To schedule another MTM appointment, please call the clinic directly or you may call the MTM scheduling line at [phone] or toll-free at [phone].     My Clinical Pharmacist's contact information:                                                      Please feel free to contact me with any questions or concerns you have.      Jake Ortega, PharmD  Medication Therapy Management (MTM) Pharmacist  [facility]

## 2022-03-09 LAB — DEPRECATED CALCIDIOL+CALCIFEROL SERPL-MC: 56 UG/L (ref 30–80)

## 2022-03-22 ENCOUNTER — MYC MEDICAL ADVICE (OUTPATIENT)
Dept: FAMILY MEDICINE | Facility: CLINIC | Age: 77
End: 2022-03-22
Payer: MEDICARE

## 2022-03-22 DIAGNOSIS — R42 VERTIGO: ICD-10-CM

## 2022-03-22 DIAGNOSIS — R42 DIZZINESS: Primary | ICD-10-CM

## 2022-04-11 NOTE — PROGRESS NOTES
Medication Therapy Management (MTM) Encounter    ASSESSMENT:                            Medication Adherence/Access: Cost concerns with insulin, ozempic, and brillinta. Long-term will try to minimize insulin needs.      Hemorrhoids/Constipation: Having smaller, likely drier bowel movements. Would benefit from trial of laxative and stool softener combo. Patient would like to purchase this over-the-counter. Treating constipation likely will help with reducing stomach pain and hemorrhoids.     Type 2 Diabetes: Last A1C at goal <7%. At goal of being in target range at least 70% of the time. Had 12 low episodes- with dizziness. With CVD benefits of GLP1, will try to maximize dose and reduce insulin as much as able - should also help to reduce costs related to insulin. Given patient is having multiple low episodes and abdominal pain, possible from increased dose of Ozempic, will leave Ozempic dose the same and decrease insulin dose. Reconsider increasing Ozempic and eliminating insulin at follow-up.       CAD: Last LDL at goal <70. BP at goal <130/80. Pulse at goal . Appropriately using high intensity statin and DAPT.     Mood: PHQ9 above goal <5. Reviewed that this is not a formal diagnosis of depression, but indicates depressive symptoms that may warrant treatment. Despite high PHQ9. patient denies being depressed, however would likely benefit from trial of Duloxetine to help with mood, knee pain, and potentially reduce urinary frequency. Declines at this time but will think about it.       Low Vitamin D: WNL with target goal of 45-60. Is using a supplement.        PLAN:                            1. Start Senna/Docusate 8.6-50 mg     2. Continue with 0.5 mg of Ozempic.     3. Decrease Tresiba to 10 units.     4. Consider taking Duloxetine once a day at bedtime to help with mood, reduce urinary frequency, and knee pain      Follow-up: Return in about 5 weeks (around 5/18/2022) for Medication Management Pharmacist,  in person.    SUBJECTIVE/OBJECTIVE:                          Goyo Cole is a 76 year old male coming in for a follow-up visit. He was referred to me from Magalis Tucker PA-C. Patient was accompanied by wife.. Today's visit is a follow-up MTM visit from 02/18/2022.     Reason for visit: Referred for diabetes management.      Allergies/ADRs: Reviewed in chart  Past Medical History: Reviewed in chart  Tobacco: He reports that he has quit smoking. His smokeless tobacco use includes chew. Still using chewing tobacco - working on reducing.   Alcohol: Last drink was at [employer]. Sometimes, he'll go out and have a lot to drink.       Medication Adherence/Access: Cost concerns with Ozempic.  Patient's wife helps to set up with medications.     Sets all the vitamins in an old pill vial for the day. Adds in daily medicines.       Hemorrhoids: Not currently using medication. Patient is controlling symptoms with wet wipes and water.       Type 2 Diabetes: Prescribed Tresiba 20 units at bedtime, Metformin 500 mg 2 tabs twice daily. Pioglitazone 30 mg daily. In January, stopped Novolog and started Ozempic 0.5 mg weekly. Patient is not experiencing side effects. At last MTM visit, decreased Tresiba and increased Ozempic.      - Patient endorses upper abd pain since increasing Ozempic. Pain waxes and weans.     Blood sugar monitoring: Continuous Glucose Monitor.               Symptoms of low blood sugar? Dizziness   Symptoms of high blood sugar? none    Diet/Exercise: Tends to overeat sweets. Has noticed some reduction in appetite, especially at lunch and dinner. Not having seconds at dinner.     Hemoglobin A1C   Date Value Ref Range Status   01/04/2022 7.4 (H) 0.0 - 5.6 % Final     Comment:     Normal <5.7%   Prediabetes 5.7-6.4%    Diabetes 6.5% or higher     Note: Adopted from ADA consensus guidelines.   07/09/2021 6.7 (H) <=5.6 % Final   04/12/2021 6.6 (H) <=5.6 % Final      Microalbumin Urine mg/dL   Date Value Ref  "Range Status   01/21/2021 1.79 0.00 - 1.99 mg/dL Final      No results found for: UCRR  LDL Cholesterol Calculated   Date Value Ref Range Status   04/12/2021 46 <=129 mg/dL Final     LDL Cholesterol Direct   Date Value Ref Range Status   02/13/2020 42 <=129 mg/dl Final     Creatinine   Date Value Ref Range Status   01/04/2022 1.48 (H) 0.70 - 1.30 mg/dL Final     GFR Estimate   Date Value Ref Range Status   01/04/2022 49 (L) >60 mL/min/1.73m2 Final     Comment:     Effective December 21, 2021 eGFRcr in adults is calculated using the 2021 CKD-EPI creatinine equation which includes age and gender (Dharmesh et al., NEJ, DOI: 10.1056/INFSzt7038630)   07/09/2021 47 (L) >60 mL/min/1.73m2 Final     GFR Estimate If Black   Date Value Ref Range Status   07/09/2021 57 (L) >60 mL/min/1.73m2 Final         CAD: Admitted to [facility] on 1/5 with chest pain. 2 stents to LAD. Prescribed Metoprolol Tartrate 25 mg 0.5 tabs twice daily, Lisinopril 5mg daily, and Brillinta 90 mg twice daily. Continues Aspirin 81 mg daily, Atorvastatin 40 mg daily.     1/6 Echo at [facility] - EF 60%.     Patient with continued dizziness since last visit, worse with change in position (sitting to standing, etc). Describes the \"sensation of being drunk\".  Has appt with neurologist to discuss dizziness       Recent Labs   Lab Test 04/12/21  0740 02/13/20  1133 06/01/18  1613 02/06/18  0818   CHOL 95  --   --  105   HDL 33*  --   --  35*   LDL 46 42   < > 49   TRIG 82  --   --  103    < > = values in this interval not displayed.       BP Readings from Last 3 Encounters:   04/13/22 121/71   03/08/22 107/65   02/18/22 117/77      Pulse Readings from Last 3 Encounters:   04/13/22 92   03/08/22 73   02/18/22 87         Mood: Not currently on medications     At last visit reported:   I don't feel like being a person. Doesn't feel like doing a lot.   Wife notes that pt is an \"Eeyore\" at baseline.    Today denies depression concerns, but wife again describes he is an " "\"eeyore\" at baseline and  things have been exacerbated by winter, different medications, COVID, and recent heart issues.    Sleeps well at night but has to get up frequently to use the bathroom  Patient does not have much energy and lethargic - wife feels this is \"just his personality\"    Patient denies being depressed although PHQ 9 score of 10    PHQ-9 score:    PHQ 3/8/2022   PHQ-9 Total Score 10   Q9: Thoughts of better off dead/self-harm past 2 weeks Not at all         Low Vitamin D: Prescribed Vitamin D3 2000 units daily .     Vitamin D Deficiency Screening Results:  Lab Results   Component Value Date    VITDT 56 03/08/2022        Constipation: patient reports not having BM that are as large as his baseline. Endorses \"mushy\" stools last week related to a head cold. Not currently using any medications but is open to trial.       Knee Pain: Currently not using medication. Previously tried Diclofenac gel but stopped secondary to hearing about heart attack risk. Patient educated that the risk of heart issues is more so related to oral formulation.     Reports his pain is off and on  Patient would be open to restarting topical Diclofenac as needed         Today's Vitals: /71   Pulse 92   ----------------    I spent 60 minutes with this patient today. All changes were made via collaborative practice agreement with Magalis Tucker PA-C. A copy of the visit note was provided to the patient's provider(s).    The patient was given a summary of these recommendations.       Vicky Sanchez, PD IV Student     I spent 100% of the time in direct supervision of the PharmD IV student. I have reviewed the note and agree with the assessment/plan as it is now written.     Jake Ortega, PharmD  Medication Therapy Management (MTM) Pharmacist  [facility]         Medication Therapy Recommendations  Constipation, unspecified constipation type    Rationale: Untreated condition - Needs additional " medication therapy - Indication   Recommendation: Start Medication - Senna S 8.6-50 MG Tabs   Status: Accepted per CPA         Type 2 diabetes mellitus with kidney complication, with long-term current use of insulin (H)    Current Medication: insulin degludec (TRESIBA FLEXTOUCH) 100 UNIT/ML pen   Rationale: Dose too high - Dosage too high - Safety   Recommendation: Decrease Dose   Status: Accepted per CPA

## 2022-04-13 ENCOUNTER — ALLIED HEALTH/NURSE VISIT (OUTPATIENT)
Dept: PHARMACY | Facility: CLINIC | Age: 77
End: 2022-04-13
Payer: COMMERCIAL

## 2022-04-13 VITALS — DIASTOLIC BLOOD PRESSURE: 71 MMHG | HEART RATE: 92 BPM | SYSTOLIC BLOOD PRESSURE: 121 MMHG

## 2022-04-13 DIAGNOSIS — K62.5 RECTAL BLEEDING: ICD-10-CM

## 2022-04-13 DIAGNOSIS — I25.810 CORONARY ARTERY DISEASE INVOLVING AUTOLOGOUS ARTERY CORONARY BYPASS GRAFT WITHOUT ANGINA PECTORIS: ICD-10-CM

## 2022-04-13 DIAGNOSIS — F32.A DEPRESSION, UNSPECIFIED DEPRESSION TYPE: ICD-10-CM

## 2022-04-13 DIAGNOSIS — K59.00 CONSTIPATION, UNSPECIFIED CONSTIPATION TYPE: ICD-10-CM

## 2022-04-13 DIAGNOSIS — E55.9 VITAMIN D DEFICIENCY: ICD-10-CM

## 2022-04-13 DIAGNOSIS — Z78.9 TAKES DIETARY SUPPLEMENTS: ICD-10-CM

## 2022-04-13 DIAGNOSIS — Z79.4 TYPE 2 DIABETES MELLITUS WITH CHRONIC KIDNEY DISEASE, WITH LONG-TERM CURRENT USE OF INSULIN, UNSPECIFIED CKD STAGE (H): Primary | ICD-10-CM

## 2022-04-13 DIAGNOSIS — E11.22 TYPE 2 DIABETES MELLITUS WITH CHRONIC KIDNEY DISEASE, WITH LONG-TERM CURRENT USE OF INSULIN, UNSPECIFIED CKD STAGE (H): Primary | ICD-10-CM

## 2022-04-13 PROCEDURE — 99607 MTMS BY PHARM ADDL 15 MIN: CPT | Performed by: PHARMACIST

## 2022-04-13 PROCEDURE — 99606 MTMS BY PHARM EST 15 MIN: CPT | Performed by: PHARMACIST

## 2022-04-13 RX ORDER — SENNA AND DOCUSATE SODIUM 50; 8.6 MG/1; MG/1
1 TABLET, FILM COATED ORAL
COMMUNITY
Start: 2022-04-13 | End: 2023-04-07

## 2022-04-13 RX ORDER — INSULIN DEGLUDEC 100 U/ML
10 INJECTION, SOLUTION SUBCUTANEOUS EVERY MORNING
Qty: 15 ML | Refills: 3 | Status: SHIPPED | OUTPATIENT
Start: 2022-04-13 | End: 2022-05-18

## 2022-04-13 NOTE — PATIENT INSTRUCTIONS
Recommendations from today's MTM visit:                                                       1. Start Senna/Docusate 8.6-50 mg 1 tablet by mouth daily as needed     2. Continue with 0.5 mg of Ozempic.     3. Decrease Tresiba to 10 units.     4. Ask neurology about safety of getting back on your bike.     5. Consider taking Duloxetine once a day at bedtime to help with mood, reduce urinary frequency, and knee pain    Follow-up: Return in about 5 weeks (around 5/18/2022) for Medication Management Pharmacist, in person.    It was great to speak with you today.  I value your experience and would be very thankful for your time with providing feedback on our clinic survey. You may receive a survey via email or text message in the next few days.     To schedule another MTM appointment, please call the clinic directly or you may call the MTM scheduling line at [phone] or toll-free at [phone].     My Clinical Pharmacist's contact information:                                                      Please feel free to contact me with any questions or concerns you have.      Jake Ortega, PharmD  Medication Therapy Management (MTM) Pharmacist  [facility]

## 2022-05-17 NOTE — PROGRESS NOTES
Medication Therapy Management (MTM) Encounter    ASSESSMENT:                            Medication Adherence/Access: No concerns identified.     Itching: Reviewed side effects of steroid creams are related to long-term use. Reviewed prescribed instructions are to use for 2 weeks, then take a break for 1 week and repeat as needed. This should prevent adverse medication effects.     Hemorrhoids/Constipation: Had loose stools with Senna-Docusate. Stopped use. Declined needs for alternatives. Hemorrhoids not currently bleeding or bothersome.     Type 2 Diabetes: Last A1C at goal <7% - may be appropriate to relax goal to <7.5% due to age and persistent dizziness. With last reduction in insulin, having less hypoglycemia, but still present. Average blood glucose at goal <180. Has now dropped below goal of being in target range >70%. With continued lows, will discontinue insulin today. Increase GLP1 dose to maintain blood glucose control. At next follow-up, can increase Ozempic to 2 mg if needed.      CAD: Last LDL at goal <70. BP at goal <130/80. Pulse at goal . Appropriately using high intensity statin and DAPT.     Has appointment scheduled with ENT for dizziness/vertigo symptoms.     Mood: PHQ9 above goal <5. No formal diagnosis of depression, but indicates depressive symptoms that may warrant treatment. Despite high PHQ9. patient denies being depressed, however would likely benefit from trial of Duloxetine to help with mood, knee pain, and potentially reduce urinary frequency. Declines at this time but will think about it.       Low Vitamin D: WNL with target goal of 45-60. Is using a supplement.        PLAN:                            1. Increase Ozempic to 1 mg weekly.   2. Stop Tresiba     Follow-up: Return in about 6 weeks (around 6/28/2022) for Medication Management Pharmacist, in person.    SUBJECTIVE/OBJECTIVE:                          Goyo Cole is a 76 year old male coming in for a follow-up visit. He  "was referred to me from Magalis Tucker PA-C. Patient was accompanied by wife.. Today's visit is a follow-up MTM visit from 04/13/2022.     Reason for visit: Referred for diabetes management.      Allergies/ADRs: Reviewed in chart  Past Medical History: Reviewed in chart  Tobacco: He reports that he has quit smoking. His smokeless tobacco use includes chew. Still using chewing tobacco - working on reducing.   Alcohol: Last drink was at [employer]. Sometimes, he'll go out and have a lot to drink.       Medication Adherence/Access: Cost concerns with Ozempic.  Patient's wife helps to set up with medications.     Sets all the vitamins in an old pill vial for the day. Adds in daily medicines.       Itching: Reports \"constant itching\" since January. In the stomach area, and lower left leg.     Saw a dermatologist who prescribed Augmented Betamethasone, but he hasn't used it. \"you use it for two weeks and the body part you put it on falls off\"   Doesn't use a regular moisturizer lotion. Uses a moisturizing soap.     Denies allergy symptoms. But wife says he's got congestion, allergies, something in his throat. Sneezing a lot. Post nasal drip.        Hemorrhoids/Constipation: Not currently using medication. Patient is controlling symptoms with wet wipes and water. At last visit, started Senna-Docusate 8.6-50 mg daily as needed. \"took an orange pill\" for two days had some runny bowel movements. No longer having blood when wiping.         Type 2 Diabetes: Prescribed Tresiba 10 units at bedtime (decreased at last visit), Metformin 500 mg 2 tabs twice daily. Pioglitazone 30 mg daily. In January, stopped Novolog and started Ozempic 0.5 mg weekly. Patient is not experiencing side effects.     Blood sugar monitoring: Continuous Glucose Monitor.               Symptoms of low blood sugar? Dizziness   Symptoms of high blood sugar? none    Diet/Exercise: Sometimes has cravings for sweets and chocolates. Quit tobacco about a " "month ago, and not liking the taste of foods.  \"everything tastes clinical\"     Has noticed about 7-8# weight loss. Now fitting into 38 size jeans that he hasn't worn for a while.     Hemoglobin A1C   Date Value Ref Range Status   01/04/2022 7.4 (H) 0.0 - 5.6 % Final     Comment:     Normal <5.7%   Prediabetes 5.7-6.4%    Diabetes 6.5% or higher     Note: Adopted from ADA consensus guidelines.   07/09/2021 6.7 (H) <=5.6 % Final   04/12/2021 6.6 (H) <=5.6 % Final      Microalbumin Urine mg/dL   Date Value Ref Range Status   01/21/2021 1.79 0.00 - 1.99 mg/dL Final      No results found for: UCRR  LDL Cholesterol Calculated   Date Value Ref Range Status   04/12/2021 46 <=129 mg/dL Final     LDL Cholesterol Direct   Date Value Ref Range Status   02/13/2020 42 <=129 mg/dl Final     Creatinine   Date Value Ref Range Status   01/04/2022 1.48 (H) 0.70 - 1.30 mg/dL Final     GFR Estimate   Date Value Ref Range Status   01/04/2022 49 (L) >60 mL/min/1.73m2 Final     Comment:     Effective December 21, 2021 eGFRcr in adults is calculated using the 2021 CKD-EPI creatinine equation which includes age and gender (Dharmesh et al., NEJ, DOI: 10.1056/TYCMmr2516084)   07/09/2021 47 (L) >60 mL/min/1.73m2 Final     GFR Estimate If Black   Date Value Ref Range Status   07/09/2021 57 (L) >60 mL/min/1.73m2 Final         CAD: Admitted to [facility] on 1/5 with chest pain. 2 stents to LAD. Prescribed Metoprolol Tartrate 25 mg 0.5 tabs twice daily, Lisinopril 5mg daily, and Brillinta 90 mg twice daily. Continues Aspirin 81 mg daily, Atorvastatin 40 mg daily.     1/6 Echo at [facility] - EF 60%.     Patient with continued dizziness since last visit, worse with change in position (sitting to standing, etc). Describes the \"sensation of being drunk\".     Was scheduled with Neurology in early May, but had to be rescheduled and wasn't able to get in until October.     Cardiology recommended he see Neurology.     Recent Labs   Lab Test 04/12/21  0740 " 02/13/20  1133 06/01/18  1613 02/06/18  0818   CHOL 95  --   --  105   HDL 33*  --   --  35*   LDL 46 42   < > 49   TRIG 82  --   --  103    < > = values in this interval not displayed.       BP Readings from Last 3 Encounters:   05/18/22 126/69   04/13/22 121/71   03/08/22 107/65      Pulse Readings from Last 3 Encounters:   05/18/22 74   04/13/22 92   03/08/22 73       Mood: Not currently on medications     Felt dizzy when riding the . Worries about safety of being able to ride his motorcycle. This has been hard on his mood.     PHQ-9 score:    PHQ 3/8/2022   PHQ-9 Total Score 10   Q9: Thoughts of better off dead/self-harm past 2 weeks Not at all         Low Vitamin D: Prescribed Vitamin D3 2000 units daily .     Vitamin D Deficiency Screening Results:  Lab Results   Component Value Date    VITDT 56 03/08/2022         Knee Pain: Not using Diclofenac 1% gel - has it. Used it once. Didn't find it helpful.     Reports his pain is off and on. Typically worse at night. Doesn't bother him while sleep. Sometimes with activity, wears a brace.       Today's Vitals: /69   Pulse 74   ----------------    I spent 60 minutes with this patient today. All changes were made via collaborative practice agreement with Magalis Tucker PA-C. A copy of the visit note was provided to the patient's provider(s).    The patient was given a summary of these recommendations.     Jake Ortega, JuanpabloD  Medication Therapy Management (MTM) Pharmacist  [facility]       Medication Therapy Recommendations  Type 2 diabetes mellitus with kidney complication, with long-term current use of insulin (H)    Current Medication: Semaglutide, 1 MG/DOSE, 4 MG/3ML SOPN   Rationale: Dose too low - Dosage too low - Effectiveness   Recommendation: Increase Dose - OZEMPIC (1 MG/DOSE) SC   Status: Accepted per CPA          Current Medication: insulin degludec (TRESIBA FLEXTOUCH) 100 UNIT/ML pen (Discontinued)   Rationale:  No medical indication at this time - Unnecessary medication therapy - Indication   Recommendation: Discontinue Medication   Status: Accepted per CPA

## 2022-05-18 ENCOUNTER — OFFICE VISIT (OUTPATIENT)
Dept: PHARMACY | Facility: CLINIC | Age: 77
End: 2022-05-18
Payer: COMMERCIAL

## 2022-05-18 VITALS — DIASTOLIC BLOOD PRESSURE: 69 MMHG | HEART RATE: 74 BPM | SYSTOLIC BLOOD PRESSURE: 126 MMHG

## 2022-05-18 DIAGNOSIS — F32.A DEPRESSION, UNSPECIFIED DEPRESSION TYPE: ICD-10-CM

## 2022-05-18 DIAGNOSIS — I25.810 CORONARY ARTERY DISEASE INVOLVING AUTOLOGOUS ARTERY CORONARY BYPASS GRAFT WITHOUT ANGINA PECTORIS: ICD-10-CM

## 2022-05-18 DIAGNOSIS — Z79.4 TYPE 2 DIABETES MELLITUS WITH CHRONIC KIDNEY DISEASE, WITH LONG-TERM CURRENT USE OF INSULIN, UNSPECIFIED CKD STAGE (H): Primary | ICD-10-CM

## 2022-05-18 DIAGNOSIS — E11.22 TYPE 2 DIABETES MELLITUS WITH CHRONIC KIDNEY DISEASE, WITH LONG-TERM CURRENT USE OF INSULIN, UNSPECIFIED CKD STAGE (H): Primary | ICD-10-CM

## 2022-05-18 DIAGNOSIS — J31.0 RHINITIS, UNSPECIFIED TYPE: ICD-10-CM

## 2022-05-18 DIAGNOSIS — K62.5 RECTAL BLEEDING: ICD-10-CM

## 2022-05-18 DIAGNOSIS — E55.9 VITAMIN D DEFICIENCY: ICD-10-CM

## 2022-05-18 DIAGNOSIS — Z78.9 TAKES DIETARY SUPPLEMENTS: ICD-10-CM

## 2022-05-18 DIAGNOSIS — K59.00 CONSTIPATION, UNSPECIFIED CONSTIPATION TYPE: ICD-10-CM

## 2022-05-18 PROCEDURE — 99606 MTMS BY PHARM EST 15 MIN: CPT | Performed by: PHARMACIST

## 2022-05-18 PROCEDURE — 99607 MTMS BY PHARM ADDL 15 MIN: CPT | Performed by: PHARMACIST

## 2022-05-18 RX ORDER — MOMETASONE FUROATE MONOHYDRATE 50 UG/1
2 SPRAY, METERED NASAL DAILY
Qty: 17 G | Refills: 1 | Status: SHIPPED | OUTPATIENT
Start: 2022-05-18 | End: 2023-03-31

## 2022-05-18 NOTE — PATIENT INSTRUCTIONS
"Recommendations from today's MTM visit:                                                       Stop Tresiba   Increase Ozempic to 1 mg weekly - I sent a new prescription   Try Mometasone nasal spray 2 sprays each nostril once daily to relieve congestion and postnasal drip.     Follow-up: Return in about 6 weeks (around 6/28/2022) for Medication Management Pharmacist, in person.    It was great speaking with you today.  I value your experience and would be very thankful for your time in providing feedback in our clinic survey. In the next few days, you may receive an email or text message from [employer] with a link to a survey related to your  clinical pharmacist.\"     To schedule another MTM appointment, please call the clinic directly or you may call the MTM scheduling line at [phone] or toll-free at [phone].     My Clinical Pharmacist's contact information:                                                      Please feel free to contact me with any questions or concerns you have.      Jake Ortega, PharmD  Medication Therapy Management (MTM) Pharmacist  [facility]       "

## 2022-05-19 RX ORDER — BETAMETHASONE DIPROPIONATE 0.5 MG/G
CREAM TOPICAL
COMMUNITY
Start: 2022-04-26 | End: 2023-03-30

## 2022-05-31 DIAGNOSIS — E78.00 PURE HYPERCHOLESTEROLEMIA: ICD-10-CM

## 2022-05-31 RX ORDER — ATORVASTATIN CALCIUM 40 MG/1
TABLET, FILM COATED ORAL
Qty: 90 TABLET | Refills: 0 | Status: CANCELLED | OUTPATIENT
Start: 2022-05-31

## 2022-06-01 RX ORDER — ATORVASTATIN CALCIUM 40 MG/1
TABLET, FILM COATED ORAL
Qty: 90 TABLET | Refills: 3 | Status: SHIPPED | OUTPATIENT
Start: 2022-06-01 | End: 2023-05-03

## 2022-06-02 NOTE — TELEPHONE ENCOUNTER
"Routing refill request to provider for review/approval because:  Labs not current:      Last Written Prescription Date:  2/17/22  Last Fill Quantity: 90,  # refills: 0   Last office visit provider:  2/18/22     Requested Prescriptions   Pending Prescriptions Disp Refills     atorvastatin (LIPITOR) 40 MG tablet 90 tablet 0     Sig: [ATORVASTATIN (LIPITOR) 40 MG TABLET] TAKE 1 TABLET(40 MG) BY MOUTH AT BEDTIME       Statins Protocol Failed - 5/31/2022 11:20 AM        Failed - LDL on file in past 12 months     Recent Labs   Lab Test 04/12/21  0740   LDL 46             Passed - No abnormal creatine kinase in past 12 months     No lab results found.             Passed - Recent (12 mo) or future (30 days) visit within the authorizing provider's specialty     Patient has had an office visit with the authorizing provider or a provider within the authorizing providers department within the previous 12 mos or has a future within next 30 days. See \"Patient Info\" tab in inbasket, or \"Choose Columns\" in Meds & Orders section of the refill encounter.              Passed - Medication is active on med list        Passed - Patient is age 18 or older             Nata Lewis RN 06/01/22 9:46 PM  "

## 2022-06-28 ENCOUNTER — OFFICE VISIT (OUTPATIENT)
Dept: PHARMACY | Facility: CLINIC | Age: 77
End: 2022-06-28
Payer: COMMERCIAL

## 2022-06-28 VITALS — HEART RATE: 91 BPM | DIASTOLIC BLOOD PRESSURE: 77 MMHG | SYSTOLIC BLOOD PRESSURE: 130 MMHG

## 2022-06-28 DIAGNOSIS — I25.810 CORONARY ARTERY DISEASE INVOLVING AUTOLOGOUS ARTERY CORONARY BYPASS GRAFT WITHOUT ANGINA PECTORIS: ICD-10-CM

## 2022-06-28 DIAGNOSIS — E55.9 VITAMIN D DEFICIENCY: ICD-10-CM

## 2022-06-28 DIAGNOSIS — E11.22 TYPE 2 DIABETES MELLITUS WITH CHRONIC KIDNEY DISEASE, WITH LONG-TERM CURRENT USE OF INSULIN, UNSPECIFIED CKD STAGE (H): Primary | ICD-10-CM

## 2022-06-28 DIAGNOSIS — Z79.4 TYPE 2 DIABETES MELLITUS WITH CHRONIC KIDNEY DISEASE, WITH LONG-TERM CURRENT USE OF INSULIN, UNSPECIFIED CKD STAGE (H): Primary | ICD-10-CM

## 2022-06-28 DIAGNOSIS — F32.A DEPRESSION, UNSPECIFIED DEPRESSION TYPE: ICD-10-CM

## 2022-06-28 PROCEDURE — 99607 MTMS BY PHARM ADDL 15 MIN: CPT | Performed by: PHARMACIST

## 2022-06-28 PROCEDURE — 99605 MTMS BY PHARM NP 15 MIN: CPT | Performed by: PHARMACIST

## 2022-06-28 RX ORDER — INSULIN DEGLUDEC INJECTION 100 U/ML
10 INJECTION, SOLUTION SUBCUTANEOUS DAILY
Qty: 15 ML | Refills: 1 | Status: SHIPPED | OUTPATIENT
Start: 2022-06-28 | End: 2022-07-19

## 2022-06-28 RX ORDER — SEMAGLUTIDE 1.34 MG/ML
0.5 INJECTION, SOLUTION SUBCUTANEOUS WEEKLY
Qty: 1.5 ML | Refills: 3 | Status: SHIPPED | OUTPATIENT
Start: 2022-06-28 | End: 2022-07-19

## 2022-06-28 NOTE — PROGRESS NOTES
Medication Therapy Management (MTM) Encounter    ASSESSMENT:                            Medication Adherence/Access: No concerns identified.     Itching: Using a new body wash and itching better.     Type 2 Diabetes: Last A1C at goal <7% - Reviewed that stomach upset may be related to higher dose of Ozempic. Will go back to previous dose of Ozempic and restart Tresiba as blood glucose was stable last month (the change was made to try to eliminate Tresiba for cost/convenience).      CAD: Last LDL at goal <70. BP at goal <130/80. Pulse at goal . Appropriately using high intensity statin and DAPT.     Has appointment scheduled with ENT for dizziness/vertigo symptoms.     Mood: PHQ9 above goal <5. No formal diagnosis of depression, but indicates depressive symptoms that may warrant treatment. Despite high PHQ9. patient denies being depressed, however would likely benefit from trial of Duloxetine to help with mood, knee pain, and potentially reduce urinary frequency. Declines at this time but will think about it.       Low Vitamin D: WNL with target goal of 45-60. Is using a supplement.        PLAN:                            1. Restart Tresiba 10 units. You have this pen at home so you can start tonight.   2. Restart Ozempic at 0.5 mg once weekly. Get in a dose for this week (the week of 6/26) by Thursday at the latest, then resume your regular Monday dosing.       Follow-up: Return in about 6 weeks (around 8/9/2022) for Medication Management Pharmacist, in person.    SUBJECTIVE/OBJECTIVE:                          Goyo Cole is a 76 year old male coming in for a follow-up visit. He was referred to me from Magalis Tucker PA-C. Patient was accompanied by wife.. Today's visit is a follow-up MTM visit from 05/18/2022.     Reason for visit: Referred for diabetes management.      Allergies/ADRs: Reviewed in chart  Past Medical History: Reviewed in chart  Tobacco: He reports that he has quit smoking. His  "smokeless tobacco use includes chew. Still using chewing tobacco - working on reducing.   Alcohol: Last drink was at [employer]. Sometimes, he'll go out and have a lot to drink.       Medication Adherence/Access: Cost concerns with Ozempic.  Patient's wife helps to set up with medications.     Sets all the vitamins in an old pill vial for the day. Adds in daily medicines.       Itching: Reports \"constant itching\" since January. In the stomach area, and lower left leg.     Saw a dermatologist who prescribed Augmented Betamethasone, but he hasn't used it. \"you use it for two weeks and the body part you put it on falls off\"     Using a different body wash now and itching has gotten better.       Type 2 Diabetes: Prescribed Metformin 500 mg 2 tabs twice daily, Pioglitazone 30 mg daily. At last visit, discontinued Tresiba and increased Ozempic to 1 mg weekly. Was having worsened stomach upset with the dose increase. Held 1 dose of Ozempic. Remained off Tresiba. The held dose was yesterday.      Wife reports that shortly after increasing the Ozempic having stomach pain and lots of nausea. Also having a cough that would cause him to gag.     Last day of stomach pain was about 2 days after Ozempic use.   Not having the cough. Not having since Saturday. Did have a headache. Still having dizzy spells. See below.     Not much appetite. Things that he used to like now taste strange. This was an issue reported at last visit - felt like he started to have this issue after quitting chewing tobacco.       Blood sugar monitoring: Continuous Glucose Monitor.                         Symptoms of low blood sugar? Dizziness   Symptoms of high blood sugar? none    Diet/Exercise: Sometimes has cravings for sweets and chocolates. Quit tobacco about a month ago, and not liking the taste of foods.  \"everything tastes clinical\"     Has noticed about 7-8# weight loss. Now fitting into 38 size jeans that he hasn't worn for a while.     Hemoglobin " "A1C   Date Value Ref Range Status   01/04/2022 7.4 (H) 0.0 - 5.6 % Final     Comment:     Normal <5.7%   Prediabetes 5.7-6.4%    Diabetes 6.5% or higher     Note: Adopted from ADA consensus guidelines.   07/09/2021 6.7 (H) <=5.6 % Final   04/12/2021 6.6 (H) <=5.6 % Final      Microalbumin Urine mg/dL   Date Value Ref Range Status   01/21/2021 1.79 0.00 - 1.99 mg/dL Final      No results found for: UCRR  LDL Cholesterol Calculated   Date Value Ref Range Status   04/12/2021 46 <=129 mg/dL Final     LDL Cholesterol Direct   Date Value Ref Range Status   02/13/2020 42 <=129 mg/dl Final     Creatinine   Date Value Ref Range Status   01/04/2022 1.48 (H) 0.70 - 1.30 mg/dL Final     GFR Estimate   Date Value Ref Range Status   01/04/2022 49 (L) >60 mL/min/1.73m2 Final     Comment:     Effective December 21, 2021 eGFRcr in adults is calculated using the 2021 CKD-EPI creatinine equation which includes age and gender (Dharmesh et al., NEJ, DOI: 10.1056/ZJVWji1011408)   07/09/2021 47 (L) >60 mL/min/1.73m2 Final     GFR Estimate If Black   Date Value Ref Range Status   07/09/2021 57 (L) >60 mL/min/1.73m2 Final         CAD: Admitted to [facility] on 1/5 with chest pain. 2 stents to LAD. Prescribed Metoprolol Tartrate 25 mg 0.5 tabs twice daily, Lisinopril 5mg daily, and Brillinta 90 mg twice daily. Continues Aspirin 81 mg daily, Atorvastatin 40 mg daily.     1/6 Echo at [facility] - EF 60%.     Patient with continued dizziness since last visit, worse with change in position (sitting to standing, etc). Describes the \"sensation of being drunk\".     Was scheduled with Neurology in early May, but had to be rescheduled and wasn't able to get in until October.     Saw ENT - was supposed to have vestibular testing. Got an arm BP cuff. Was really sweaty. blood glucose was 158. BP was 113/56 pulse 107.     That afternoon feeling dizzy 112/66 pulse 99     6/26 - no issues. 116/67. Pulse 68.     Last Friday, couldn't put the drain pipes back on. "     Recent Labs   Lab Test 04/12/21  0740 02/13/20  1133 06/01/18  1613 02/06/18  0818   CHOL 95  --   --  105   HDL 33*  --   --  35*   LDL 46 42   < > 49   TRIG 82  --   --  103    < > = values in this interval not displayed.       BP Readings from Last 3 Encounters:   06/28/22 130/77   05/18/22 126/69   04/13/22 121/71      Pulse Readings from Last 3 Encounters:   06/28/22 91   05/18/22 74   04/13/22 92       Mood: Not currently on medications     Felt dizzy when riding the . Worries about safety of being able to ride his motorcycle. This has been hard on his mood.     PHQ-9 score:    PHQ 3/8/2022   PHQ-9 Total Score 10   Q9: Thoughts of better off dead/self-harm past 2 weeks Not at all         Low Vitamin D: Prescribed Vitamin D3 2000 units daily .     Vitamin D Deficiency Screening Results:  Lab Results   Component Value Date    VITDT 56 03/08/2022         Knee Pain: Not using Diclofenac 1% gel - has it. Used it once. Didn't find it helpful.     Reports his pain is off and on. Typically worse at night. Doesn't bother him while sleep. Sometimes with activity, wears a brace.       Today's Vitals: /77   Pulse 91   ----------------    I spent 60 minutes with this patient today. All changes were made via collaborative practice agreement with Magalis Tucker PA-C. A copy of the visit note was provided to the patient's provider(s).    The patient was given a summary of these recommendations.     Jake Ortega, JuanpabloD  Medication Therapy Management (MTM) Pharmacist  [facility]       Medication Therapy Recommendations  Type 2 diabetes mellitus with kidney complication, with long-term current use of insulin (H)    Current Medication: metFORMIN (GLUCOPHAGE) 500 MG tablet   Rationale: Synergistic therapy - Needs additional medication therapy - Indication   Recommendation: Start Medication - Tresiba 100 UNIT/ML Soln   Status: Accepted per CPA          Current Medication:  semaglutide (OZEMPIC, 0.25 OR 0.5 MG/DOSE,) 2 MG/1.5ML SOPN pen   Rationale: Undesirable effect - Adverse medication event - Safety   Recommendation: Decrease Dose   Status: Accepted per CPA

## 2022-06-28 NOTE — Clinical Note
Pt has 2 visits scheduled on 7/12 and 7/14. The 7/12 is medicare requirement for continued CGM. Pt later felt he needed a full physical so she scheduled 7/14.   Preference would be to have the physical on 7/12 - if you're able to do the physical that day, I can take care of medicare paperwork for CGM.   Please have MA staff call pt/wife to inform which appt is getting cancelled.

## 2022-06-28 NOTE — PATIENT INSTRUCTIONS
"Recommendations from today's MTM visit:                                                       Restart Tresiba 10 units. You have this pen at home so you can start tonight.   Restart Ozempic at 0.5 mg once weekly. Get in a dose for this week (the week of 6/26) by Thursday at the latest, then resume your regular Monday dosing.     Follow-up: Return in about 4 weeks (around 7/26/2022) for Medication Management Pharmacist, in person.    It was great speaking with you today.  I value your experience and would be very thankful for your time in providing feedback in our clinic survey. In the next few days, you may receive an email or text message from [employer] with a link to a survey related to your  clinical pharmacist.\"     To schedule another MTM appointment, please call the clinic directly or you may call the MTM scheduling line at [phone] or toll-free at [phone].     My Clinical Pharmacist's contact information:                                                      Please feel free to contact me with any questions or concerns you have.      Jake Ortega, PharmD  Medication Therapy Management (MTM) Pharmacist  [facility]      "

## 2022-07-12 ENCOUNTER — OFFICE VISIT (OUTPATIENT)
Dept: FAMILY MEDICINE | Facility: CLINIC | Age: 77
End: 2022-07-12
Payer: MEDICARE

## 2022-07-12 VITALS
WEIGHT: 258 LBS | RESPIRATION RATE: 16 BRPM | HEIGHT: 74 IN | HEART RATE: 83 BPM | DIASTOLIC BLOOD PRESSURE: 73 MMHG | BODY MASS INDEX: 33.11 KG/M2 | SYSTOLIC BLOOD PRESSURE: 121 MMHG

## 2022-07-12 DIAGNOSIS — Z00.00 ENCOUNTER FOR MEDICARE ANNUAL WELLNESS EXAM: Primary | ICD-10-CM

## 2022-07-12 DIAGNOSIS — E11.9 TYPE 2 DIABETES MELLITUS WITHOUT COMPLICATION, WITH LONG-TERM CURRENT USE OF INSULIN (H): ICD-10-CM

## 2022-07-12 DIAGNOSIS — E11.9 TYPE 2 DIABETES MELLITUS WITHOUT COMPLICATION, WITHOUT LONG-TERM CURRENT USE OF INSULIN (H): ICD-10-CM

## 2022-07-12 DIAGNOSIS — Z79.4 TYPE 2 DIABETES MELLITUS WITH CHRONIC KIDNEY DISEASE, WITH LONG-TERM CURRENT USE OF INSULIN, UNSPECIFIED CKD STAGE (H): ICD-10-CM

## 2022-07-12 DIAGNOSIS — Z11.59 NEED FOR HEPATITIS C SCREENING TEST: ICD-10-CM

## 2022-07-12 DIAGNOSIS — E11.22 TYPE 2 DIABETES MELLITUS WITH CHRONIC KIDNEY DISEASE, WITH LONG-TERM CURRENT USE OF INSULIN, UNSPECIFIED CKD STAGE (H): ICD-10-CM

## 2022-07-12 DIAGNOSIS — I10 ESSENTIAL HYPERTENSION: ICD-10-CM

## 2022-07-12 DIAGNOSIS — R42 DIZZINESS: ICD-10-CM

## 2022-07-12 DIAGNOSIS — N28.9 DISORDER OF KIDNEY AND URETER: ICD-10-CM

## 2022-07-12 DIAGNOSIS — R10.13 DYSPEPSIA: ICD-10-CM

## 2022-07-12 DIAGNOSIS — Z79.4 TYPE 2 DIABETES MELLITUS WITHOUT COMPLICATION, WITH LONG-TERM CURRENT USE OF INSULIN (H): ICD-10-CM

## 2022-07-12 PROBLEM — Z95.5 STENTED CORONARY ARTERY: Status: ACTIVE | Noted: 2022-01-07

## 2022-07-12 PROBLEM — I25.10 ATHEROSCLEROSIS OF NATIVE CORONARY ARTERY OF NATIVE HEART WITHOUT ANGINA PECTORIS: Status: ACTIVE | Noted: 2022-02-07

## 2022-07-12 PROBLEM — I05.0 MITRAL VALVE STENOSIS: Status: ACTIVE | Noted: 2022-02-07

## 2022-07-12 LAB
ANION GAP SERPL CALCULATED.3IONS-SCNC: 9 MMOL/L (ref 7–15)
BUN SERPL-MCNC: 22.6 MG/DL (ref 8–23)
CALCIUM SERPL-MCNC: 9.4 MG/DL (ref 8.8–10.2)
CHLORIDE SERPL-SCNC: 98 MMOL/L (ref 98–107)
CREAT SERPL-MCNC: 1.43 MG/DL (ref 0.67–1.17)
DEPRECATED HCO3 PLAS-SCNC: 25 MMOL/L (ref 22–29)
GFR SERPL CREATININE-BSD FRML MDRD: 51 ML/MIN/1.73M2
GLUCOSE SERPL-MCNC: 204 MG/DL (ref 70–99)
HBA1C MFR BLD: 8.5 % (ref 0–5.6)
POTASSIUM SERPL-SCNC: 4.7 MMOL/L (ref 3.4–5.3)
SODIUM SERPL-SCNC: 132 MMOL/L (ref 136–145)

## 2022-07-12 PROCEDURE — 91306 COVID-19,PF,MODERNA (18+ YRS BOOSTER .25ML): CPT | Performed by: PHYSICIAN ASSISTANT

## 2022-07-12 PROCEDURE — 36415 COLL VENOUS BLD VENIPUNCTURE: CPT | Performed by: PHYSICIAN ASSISTANT

## 2022-07-12 PROCEDURE — 80048 BASIC METABOLIC PNL TOTAL CA: CPT | Performed by: PHYSICIAN ASSISTANT

## 2022-07-12 PROCEDURE — 99397 PER PM REEVAL EST PAT 65+ YR: CPT | Mod: 25 | Performed by: PHYSICIAN ASSISTANT

## 2022-07-12 PROCEDURE — 99207 PR FOOT EXAM NO CHARGE: CPT | Mod: 25 | Performed by: PHYSICIAN ASSISTANT

## 2022-07-12 PROCEDURE — 83036 HEMOGLOBIN GLYCOSYLATED A1C: CPT | Performed by: PHYSICIAN ASSISTANT

## 2022-07-12 PROCEDURE — 0064A COVID-19,PF,MODERNA (18+ YRS BOOSTER .25ML): CPT | Performed by: PHYSICIAN ASSISTANT

## 2022-07-12 ASSESSMENT — ENCOUNTER SYMPTOMS
HEADACHES: 0
CONSTIPATION: 1
ARTHRALGIAS: 1
HEMATOCHEZIA: 0
FREQUENCY: 0
PARESTHESIAS: 0
DYSURIA: 0
HEMATURIA: 0
PALPITATIONS: 0
ABDOMINAL PAIN: 1
MYALGIAS: 1
EYE PAIN: 0
CHILLS: 1
COUGH: 1
DIARRHEA: 1
FEVER: 0
WEAKNESS: 1
JOINT SWELLING: 0
HEARTBURN: 1
SORE THROAT: 0
SHORTNESS OF BREATH: 0
NERVOUS/ANXIOUS: 0
NAUSEA: 1
DIZZINESS: 1

## 2022-07-12 ASSESSMENT — PATIENT HEALTH QUESTIONNAIRE - PHQ9
SUM OF ALL RESPONSES TO PHQ QUESTIONS 1-9: 5
SUM OF ALL RESPONSES TO PHQ QUESTIONS 1-9: 5
10. IF YOU CHECKED OFF ANY PROBLEMS, HOW DIFFICULT HAVE THESE PROBLEMS MADE IT FOR YOU TO DO YOUR WORK, TAKE CARE OF THINGS AT HOME, OR GET ALONG WITH OTHER PEOPLE: NOT DIFFICULT AT ALL

## 2022-07-12 ASSESSMENT — ACTIVITIES OF DAILY LIVING (ADL): CURRENT_FUNCTION: NO ASSISTANCE NEEDED

## 2022-07-12 NOTE — PATIENT INSTRUCTIONS
Patient Education   Personalized Prevention Plan  You are due for the preventive services outlined below.  Your care team is available to assist you in scheduling these services.  If you have already completed any of these items, please share that information with your care team to update in your medical record.  Health Maintenance Due   Topic Date Due     Discuss Advance Care Planning  Never done     Hepatitis C Screening  Never done     Zoster (Shingles) Vaccine (1 of 2) Never done     Kidney Microalbumin Urine Test  01/21/2022     COVID-19 Vaccine (4 - Booster for Moderna series) 03/09/2022     Cholesterol Lab  04/12/2022     A1C Lab  07/04/2022     Diabetic Foot Exam  07/10/2022       Exercise for a Healthier Heart  You may wonder how you can improve the health of your heart. If you re thinking about exercise, you re on the right track. You don t need to become an athlete. But you do need a certain amount of brisk exercise to help strengthen your heart. If you have been diagnosed with a heart condition, your healthcare provider may advise exercise to help stabilize your condition. To help make exercise a habit, choose safe, fun activities.      Exercise with a friend. When activity is fun, you're more likely to stick with it.   Before you start  Check with your healthcare provider before starting an exercise program. This is especially important if you have not been active for a while. It's also important if you have a long-term (chronic) health problem such as heart disease, diabetes, or obesity. Or if you are at high risk for having these problems.   Why exercise?  Exercising regularly offers many healthy rewards. It can help you do all of the following:     Improve your blood cholesterol level to help prevent further heart trouble    Lower your blood pressure to help prevent a stroke or heart attack    Control diabetes, or reduce your risk of getting this disease    Improve your heart and lung  function    Reach and stay at a healthy weight    Make your muscles stronger so you can stay active    Prevent falls and fractures by slowing the loss of bone mass (osteoporosis)    Manage stress better    Reduce your blood pressure    Improve your sense of self and your body image  Exercise tips      Ease into your routine. Set small goals. Then build on them. If you are not sure what your activity level should be, talk with your healthcare provider first before starting an exercise routine.    Exercise on most days. Aim for a total of 150 minutes (2 hours and 30 minutes) or more of moderate-intensity aerobic activity each week. Or 75 minutes (1 hour and 15 minutes) or more of vigorous-intensity aerobic activity each week. Or try for a combination of both. Moderate activity means that you breathe heavier and your heart rate increases but you can still talk. Think about doing 40 minutes of moderate exercise, 3 to 4 times a week. For best results, activity should last for about 40 minutes to lower blood pressure and cholesterol. It's OK to work up to the 40-minute period over time. Examples of moderate-intensity activity are walking 1 mile in 15 minutes. Or doing 30 to 45 minutes of yard work.    Step up your daily activity level.  Along with your exercise program, try being more active the whole day. Walk instead of drive. Or park further away so that you take more steps each day. Do more household tasks or yard work. You may not be able to meet the advised mount of physical activity. But doing some moderate- or vigorous-intensity aerobic activity can help reduce your risk for heart disease. Your healthcare provider can help you figure out what is best for you.    Choose 1 or more activities you enjoy.  Walking is one of the easiest things you can do. You can also try swimming, riding a bike, dancing, or taking an exercise class.    When to call your healthcare provider  Call your healthcare provider if you have any  of these:     Chest pain or feel dizzy or lightheaded    Burning, tightness, pressure, or heaviness in your chest, neck, shoulders, back, or arms    Abnormal shortness of breath    More joint or muscle pain    A very fast or irregular heartbeat (palpitations)  [employer] last reviewed this educational content on 7/1/2019 2000-2021 [employer]. All rights reserved. This information is not intended as a substitute for professional medical care. Always follow your healthcare professional's instructions.          Signs of Hearing Loss      Hearing much better with one ear can be a sign of hearing loss.   Hearing loss is a problem shared by many people. In fact, it is one of the most common health problems, particularly as people age. Most people age 65 and older have some hearing loss. By age 80, almost everyone does. Hearing loss often occurs slowly over the years. So you may not realize your hearing has gotten worse.  Have your hearing checked  Call your healthcare provider if you:    Have to strain to hear normal conversation    Have to watch other people s faces very carefully to follow what they re saying    Need to ask people to repeat what they ve said    Often misunderstand what people are saying    Turn the volume of the television or radio up so high that others complain    Feel that people are mumbling when they re talking to you    Find that the effort to hear leaves you feeling tired and irritated    Notice, when using the phone, that you hear better with one ear than the other  StayWell last reviewed this educational content on 1/1/2020 2000-2021 [employer]. All rights reserved. This information is not intended as a substitute for professional medical care. Always follow your healthcare professional's instructions.          Depression and Suicide in Older Adults    Nearly 2 million older Americans have some type of depression. Some of them even take their own lives. Yet  "depression among older adults is often ignored. Learn the warning signs. You may help spare a loved one needless pain. You may also save a life.   What is depression?  Depression is a common and serious illness that affects the way you think and feel. It is not a normal part of aging, nor is it a sign of weakness, a character flaw, or something you can snap out of. Most people with depression need treatment to get better. The most common symptom is a feeling of deep sadness. People who are depressed also may seem tired and listless. And nothing seems to give them pleasure. It s normal to grieve or be sad sometimes. But sadness lessens or passes with time. Depression rarely goes away or improves on its own. A person with clinical depression can't \"snap out of it.\" Other symptoms of depression are:     Sleeping more or less than normal    Eating more or less than normal    Having headaches, stomachaches, or other pains that don t go away    Feeling nervous,  empty,  or worthless    Crying a great deal    Thinking or talking about suicide or death    Loss of interest in activities previously enjoyed    Social isolation    Feeling confused or forgetful  What causes it?  The causes of depression aren t fully known. But it is thought to result from a complex blend of these factors:     Biochemistry. Certain chemicals in the brain play a role.    Genes. Depression does run in families.    Life stress. Life stresses can also trigger depression in some people. Older adults often face many stressors, such as death of friends or a spouse, health problems, and financial concerns.    Chronic conditions. This includes conditions such as diabetes, heart disease, or cancer. These can cause symptoms of depression. Medicine side effects can cause changes in thoughts and behaviors.  How you can help  Often, depressed people may not want to ask for help. When they do, they may be ignored. Or, they may receive the wrong treatment. You " can help by showing parents and older friends love and support. If they seem depressed, don t lecture the person, ignore the symptoms, or discount the symptoms as a  normal  part of aging -which they are not. Get involved, listen, and show interest and support.   Help them understand that depression is a treatable illness. Tell them you can help them find the right treatment. Offer to go to their healthcare provider's appointment with them for support when the symptoms are discussed. With their approval, contact a local mental health center, social service agency, or hospital about services.   You can be an advocate for him or her at healthcare appointments. Many older adults have chronic illnesses that can cause symptoms of depression. Medicine side effects can change thoughts and behaviors. You can help make sure that the healthcare provider looks at all of these factors. He or she should refer your family member or friend to a mental healthcare provider when needed. in some cases, untreated depression can lead to a misdiagnosis. A person may be diagnosed with a brain disorder such as dementia. If the healthcare provider does not take the issue of depression seriously, help your family member or friend to find another provider.   Don't be afraid to ask  If you think an older person you care about could be suicidal, ask,  Have you thought about suicide?  Most people will tell you the truth. If they say  yes,  they may already have a plan for how and when they will attempt it. Find out as much as you can. The more detailed the plan, and the easier it is to carry out, the more danger the person is in right now. Tell the person you are there for them and do not want them to harm him or herself. Don't wait to get help for the person. Call the person's healthcare provider, local hospital, or emergency services.   To learn more    National Suicide Prevention Lifeline (crisis hotline) 695-702-HURV  (925.215.6845)    National [place] of Mental Xmxtvo174-289-5928fdt.[facility].nih.gov    National [place] on Mental Jwhydkw209-971-6646vsw.neel.org    Mental Health Rwgmhcp917-690-9170rjo.[facility].org    National Suicide Heymzmw109-FDMCMQU (964-615-8129)    Call 911  Never leave the person alone. A person who is actively suicidal needs psychiatric care right away. They will need constant supervision. Never leave the person out of sight. Call 911 or the [facility] 24-hour suicide crisis hotline at 256-484-RPAP (744-628-9207). You can also take the person to the closest emergency room.   Goyo last reviewed this educational content on 5/1/2020 2000-2021 [employer]. All rights reserved. This information is not intended as a substitute for professional medical care. Always follow your healthcare professional's instructions.

## 2022-07-12 NOTE — PROGRESS NOTES
He is at risk for lack of exercise and has been provided with information to increase physical activity for the benefit of his well-being.  The patient was provided with written information regarding signs of hearing loss.  The patient's PHQ-9 score is consistent with mild depression. He was provided with information regarding depression and was advised to schedule a follow up appointment in 4 weeks to further address this issue.

## 2022-07-12 NOTE — PROGRESS NOTES
"SUBJECTIVE:   Goyo Cole is a 76 year old male who presents for Preventive Visit.      Patient has been advised of split billing requirements and indicates understanding: Yes  Are you in the first 12 months of your Medicare coverage?  No    Continuing to have a lot of GI symptoms Ozempic.  Burping, diarrhea.  Hard for him to leave his house in the morning because of the symptoms.  Seemed to all start once he started the Ozempic at the beginning of this year.  Decreasing the dose of Ozempic has improved his symptoms a little bit.  He is not interested in starting duloxetine.  I reviewed his most recent pharmacy note.  Ozempic dose decreased about 2 weeks ago.  Still having the chronic dizziness.  Neuro appointment was canceled without his knowledge, and then now he cannot get in to be seen till October.  In the meantime, he is seeing ENT to be assessed for vestibular issues.  Continues to have LUTS.    Sometimes he has the urge to urinate and then nothing comes out.  He does limit his water intake right before bed and that does seem to limit his waking up at night to urinate to only once a night.      Quit chewing tobacco 3 months ago.  Needed to have this visit in part for insurance purposes to approve his glucometer.  Two most bothersome symptoms right now are dizziness and GI issues.      Healthy Habits:     In general, how would you rate your overall health?  Good    Frequency of exercise:  None    Do you usually eat at least 4 servings of fruit and vegetables a day, include whole grains    & fiber and avoid regularly eating high fat or \"junk\" foods?  Yes    Taking medications regularly:  Yes    Medication side effects:  Lightheadedness    Ability to successfully perform activities of daily living:  No assistance needed    Home Safety:  No safety concerns identified    Hearing Impairment:  Difficulty following a conversation in a noisy restaurant or crowded room    In the past 6 months, have you been bothered " by leaking of urine?  No    In general, how would you rate your overall mental or emotional health?  Good      PHQ-2 Total Score: 2    Additional concerns today:  No    Do you feel safe in your environment? Yes    Fall risk  Fallen 2 or more times in the past year?: No  Any fall with injury in the past year?: No    Cognitive Screening   1) Repeat 3 items (Leader, Season, Table)   2) Clock draw: NORMAL  3) 3 item recall: recall 2 words Recalls 2 objects   Results: NORMAL clock, 1-2 items recalled: COGNITIVE IMPAIRMENT LESS LIKELY    Mini-CogTM Copyright SHAUN Huizar. Licensed by the author for use in [facility]; reprinted with permission (john@[facility]). All rights reserved.      Do you have sleep apnea, excessive snoring or daytime drowsiness?: yes    Reviewed and updated as needed this visit by clinical staff   Tobacco  Allergies                 Reviewed and updated as needed this visit by Provider                   Social History     Tobacco Use     Smoking status: Former Smoker     Smokeless tobacco: Current User     Types: Chew     Last attempt to quit: 1/1/2016   Substance Use Topics     Alcohol use: Yes     Alcohol/week: 7.0 standard drinks     Comment: Alcoholic Drinks/day: once a week       Alcohol Use 7/12/2022   Prescreen: >3 drinks/day or >7 drinks/week? No     Current providers sharing in care for this patient include:   Patient Care Team:  Magalis Tucker PA-C as PCP - General  Magalis Tucker PA-C as Assigned PCP  Jake Ortega, PharmD as Pharmacist (Pharmacist)  Chris Denny DO as MD (Neurology)  Jake Ortega, PharmD as Assigned MTM Pharmacist    The following health maintenance items are reviewed in Epic and correct as of today:  Health Maintenance Due   Topic Date Due     ANNUAL REVIEW OF HM ORDERS  Never done     ADVANCE CARE PLANNING  Never done     HEPATITIS C SCREENING  Never done     ZOSTER IMMUNIZATION (1 of 2) Never done     MEDICARE ANNUAL  "WELLNESS VISIT  Never done     MICROALBUMIN  01/21/2022     COVID-19 Vaccine (4 - Booster for Moderna series) 03/09/2022     LIPID  04/12/2022     A1C  07/04/2022     DIABETIC FOOT EXAM  07/10/2022     Current Outpatient Medications   Medication Sig Dispense Refill     Continuous Blood Gluc  (FREESTYLE ESTRADA 14 DAY READER) LAURENCE 1 Application every 14 days 2 each 11     Continuous Blood Gluc Sensor (FREESTYLE ESTRADA 14 DAY SENSOR) MISC 1 each every 14 days 6 each 11     acetaminophen (TYLENOL) 500 MG tablet Take 1,000 mg by mouth every 6 hours        aspirin 81 MG EC tablet [ASPIRIN 81 MG EC TABLET] Take 81 mg by mouth daily.       atorvastatin (LIPITOR) 40 MG tablet [ATORVASTATIN (LIPITOR) 40 MG TABLET] TAKE 1 TABLET(40 MG) BY MOUTH AT BEDTIME 90 tablet 3     augmented betamethasone dipropionate (DIPROLENE AF) 0.05 % external cream APPLY TO AFFECTED AREA ON THE BODY 1-2X DAILY FOR 2 WEEKS TAKE 1 WEEK BREAK REPEAT AS NEEDED FOR FLARES       BD EMMETT U/F 32G X 4 MM insulin pen needle [BD ULTRA-FINE EMMETT PEN NEEDLE 32 GAUGE X 5/32\" NDLE] USE AS DIRECTED 300 each 2     calcium citrate-vitamin D (CITRACAL) 315-200 MG-UNIT TABS per tablet Take 2 tablets by mouth 2 times daily       cholecalciferol, vitamin D3, (CHOLECALCIFEROL) 1,000 unit tablet Take 2,000 Units by mouth daily       CONTOUR NEXT TEST STRIPS strips [CONTOUR NEXT TEST STRIPS STRIPS] CHECK BLOOD SUGAR BEFORE MEALS AND AT BEDTIME 120 strip 10     insulin aspart (NOVOLOG FLEXPEN) 100 UNIT/ML pen Inject 5 Units Subcutaneous 3 times daily (with meals) 15 mL      Insulin Degludec (TRESIBA) 100 UNIT/ML SOLN Inject 20 Units Subcutaneous daily 15 mL 1     lisinopril (ZESTRIL) 5 MG tablet Take 5 mg by mouth daily       metFORMIN (GLUCOPHAGE) 500 MG tablet [METFORMIN (GLUCOPHAGE) 500 MG TABLET] TAKE 2 TABLETS(1000 MG) BY MOUTH TWICE DAILY WITH MEALS 360 tablet 3     metoprolol tartrate (LOPRESSOR) 25 MG tablet Take 12.5 mg by mouth       MICROLET LANCET " "[MICROLET LANCET] USE TO CHECK BLOOD SUGAR QID as needed 100 each 3     miscellaneous medical supply (BLOOD PRESSURE CUFF) Misc [MISCELLANEOUS MEDICAL SUPPLY (BLOOD PRESSURE CUFF) MISC] Please provide patient with automatic blood pressure cuff.  Diagnosis: hypertension 1 each 0     mometasone (NASONEX) 50 MCG/ACT nasal spray Spray 2 sprays into both nostrils daily 17 g 1     omeprazole 20 MG tablet Take 20 mg by mouth daily       pioglitazone (ACTOS) 30 MG tablet Take 1 tablet (30 mg) by mouth daily 90 tablet 3     SENNA-docusate sodium (SENNA S) 8.6-50 MG tablet Take 1 tablet by mouth nightly as needed       ticagrelor (BRILINTA) 90 MG tablet Take 90 mg by mouth every 12 hours       Review of Systems   Constitutional: Positive for chills. Negative for fever.   HENT: Positive for congestion. Negative for ear pain, hearing loss and sore throat.    Eyes: Negative for pain and visual disturbance.   Respiratory: Positive for cough. Negative for shortness of breath.    Cardiovascular: Negative for chest pain, palpitations and peripheral edema.   Gastrointestinal: Positive for abdominal pain, constipation, diarrhea, heartburn and nausea. Negative for hematochezia.   Genitourinary: Negative for dysuria, frequency, genital sores, hematuria, impotence and urgency.   Musculoskeletal: Positive for arthralgias and myalgias. Negative for joint swelling.   Skin: Negative for rash.   Neurological: Positive for dizziness and weakness. Negative for headaches and paresthesias.   Psychiatric/Behavioral: Positive for mood changes. The patient is not nervous/anxious.        OBJECTIVE:   /73 (BP Location: Left arm, Patient Position: Sitting, Cuff Size: Adult Large)   Pulse 83   Resp 16   Ht 1.88 m (6' 2.02\")   Wt 117 kg (258 lb)   BMI 33.11 kg/m   Estimated body mass index is 33.11 kg/m  as calculated from the following:    Height as of this encounter: 1.88 m (6' 2.02\").    Weight as of this encounter: 117 kg (258 " lb).  Physical Exam  GENERAL: healthy, alert and no distress  EYES: Eyes grossly normal to inspection, PERRL and conjunctivae and sclerae normal  HENT: ear canals and TM's normal, nose and mouth without ulcers or lesions  NECK: no adenopathy, no asymmetry, masses, or scars and thyroid normal to palpation  RESP: lungs clear to auscultation - no rales, rhonchi or wheezes  CV: regular rate and rhythm, normal S1 S2, no S3 or S4, no murmur, click or rub, no peripheral edema and peripheral pulses strong  ABDOMEN: soft, nontender, no hepatosplenomegaly, no masses and bowel sounds normal  MS: no gross musculoskeletal defects noted, no edema  SKIN: no suspicious lesions or rashes  NEURO: Normal strength and tone, mentation intact and speech normal  PSYCH: mentation appears normal, affect normal/bright    Today's labs pending.    ASSESSMENT / PLAN:   1. Encounter for Medicare annual wellness exam  Health maintenance discussed with patient as appropriate for age and risk factors.    2. Type 2 diabetes mellitus with chronic kidney disease, with long-term current use of insulin, unspecified CKD stage (H)  A1c: 8.5%, increased from 7.4% 6 months ago  Eye Exam: UTD  Foot Exam: done today, normal  Smoking: No  On a statin: yes  Blood Pressure: controlled  Microalbumin: will get at next visit  On ACE inhibitor: yes  Continue to work with {harmacist to get good blood sugar control while minimizing GI side effects.  Continue using continuous glucose monitoring, as this is very helpful to his blood sugar management.  - Hemoglobin A1c  - Basic metabolic panel  - FOOT EXAM    3. Essential hypertension  Normal BP, continue present medications.  - Basic metabolic panel    4. Renal Insufficiency  Screening labs ordered today, stable.    5. Dyspepsia  Possibly related to Ozempic?  Working with Pharmacist to adjust medications.  Improved a little.    6. Dizziness  Can't get in to see Neuro for several months.  Seeing ENT right now, trying  "vestibular rehab.  Continue.  If he fails vestibular treatment, consider contacting Neuro to try to get him seen sooner if possible, depending on how he is feeling.    ADDENDUM:  Insulin regimen includes Tresiba 20 Units daily.  Novolog 5 Units 3 times daily with meals.    COUNSELING:  Reviewed preventive health counseling, as reflected in patient instructions    Estimated body mass index is 33.11 kg/m  as calculated from the following:    Height as of this encounter: 1.88 m (6' 2.02\").    Weight as of this encounter: 117 kg (258 lb).      He reports that he has quit smoking. His smokeless tobacco use includes chew.      Appropriate preventive services were discussed with this patient, including applicable screening as appropriate for cardiovascular disease, diabetes, osteopenia/osteoporosis, and glaucoma.  As appropriate for age/gender, discussed screening for colorectal cancer, prostate cancer, breast cancer, and cervical cancer. Checklist reviewing preventive services available has been given to the patient.      Counseling Resources:  ATP IV Guidelines  Pooled Cohorts Equation Calculator  Breast Cancer Risk Calculator  Breast Cancer: Medication to Reduce Risk  FRAX Risk Assessment  ICSI Preventive Guidelines  Dietary Guidelines for Americans, 2010  [employer]'s MyPlate  ASA Prophylaxis  Lung CA Screening    EVELIA Menard [facility]    Identified Health Risks:  Answers for HPI/ROS submitted by the patient on 7/12/2022  If you checked off any problems, how difficult have these problems made it for you to do your work, take care of things at home, or get along with other people?: Not difficult at all  PHQ9 TOTAL SCORE: 5      "

## 2022-07-13 ENCOUNTER — TELEPHONE (OUTPATIENT)
Dept: FAMILY MEDICINE | Facility: CLINIC | Age: 77
End: 2022-07-13

## 2022-07-17 RX ORDER — FLASH GLUCOSE SENSOR
1 KIT MISCELLANEOUS
Qty: 6 EACH | Refills: 11 | Status: SHIPPED | OUTPATIENT
Start: 2022-07-17 | End: 2022-07-30

## 2022-07-17 RX ORDER — FLASH GLUCOSE SCANNING READER
1 EACH MISCELLANEOUS
Qty: 2 EACH | Refills: 11 | Status: SHIPPED | OUTPATIENT
Start: 2022-07-17 | End: 2022-07-30

## 2022-07-18 ENCOUNTER — MYC MEDICAL ADVICE (OUTPATIENT)
Dept: PHARMACY | Facility: CLINIC | Age: 77
End: 2022-07-18

## 2022-07-18 DIAGNOSIS — Z79.4 TYPE 2 DIABETES MELLITUS WITH CHRONIC KIDNEY DISEASE, WITH LONG-TERM CURRENT USE OF INSULIN, UNSPECIFIED CKD STAGE (H): ICD-10-CM

## 2022-07-18 DIAGNOSIS — E11.22 TYPE 2 DIABETES MELLITUS WITH CHRONIC KIDNEY DISEASE, WITH LONG-TERM CURRENT USE OF INSULIN, UNSPECIFIED CKD STAGE (H): ICD-10-CM

## 2022-07-19 RX ORDER — INSULIN DEGLUDEC INJECTION 100 U/ML
20 INJECTION, SOLUTION SUBCUTANEOUS DAILY
Qty: 15 ML | Refills: 1
Start: 2022-07-19 | End: 2022-10-19

## 2022-07-19 RX ORDER — INSULIN ASPART 100 [IU]/ML
5 INJECTION, SOLUTION INTRAVENOUS; SUBCUTANEOUS
Qty: 15 ML
Start: 2022-07-19 | End: 2022-08-09

## 2022-07-22 ENCOUNTER — TELEPHONE (OUTPATIENT)
Dept: FAMILY MEDICINE | Facility: CLINIC | Age: 77
End: 2022-07-22

## 2022-07-22 DIAGNOSIS — E11.9 TYPE 2 DIABETES MELLITUS WITHOUT COMPLICATION, WITH LONG-TERM CURRENT USE OF INSULIN (H): ICD-10-CM

## 2022-07-22 DIAGNOSIS — Z79.4 TYPE 2 DIABETES MELLITUS WITHOUT COMPLICATION, WITH LONG-TERM CURRENT USE OF INSULIN (H): ICD-10-CM

## 2022-07-22 DIAGNOSIS — E11.9 TYPE 2 DIABETES MELLITUS WITHOUT COMPLICATION, WITHOUT LONG-TERM CURRENT USE OF INSULIN (H): ICD-10-CM

## 2022-07-22 NOTE — TELEPHONE ENCOUNTER
Hello,    This is [facility] following up on our previous request for Addended Visit Notes.  Both the fax we received and the Epic visit we can see from 7/12/22 do not have an addendum present in them that includes the patient's insulin regimen, which will be needed to extend the Medicare coverage for this patient's Freestyle Rowan 14 day sensors.  Unfortunately we cannot use medication lists, which is the only place in the visit that mentions insulin.      We are requesting that the insulin regimen be added to this visit in the notes portion of the visit and we are also requesting a new Freestyle Rowan 14 day sensor prescription dated on or after the 7/12/22 visit for Medicare compliance.    If you have any questions about these requests please call us at [phone].    Thank you,    [facility] - DCS Team

## 2022-07-27 RX ORDER — FLASH GLUCOSE SENSOR
1 KIT MISCELLANEOUS
Qty: 6 EACH | Refills: 11 | Status: CANCELLED | OUTPATIENT
Start: 2022-07-27

## 2022-07-27 RX ORDER — FLASH GLUCOSE SCANNING READER
1 EACH MISCELLANEOUS
Qty: 2 EACH | Refills: 11 | Status: CANCELLED | OUTPATIENT
Start: 2022-07-27

## 2022-07-27 NOTE — TELEPHONE ENCOUNTER
Pharmacy called back to check on the status of the Rowan refill. Please send refill to pharmacy on file. Please check previous message for clarification. Also pharmacy stated that pt is completely out of this.

## 2022-07-28 NOTE — TELEPHONE ENCOUNTER
I guess this needs to wait for Magalis for the addendum since I cannot alter her notes  Could it come from a pharmacist?

## 2022-07-30 RX ORDER — FLASH GLUCOSE SCANNING READER
1 EACH MISCELLANEOUS
Qty: 2 EACH | Refills: 11 | Status: SHIPPED | OUTPATIENT
Start: 2022-07-30

## 2022-07-30 RX ORDER — FLASH GLUCOSE SENSOR
1 KIT MISCELLANEOUS
Qty: 6 EACH | Refills: 11 | Status: SHIPPED | OUTPATIENT
Start: 2022-07-30 | End: 2022-08-02

## 2022-08-01 ENCOUNTER — TELEPHONE (OUTPATIENT)
Dept: NURSING | Facility: CLINIC | Age: 77
End: 2022-08-01

## 2022-08-01 NOTE — TELEPHONE ENCOUNTER
Patient's spouse Cate calling regarding the refill of the Freestyle andrew sensors. Reviewed telephone encounter from 7/22, advised this was addressed and sent in today. No further questions.     Emilee Ramirez RN 08/01/22 10:53 AM    Health Triage Nurse Advisor

## 2022-08-01 NOTE — TELEPHONE ENCOUNTER
RN attempted to contact patient, but no answer. Left message on patient's voice mail to call clinic back.    Continuous Blood Gluc Sensor (FREESTYLE ESTRADA 14 DAY SENSOR) [facility]  Pharmacy: [facility] - [facility] [address]  [facility]     Magalis Tucker PA-C       Ok, I put in the addendum and refilled the sensors.  Hopefully that is what they need?

## 2022-08-02 DIAGNOSIS — E11.9 TYPE 2 DIABETES MELLITUS WITHOUT COMPLICATION, WITH LONG-TERM CURRENT USE OF INSULIN (H): ICD-10-CM

## 2022-08-02 DIAGNOSIS — Z79.4 TYPE 2 DIABETES MELLITUS WITHOUT COMPLICATION, WITH LONG-TERM CURRENT USE OF INSULIN (H): ICD-10-CM

## 2022-08-02 RX ORDER — FLASH GLUCOSE SENSOR
1 KIT MISCELLANEOUS
Qty: 6 EACH | Refills: 11 | Status: SHIPPED | OUTPATIENT
Start: 2022-08-02 | End: 2023-02-07

## 2022-08-02 NOTE — TELEPHONE ENCOUNTER
RN made 2nd attempt to contact patient, but no answer. Left message on patient's voice mail to call clinic back.    Continuous Blood Gluc Sensor (FREESTYLE ESTRADA 14 DAY SENSOR) [facility]  [place]armacy: [facility] - [facility] [phone]  [facility]     Magalis Tucker PA-C       Ok, I put in the addendum and refilled the sensors.  Hopefully that is what they need?

## 2022-08-03 NOTE — TELEPHONE ENCOUNTER
RN did chart review.       Patient's wife called on 8/1/2022 and message was relayed.       Please see additional information on telephone encounter 8/1/2022.          Aidee Lee RN  [facility]

## 2022-08-08 NOTE — PROGRESS NOTES
Medication Therapy Management (MTM) Encounter    ASSESSMENT:                            Medication Adherence/Access: No concerns identified.       Type 2 Diabetes: Last A1C above goal <8% - blood glucose readings are more consistently in range off Ozempic and back to basal/bolus insulin. Feeling less dizzy now that he's off ozempic. Not having much decrease after meals and still some elevated fastings therefore will increase Novolog dose today.        CAD: Last LDL at goal <70. BP at goal <130/80. Pulse at goal . Appropriately using high intensity statin and DAPT.         Mood: No formal diagnosis of depression. Has had positive PHQ9 in the past, but now feels better since dizziness is improved and he's able to ride his bike again.     Low Vitamin D: WNL with target goal of 45-60. Is using a supplement.        PLAN:                            1. Increase Novolog to 7 units three times daily   2. Continue Tresiba to 20 units       Follow-up: Return in about 6 weeks (around 9/20/2022) for Medication Management Pharmacist, in person.    SUBJECTIVE/OBJECTIVE:                          Goyo Cole is a 76 year old male coming in for a follow-up visit. He was referred to me from Magalis Tucker PA-C. Patient was accompanied by wife.. Today's visit is a follow-up MTM visit from 06/28/2022.     Reason for visit: Referred for diabetes management.      Allergies/ADRs: Reviewed in chart  Past Medical History: Reviewed in chart  Tobacco: He reports that he has quit smoking. He quit smokeless tobacco use about 6 years ago.  His smokeless tobacco use included chew. Still using chewing tobacco - working on reducing.   Alcohol: Last drink was at [employer]. Sometimes, he'll go out and have a lot to drink.       Medication Adherence/Access: Cost concerns with Ozempic.  Patient's wife helps to set up with medications.     Sets all the vitamins in an old pill vial for the day. Adds in daily medicines.       Type 2 Diabetes:  "Prescribed Metformin 500 mg 2 tabs twice daily, Pioglitazone 30 mg daily. At last visit, patient wanted to transition off Ozempic and to a basal/bolus insulin regimen again. Currently at Tresiba 20 units daily and Novolog 5 units three times daily.     Since being off the Ozempic, notes that he's less dizzy. Has been able to get back riding his bike.       Blood sugar monitoring: Continuous Glucose Monitor. Continues to feel the CGM isn't very accurate. Still finger sticking to find the difference. States sometimes it's over 100 points.     Had a Gap in readings due to medicare renewal.     When he was testing with finger pokes, was \"146 in that neighborhood\" unclear if this is morning or evening.     For at least a week, wasn't doing any finger checks because he didn't feel bad.                                   Symptoms of low blood sugar? None  Symptoms of high blood sugar? none    Diet/Exercise: Previously things tasted \"clinical\" - now able to taste better. Has a couple of double stuffed Oreo cookies after lunch and dinner. Had oatmeal, a square of dark chocolate, butter, milk and honey in the oatmeal, and a banana.       Hemoglobin A1C   Date Value Ref Range Status   07/12/2022 8.5 (H) 0.0 - 5.6 % Final     Comment:     Normal <5.7%   Prediabetes 5.7-6.4%    Diabetes 6.5% or higher     Note: Adopted from ADA consensus guidelines.   01/04/2022 7.4 (H) 0.0 - 5.6 % Final     Comment:     Normal <5.7%   Prediabetes 5.7-6.4%    Diabetes 6.5% or higher     Note: Adopted from ADA consensus guidelines.   07/09/2021 6.7 (H) <=5.6 % Final      Microalbumin Urine mg/dL   Date Value Ref Range Status   01/21/2021 1.79 0.00 - 1.99 mg/dL Final      No results found for: UCRR  LDL Cholesterol Calculated   Date Value Ref Range Status   04/12/2021 46 <=129 mg/dL Final     LDL Cholesterol Direct   Date Value Ref Range Status   02/13/2020 42 <=129 mg/dl Final     Creatinine   Date Value Ref Range Status   07/12/2022 1.43 (H) 0.67 " - 1.17 mg/dL Final     GFR Estimate   Date Value Ref Range Status   07/12/2022 51 (L) >60 mL/min/1.73m2 Final     Comment:     Effective December 21, 2021 eGFRcr in adults is calculated using the 2021 CKD-EPI creatinine equation which includes age and gender (Dharmesh grey al., [place], DOI: 10.1056/JPAGhq8415653)   07/09/2021 47 (L) >60 mL/min/1.73m2 Final     GFR Estimate If Black   Date Value Ref Range Status   07/09/2021 57 (L) >60 mL/min/1.73m2 Final         CAD: Prescribed Metoprolol Tartrate 25 mg 0.5 tabs twice daily, Lisinopril 5mg daily, Brillinta 90 mg twice daily, Aspirin 81 mg daily, Atorvastatin 40 mg daily.     1/6 Echo at [facility] - EF 60%.     Dizziness has improved since being off Ozempic. Very few findings.     Recent Labs   Lab Test 04/12/21  0740 02/13/20  1133 06/01/18  1613 02/06/18  0818   CHOL 95  --   --  105   HDL 33*  --   --  35*   LDL 46 42   < > 49   TRIG 82  --   --  103    < > = values in this interval not displayed.       BP Readings from Last 3 Encounters:   08/09/22 123/70   07/12/22 121/73   06/28/22 130/77      Pulse Readings from Last 3 Encounters:   08/09/22 80   07/12/22 83   06/28/22 91       Mood: Not currently on medications     Feeling better now that he's back to riding his motorcycle. Food tastes good again.     PHQ-9 score:    PHQ 7/12/2022   PHQ-9 Total Score 5   Q9: Thoughts of better off dead/self-harm past 2 weeks Not at all         Low Vitamin D: Prescribed Vitamin D3 2000 units daily .     Vitamin D Deficiency Screening Results:  Lab Results   Component Value Date    VITDT 56 03/08/2022         Today's Vitals: /70   Pulse 80   ----------------    I spent 52 minutes with this patient today. All changes were made via collaborative practice agreement with Magalis Tucker PA-C. A copy of the visit note was provided to the patient's provider(s).    The patient was given a summary of these recommendations.     Jake Ortega PharmD  Medication Therapy Management (MTM)  Pharmacist  [facility]       Medication Therapy Recommendations  Type 2 diabetes mellitus with kidney complication, with long-term current use of insulin (H)    Current Medication: insulin aspart (NOVOLOG FLEXPEN) 100 UNIT/ML pen   Rationale: Dose too low - Dosage too low - Effectiveness   Recommendation: Increase Dose   Status: Accepted per CPA

## 2022-08-09 ENCOUNTER — OFFICE VISIT (OUTPATIENT)
Dept: PHARMACY | Facility: CLINIC | Age: 77
End: 2022-08-09
Payer: COMMERCIAL

## 2022-08-09 VITALS — SYSTOLIC BLOOD PRESSURE: 123 MMHG | HEART RATE: 80 BPM | DIASTOLIC BLOOD PRESSURE: 70 MMHG

## 2022-08-09 DIAGNOSIS — F32.A DEPRESSION, UNSPECIFIED DEPRESSION TYPE: ICD-10-CM

## 2022-08-09 DIAGNOSIS — Z79.4 TYPE 2 DIABETES MELLITUS WITH CHRONIC KIDNEY DISEASE, WITH LONG-TERM CURRENT USE OF INSULIN, UNSPECIFIED CKD STAGE (H): Primary | ICD-10-CM

## 2022-08-09 DIAGNOSIS — E11.22 TYPE 2 DIABETES MELLITUS WITH CHRONIC KIDNEY DISEASE, WITH LONG-TERM CURRENT USE OF INSULIN, UNSPECIFIED CKD STAGE (H): Primary | ICD-10-CM

## 2022-08-09 DIAGNOSIS — I25.810 CORONARY ARTERY DISEASE INVOLVING AUTOLOGOUS ARTERY CORONARY BYPASS GRAFT WITHOUT ANGINA PECTORIS: ICD-10-CM

## 2022-08-09 DIAGNOSIS — E55.9 VITAMIN D DEFICIENCY: ICD-10-CM

## 2022-08-09 PROCEDURE — 99607 MTMS BY PHARM ADDL 15 MIN: CPT | Performed by: PHARMACIST

## 2022-08-09 PROCEDURE — 99606 MTMS BY PHARM EST 15 MIN: CPT | Performed by: PHARMACIST

## 2022-08-09 RX ORDER — INSULIN ASPART 100 [IU]/ML
7 INJECTION, SOLUTION INTRAVENOUS; SUBCUTANEOUS
Qty: 15 ML | Refills: 3 | Status: SHIPPED | OUTPATIENT
Start: 2022-08-09 | End: 2023-04-07

## 2022-08-09 NOTE — PATIENT INSTRUCTIONS
"Recommendations from today's MTM visit:                                                         Increase Novolog to 7 units three times daily   Continue Tresiba to 20 units   3. Watch your carb counts -try to stick to less than 45 g of carbs per meal.       Follow-up: Return in about 4 weeks (around 9/6/2022) for Medication Management Pharmacist, in person.    It was great speaking with you today.  I value your experience and would be very thankful for your time in providing feedback in our clinic survey. In the next few days, you may receive an email or text message from [employer] with a link to a survey related to your  clinical pharmacist.\"     To schedule another MTM appointment, please call the clinic directly or you may call the MTM scheduling line at [phone] or toll-free at [phone].     My Clinical Pharmacist's contact information:                                                      Please feel free to contact me with any questions or concerns you have.      Jake Ortega, PharmD  Medication Therapy Management (MTM) Pharmacist  [facility]      "

## 2022-09-20 ENCOUNTER — OFFICE VISIT (OUTPATIENT)
Dept: PHARMACY | Facility: CLINIC | Age: 77
End: 2022-09-20
Payer: COMMERCIAL

## 2022-09-20 VITALS — SYSTOLIC BLOOD PRESSURE: 129 MMHG | HEART RATE: 80 BPM | DIASTOLIC BLOOD PRESSURE: 68 MMHG

## 2022-09-20 DIAGNOSIS — I25.810 CORONARY ARTERY DISEASE INVOLVING AUTOLOGOUS ARTERY CORONARY BYPASS GRAFT WITHOUT ANGINA PECTORIS: ICD-10-CM

## 2022-09-20 DIAGNOSIS — E55.9 VITAMIN D DEFICIENCY: ICD-10-CM

## 2022-09-20 DIAGNOSIS — E11.22 TYPE 2 DIABETES MELLITUS WITH CHRONIC KIDNEY DISEASE, WITH LONG-TERM CURRENT USE OF INSULIN, UNSPECIFIED CKD STAGE (H): Primary | ICD-10-CM

## 2022-09-20 DIAGNOSIS — Z79.4 TYPE 2 DIABETES MELLITUS WITH CHRONIC KIDNEY DISEASE, WITH LONG-TERM CURRENT USE OF INSULIN, UNSPECIFIED CKD STAGE (H): Primary | ICD-10-CM

## 2022-09-20 PROCEDURE — 99606 MTMS BY PHARM EST 15 MIN: CPT | Performed by: PHARMACIST

## 2022-09-20 PROCEDURE — 99607 MTMS BY PHARM ADDL 15 MIN: CPT | Performed by: PHARMACIST

## 2022-09-20 NOTE — PROGRESS NOTES
Medication Therapy Management (MTM) Encounter    ASSESSMENT:                            Medication Adherence/Access: No concerns identified.       Type 2 Diabetes: Last A1C above goal <8% - blood glucose readings are more consistently in range off Ozempic and back to basal/bolus insulin. At goal average <180. At goal >70% in target range. Continue current insulin dosing.      CAD: Last LDL at goal <70. BP at goal <130/80. Pulse at goal . Appropriately using high intensity statin and DAPT. Will be seeing cardiology in January.       Low Vitamin D: WNL with target goal of 45-60. Is using a supplement.        PLAN:                            1. No medication changes.       Follow-up: Schedule with PharmD as needed.     SUBJECTIVE/OBJECTIVE:                          Goyo Cole is a 76 year old male coming in for a follow-up visit. He was referred to me from Magalis Tucker PA-C. Patient was accompanied by wife.. Today's visit is a follow-up MTM visit from 08/09/2022.     Reason for visit: Referred for diabetes management.      Allergies/ADRs: Reviewed in chart  Past Medical History: Reviewed in chart  Tobacco: He reports that he has quit smoking. He quit smokeless tobacco use about 6 years ago.  His smokeless tobacco use included chew. Still using chewing tobacco - working on reducing.   Alcohol: Last drink was at [employer]. Sometimes, he'll go out and have a lot to drink.       Medication Adherence/Access: Patient's wife helps to set up with medications.     Sets all the vitamins in an old pill vial for the day. Adds in daily medicines.       Type 2 Diabetes: Prescribed Metformin 500 mg 2 tabs twice daily, Pioglitazone 30 mg daily, Tresiba 20 units daily and Novolog 7 units three times daily.     Since being off the Ozempic, notes that he's less dizzy. Has been able to get back riding his bike.       Blood sugar monitoring: Continuous Glucose Monitor. Continues to feel the CGM isn't very  accurate.                Symptoms of low blood sugar? None  Symptoms of high blood sugar? none    Diet/Exercise: Has noticed increased appetite and also increased weight since the last visit. Now around 260# declined recheck today. Has been focusing on portion control with sweets. Having 3 cookies vs 5-6 at a time.       Hemoglobin A1C   Date Value Ref Range Status   07/12/2022 8.5 (H) 0.0 - 5.6 % Final     Comment:     Normal <5.7%   Prediabetes 5.7-6.4%    Diabetes 6.5% or higher     Note: Adopted from ADA consensus guidelines.   01/04/2022 7.4 (H) 0.0 - 5.6 % Final     Comment:     Normal <5.7%   Prediabetes 5.7-6.4%    Diabetes 6.5% or higher     Note: Adopted from ADA consensus guidelines.   07/09/2021 6.7 (H) <=5.6 % Final      Microalbumin Urine mg/dL   Date Value Ref Range Status   01/21/2021 1.79 0.00 - 1.99 mg/dL Final      No results found for: UCRR  LDL Cholesterol Calculated   Date Value Ref Range Status   04/12/2021 46 <=129 mg/dL Final     LDL Cholesterol Direct   Date Value Ref Range Status   02/13/2020 42 <=129 mg/dl Final     Creatinine   Date Value Ref Range Status   07/12/2022 1.43 (H) 0.67 - 1.17 mg/dL Final     GFR Estimate   Date Value Ref Range Status   07/12/2022 51 (L) >60 mL/min/1.73m2 Final     Comment:     Effective December 21, 2021 eGFRcr in adults is calculated using the 2021 CKD-EPI creatinine equation which includes age and gender (Dharmesh et al., NEJ, DOI: 10.1056/WJAEbg5232998)   07/09/2021 47 (L) >60 mL/min/1.73m2 Final     GFR Estimate If Black   Date Value Ref Range Status   07/09/2021 57 (L) >60 mL/min/1.73m2 Final         CAD: Prescribed Metoprolol Tartrate 25 mg 0.5 tabs twice daily, Lisinopril 5mg daily, Brillinta 90 mg twice daily, Aspirin 81 mg daily, Atorvastatin 40 mg daily.     1/6 Echo at [facility] - EF 60%.     Dizziness has improved since being off Ozempic.    Recent Labs   Lab Test 04/12/21  0740 02/13/20  1133 06/01/18  1613 02/06/18  0818   CHOL 95  --   --  105    HDL 33*  --   --  35*   LDL 46 42   < > 49   TRIG 82  --   --  103    < > = values in this interval not displayed.       BP Readings from Last 3 Encounters:   09/20/22 129/68   08/09/22 123/70   07/12/22 121/73      Pulse Readings from Last 3 Encounters:   09/20/22 80   08/09/22 80   07/12/22 83         Low Vitamin D: Prescribed Vitamin D3 2000 units daily .     Vitamin D Deficiency Screening Results:  Lab Results   Component Value Date    VITDT 56 03/08/2022         Today's Vitals: /68   Pulse 80   ----------------    I spent 25 minutes with this patient today. All changes were made via collaborative practice agreement with Magalis Tucker PA-C. A copy of the visit note was provided to the patient's provider(s).    The patient declined a summary of these recommendations.     Jake Ortega, Liyah  Medication Therapy Management (MTM) Pharmacist  [facility]       Medication Therapy Recommendations  No medication therapy recommendations to display

## 2022-09-20 NOTE — Clinical Note
Blood glucose stable. No changes today. I'm having them return to see me PRN. Not sure if you want to see him yet before the end of the year. Can probably get a repeat A1C and get him back to goal for quality?

## 2022-09-26 ENCOUNTER — TRANSFERRED RECORDS (OUTPATIENT)
Dept: HEALTH INFORMATION MANAGEMENT | Facility: CLINIC | Age: 77
End: 2022-09-26

## 2022-09-26 LAB — RETINOPATHY: NEGATIVE

## 2022-10-01 ENCOUNTER — HEALTH MAINTENANCE LETTER (OUTPATIENT)
Age: 77
End: 2022-10-01

## 2022-10-03 DIAGNOSIS — E11.9 TYPE 2 DIABETES MELLITUS WITHOUT COMPLICATION, WITHOUT LONG-TERM CURRENT USE OF INSULIN (H): ICD-10-CM

## 2022-10-04 RX ORDER — PIOGLITAZONEHYDROCHLORIDE 30 MG/1
30 TABLET ORAL DAILY
Qty: 90 TABLET | Refills: 3 | Status: SHIPPED | OUTPATIENT
Start: 2022-10-04 | End: 2023-05-05

## 2022-10-04 NOTE — TELEPHONE ENCOUNTER
"Routing refill request to provider for review/approval because:  Labs out of range:  Creatinine  Labs not current:  AST    Last Written Prescription Date:  10/4/21  Last Fill Quantity: 90,  # refills: 3   Last office visit provider:  7/12/22     Requested Prescriptions   Pending Prescriptions Disp Refills     pioglitazone (ACTOS) 30 MG tablet 90 tablet 3     Sig: Take 1 tablet (30 mg) by mouth daily       Thiazolidinedione Agents (TZDs)  Failed - 10/4/2022 11:39 AM        Failed - Patient has a normal AST within the past 12 mos.      Recent Labs   Lab Test 02/13/20  1133   AST 22             Failed - Patient has a normal serum Creatinine in the past 12 months     Recent Labs   Lab Test 07/12/22  1528   CR 1.43*       Ok to refill medication if creatinine is low          Passed - Patient has a normal ALT within the past 12 mos.     Recent Labs   Lab Test 01/04/22  1350   ALT 19             Passed - Patient has documented A1c within the specified period of time.     If HgbA1C is 8 or greater, it needs to be on file within the past 3 months.  If less than 8, must be on file within the past 6 months.     Recent Labs   Lab Test 07/12/22  1528   A1C 8.5*             Passed - Diagnosis not CHF        Passed - Medication is active on med list        Passed - Patient is age 18 or older        Passed - Recent (6 mo) or future (30 days) visit within the authorizing provider's specialty     Patient had office visit in the last 6 months or has a visit in the next 30 days with authorizing provider or within the authorizing provider's specialty.  See \"Patient Info\" tab in inbasket, or \"Choose Columns\" in Meds & Orders section of the refill encounter.                 Ryder Echavarria RN 10/04/22 11:39 AM  "

## 2022-10-10 ENCOUNTER — OFFICE VISIT (OUTPATIENT)
Dept: NEUROLOGY | Facility: CLINIC | Age: 77
End: 2022-10-10
Attending: PHYSICIAN ASSISTANT
Payer: MEDICARE

## 2022-10-10 VITALS — DIASTOLIC BLOOD PRESSURE: 72 MMHG | SYSTOLIC BLOOD PRESSURE: 136 MMHG | HEART RATE: 80 BPM

## 2022-10-10 DIAGNOSIS — E11.22 TYPE 2 DIABETES MELLITUS WITH CHRONIC KIDNEY DISEASE, WITH LONG-TERM CURRENT USE OF INSULIN, UNSPECIFIED CKD STAGE (H): Primary | ICD-10-CM

## 2022-10-10 DIAGNOSIS — R42 DIZZINESS: ICD-10-CM

## 2022-10-10 DIAGNOSIS — Z79.4 TYPE 2 DIABETES MELLITUS WITH CHRONIC KIDNEY DISEASE, WITH LONG-TERM CURRENT USE OF INSULIN, UNSPECIFIED CKD STAGE (H): Primary | ICD-10-CM

## 2022-10-10 DIAGNOSIS — R42 VERTIGO: ICD-10-CM

## 2022-10-10 PROCEDURE — 99205 OFFICE O/P NEW HI 60 MIN: CPT | Performed by: PSYCHIATRY & NEUROLOGY

## 2022-10-10 NOTE — PROGRESS NOTES
[facility] Neurology Consultation    Goyo Cole MRN# [id number]   Age: 76 year old YOB: 1945     Requesting physician: Magalis Montoya     Reason for Consultation: dizziness      History of Presenting Symptoms:   Goyo Cole is a 76 year old male who presents today for evaluation of dizziness.  The patient has a pertinent medical history of ELIOT (CPAP use), HTN, HLD, DMII (poorly controlled, 8.5% A1c on 7/12/2022), MV stenosis, CAD-NSTEMI s/p 2 stents to LAD (on Brilinta for one year, then ASA).     The patient was seen with his cardiologist 5/16/2022 indicating having dizziness while on lisinopril.  Dizziness was described as light-headed, and vertiginous (room spinning).  The dizziness was noted to be long-standing with his PCP in the past.  At a PCP visit 7/12/2022, it was indicated he was seeing ENT for vestibular issues and attending vestibular rehabilitation.    Today, he confirms that prior to his CAD-NSTEMI, he had no noted dizziness, and during his hospitalization he had some changes in his medications made (ozempic was started).  Today, the patient has discussed his dizziness with his pharmacy provider and is trying to change his medications to improve his dizziness.  He did have some improvement upon switching from ozempic to insulin, especially given his A1c was worse on the medication (8.5%).  His dizziness spells are now gone.    His dizzy spells in the past were described as room spinning, and closing his eyes improved his symptoms at times.  The events could occur when just sitting, but also could occur with standing from a seated position. The dizziness would last anywhere from a few minutes to 10 minutes.  There was no nausea, vomiting, weakness, or sensory loss. The dizziness led a sense of fear for his balance, which ultimately led him to reduce his motorcycle use.  His last event was a few weeks ago, but it only lasted a few seconds and was more of a  feeling of disorientation or light-headed.      Medications:   ASA  Atorvastatin  Insulin  Lisinopril  Metoprolol  Pioglitazone  Ticagrelor     Physical Exam:   Vitals: /72 (BP Location: Right arm, Patient Position: Sitting, Cuff Size: Adult Regular)   Pulse 80    General: Seated comfortably in no acute distress.  HEENT: Neck supple with normal range of motion. No paracervical muscle tenderness or tightness.  Optic discs sharp and vasculature normal on funduscopic exam.   Skin: No rashes  Neurologic:     Mental Status: Fully alert, attentive and oriented. Speech clear and fluent, no paraphasic errors.      Cranial Nerves: Visual fields intact. PERRL. EOMI with normal smooth pursuit. Facial sensation intact/symmetric. Facial movements symmetric. Hearing not formally tested but intact to conversation. Palate elevation symmetric, uvula midline. No dysarthria. Shoulder shrug strong bilaterally. Tongue protrusion midline.     Motor: No tremors or other abnormal movements observed. Muscle tone normal throughout. No pronator drift. Normal/symmetric rapid finger tapping. Strength 5/5 throughout upper and lower extremities.     Deep Tendon Reflexes: 2+/symmetric throughout upper and lower extremities. No clonus. Toes downgoing bilaterally.     Sensory: Intact/symmetric to light touch, pinprick, temperature, vibration and proprioception throughout upper and lower extremities. Negative Romberg.      Coordination: Finger-nose-finger and heel-shin intact without dysmetria. Rapid alternating movements intact/symmetric with normal speed and rhythm.     Gait: Stands quickly with arms across chest. Wider base, valgus knees. Full stride, heels stay apart during stride (over 6 inches).          Assessment and Plan:   Assessment:  Medication induced vertigo    The patient's vertiginous sensations are gone at this time, and he relates this to his use of ozempic.  I am no so certain this is the case, but his exam is unremarkable  today so it is unclear as to another etiology being present. The medication can lead to dizziness in a low percentage of patients, but as to the type of dizziness, I cannot say.  There is no ongoing issues of dizziness with stimulus indicative of a vascular, or cardiac cause (head turning, exercise, positional changes).  There are no defined deficits on exam to indicate a brainstem infarction or some sort of vestibular nerve involvement (tumor, vasculopathy leading to compression).      Plan:  No further testing at this time, but if symptoms return then a high degree of consideration towards MRI brain w/wout contrast as well as likely vascular imaging would be needed to rule out stroke or other vascular etiologies.    Follow up in Neurology clinic should new concerns arise.    TALAT Denny D.O.   of Neurology    Total time today (60 min) in this patient encounter was spent on pre-charting, counseling and/or coordination of care.  The patient is in agreement with this plan and has no further questions.

## 2022-10-10 NOTE — NURSING NOTE
Chief Complaint   Patient presents with     Consult For     Referred by Magalis Tucker PA-C Dizziness

## 2022-10-10 NOTE — LETTER
10/10/2022         RE: Goyo Cole  [address]  [place] [address]        Dear Colleague,    Thank you for referring your patient, Goyo Cole, to the [facility]. Please see a copy of my visit note below.    [facility] Consultation    Goyo Cole MRN# [id number]   Age: 76 year old YOB: 1945     Requesting physician: Magalis Montoya     Reason for Consultation: dizziness      History of Presenting Symptoms:   Goyo Cole is a 76 year old male who presents today for evaluation of dizziness.  The patient has a pertinent medical history of ELIOT (CPAP use), HTN, HLD, DMII (poorly controlled, 8.5% A1c on 7/12/2022), MV stenosis, CAD-NSTEMI s/p 2 stents to LAD (on Brilinta for one year, then ASA).     The patient was seen with his cardiologist 5/16/2022 indicating having dizziness while on lisinopril.  Dizziness was described as light-headed, and vertiginous (room spinning).  The dizziness was noted to be long-standing with his PCP in the past.  At a PCP visit 7/12/2022, it was indicated he was seeing ENT for vestibular issues and attending vestibular rehabilitation.    Today, he confirms that prior to his CAD-NSTEMI, he had no noted dizziness, and during his hospitalization he had some changes in his medications made (ozempic was started).  Today, the patient has discussed his dizziness with his pharmacy provider and is trying to change his medications to improve his dizziness.  He did have some improvement upon switching from ozempic to insulin, especially given his A1c was worse on the medication (8.5%).  His dizziness spells are now gone.    His dizzy spells in the past were described as room spinning, and closing his eyes improved his symptoms at times.  The events could occur when just sitting, but also could occur with standing from a seated position. The dizziness would last anywhere from a few minutes to 10  minutes.  There was no nausea, vomiting, weakness, or sensory loss. The dizziness led a sense of fear for his balance, which ultimately led him to reduce his motorcycle use.  His last event was a few weeks ago, but it only lasted a few seconds and was more of a feeling of disorientation or light-headed.      Medications:   ASA  Atorvastatin  Insulin  Lisinopril  Metoprolol  Pioglitazone  Ticagrelor     Physical Exam:   Vitals: /72 (BP Location: Right arm, Patient Position: Sitting, Cuff Size: Adult Regular)   Pulse 80    General: Seated comfortably in no acute distress.  HEENT: Neck supple with normal range of motion. No paracervical muscle tenderness or tightness.  Optic discs sharp and vasculature normal on funduscopic exam.   Skin: No rashes  Neurologic:     Mental Status: Fully alert, attentive and oriented. Speech clear and fluent, no paraphasic errors.      Cranial Nerves: Visual fields intact. PERRL. EOMI with normal smooth pursuit. Facial sensation intact/symmetric. Facial movements symmetric. Hearing not formally tested but intact to conversation. Palate elevation symmetric, uvula midline. No dysarthria. Shoulder shrug strong bilaterally. Tongue protrusion midline.     Motor: No tremors or other abnormal movements observed. Muscle tone normal throughout. No pronator drift. Normal/symmetric rapid finger tapping. Strength 5/5 throughout upper and lower extremities.     Deep Tendon Reflexes: 2+/symmetric throughout upper and lower extremities. No clonus. Toes downgoing bilaterally.     Sensory: Intact/symmetric to light touch, pinprick, temperature, vibration and proprioception throughout upper and lower extremities. Negative Romberg.      Coordination: Finger-nose-finger and heel-shin intact without dysmetria. Rapid alternating movements intact/symmetric with normal speed and rhythm.     Gait: Stands quickly with arms across chest. Wider base, valgus knees. Full stride, heels stay apart during stride  (over 6 inches).          Assessment and Plan:   Assessment:  Medication induced vertigo    The patient's vertiginous sensations are gone at this time, and he relates this to his use of ozempic.  I am no so certain this is the case, but his exam is unremarkable today so it is unclear as to another etiology being present. The medication can lead to dizziness in a low percentage of patients, but as to the type of dizziness, I cannot say.  There is no ongoing issues of dizziness with stimulus indicative of a vascular, or cardiac cause (head turning, exercise, positional changes).  There are no defined deficits on exam to indicate a brainstem infarction or some sort of vestibular nerve involvement (tumor, vasculopathy leading to compression).      Plan:  No further testing at this time, but if symptoms return then a high degree of consideration towards MRI brain w/wout contrast as well as likely vascular imaging would be needed to rule out stroke or other vascular etiologies.    Follow up in Neurology clinic should new concerns arise.    TALAT Denny D.O.   of Neurology    Total time today (60 min) in this patient encounter was spent on pre-charting, counseling and/or coordination of care.  The patient is in agreement with this plan and has no further questions.        Again, thank you for allowing me to participate in the care of your patient.        Sincerely,        Chris Denny, DO

## 2022-10-10 NOTE — PATIENT INSTRUCTIONS
I think your dizziness of the past was medication induced.  I don't think you need imaging or other studies at this time.

## 2022-11-08 DIAGNOSIS — E11.22 TYPE 2 DIABETES MELLITUS WITH CHRONIC KIDNEY DISEASE, WITH LONG-TERM CURRENT USE OF INSULIN, UNSPECIFIED CKD STAGE (H): ICD-10-CM

## 2022-11-08 DIAGNOSIS — Z79.4 TYPE 2 DIABETES MELLITUS WITH CHRONIC KIDNEY DISEASE, WITH LONG-TERM CURRENT USE OF INSULIN, UNSPECIFIED CKD STAGE (H): ICD-10-CM

## 2022-11-08 NOTE — TELEPHONE ENCOUNTER
Medication Question or Refill        What medication are you calling about (include dose and sig)?:   metFORMIN (GLUCOPHAGE) 500 MG tablet 360 tablet 3 8/12/2021  No   Sig: [METFORMIN (GLUCOPHAGE) 500 MG TABLET] TAKE 2 TABLETS(1000 MG) BY MOUTH TWICE DAILY WITH MEALS         Controlled Substance Agreement on file:   CSA -- Patient Level:    CSA: None found at the patient level.       Who prescribed the medication?: Edda Tuckerssa     Do you need a refill? Yes: Pt requesting refill on med, pt stated that he is almost out and a little worried refill will not be order on time. Please look into refill request and order med if applicable. Thank you.     When did you use the medication last? Unknown     Patient offered an appointment? No    Do you have any questions or concerns?  No    Preferred Pharmacy:   [address] [address]  Phone: [phone] Fax: [phone]      Could we send this information to you in [facility] or would you prefer to receive a phone call?:   Patient would prefer a phone call   Okay to leave a detailed message?: Yes at Home number on file [phone] (home)

## 2022-11-16 ENCOUNTER — LAB (OUTPATIENT)
Dept: LAB | Facility: CLINIC | Age: 77
End: 2022-11-16
Payer: MEDICARE

## 2022-11-16 DIAGNOSIS — Z79.4 TYPE 2 DIABETES MELLITUS WITH CHRONIC KIDNEY DISEASE, WITH LONG-TERM CURRENT USE OF INSULIN, UNSPECIFIED CKD STAGE (H): ICD-10-CM

## 2022-11-16 DIAGNOSIS — E11.22 TYPE 2 DIABETES MELLITUS WITH CHRONIC KIDNEY DISEASE, WITH LONG-TERM CURRENT USE OF INSULIN, UNSPECIFIED CKD STAGE (H): ICD-10-CM

## 2022-11-16 DIAGNOSIS — Z13.220 SCREENING FOR HYPERLIPIDEMIA: ICD-10-CM

## 2022-11-16 DIAGNOSIS — Z11.59 NEED FOR HEPATITIS C SCREENING TEST: ICD-10-CM

## 2022-11-16 DIAGNOSIS — I25.10 ATHEROSCLEROSIS OF NATIVE CORONARY ARTERY OF NATIVE HEART WITHOUT ANGINA PECTORIS: ICD-10-CM

## 2022-11-16 LAB
CHOLEST SERPL-MCNC: 97 MG/DL
CREAT UR-MCNC: 94.8 MG/DL
HBA1C MFR BLD: 6.8 % (ref 0–5.6)
HCV AB SERPL QL IA: NONREACTIVE
HDLC SERPL-MCNC: 38 MG/DL
LDLC SERPL CALC-MCNC: 30 MG/DL
MICROALBUMIN UR-MCNC: 12.6 MG/L
MICROALBUMIN/CREAT UR: 13.29 MG/G CR (ref 0–17)
NONHDLC SERPL-MCNC: 59 MG/DL
TRIGL SERPL-MCNC: 145 MG/DL

## 2022-11-16 PROCEDURE — 36415 COLL VENOUS BLD VENIPUNCTURE: CPT | Performed by: PHYSICIAN ASSISTANT

## 2022-11-16 PROCEDURE — 80061 LIPID PANEL: CPT | Performed by: PHYSICIAN ASSISTANT

## 2022-11-16 PROCEDURE — 86803 HEPATITIS C AB TEST: CPT | Performed by: PHYSICIAN ASSISTANT

## 2022-11-16 PROCEDURE — 82043 UR ALBUMIN QUANTITATIVE: CPT | Performed by: PHYSICIAN ASSISTANT

## 2022-11-16 PROCEDURE — 83036 HEMOGLOBIN GLYCOSYLATED A1C: CPT | Performed by: PHYSICIAN ASSISTANT

## 2022-11-17 ENCOUNTER — TRANSFERRED RECORDS (OUTPATIENT)
Dept: HEALTH INFORMATION MANAGEMENT | Facility: CLINIC | Age: 77
End: 2022-11-17

## 2022-11-21 ENCOUNTER — MYC MEDICAL ADVICE (OUTPATIENT)
Dept: FAMILY MEDICINE | Facility: CLINIC | Age: 77
End: 2022-11-21

## 2022-11-21 DIAGNOSIS — R06.6 CHRONIC HICCUPS: Primary | ICD-10-CM

## 2022-11-22 NOTE — TELEPHONE ENCOUNTER
Can you call [facility] and see if we can get a copy of this office visit?  If they need an ANIA, please call patient and I think you can get a verbal okay over the phone.    It would be helpful to see what the GI doctors were thinking, then we will try to figure this out.  I agree that  we would like to avoid the ER if possible.

## 2022-11-22 NOTE — TELEPHONE ENCOUNTER
Message routed to Magalis BARRERA for review / plan.    Brittney Fields RN  [facility]        To: [facility]      From: Goyo RODRIGUEZ Douglas      Created: 11/21/2022 3:48 PM              Adan Blancas.  We need your advice. Goyo has been having non stop hiccups for over a week now. We went to [facility] thinking they could scope him, but because he is on blood thinners, they declined to do this, just recommended a stomach xray. When I explained that he is also regurgitating mucous, they advised it could be a food blockage in his esophagus and he should go to the ER for care. Given the fact that the ER is over their head with sick folks and the wait can be more than 6 hours to be seen, can you think of any other way for Goyo to get a CAT scan without going through the er? Goyo is adamant that he won't go and sit in the ER ( after the recent experience of sitting with me in the ER for 6 hours before I was seen).   Thank you for any advice.

## 2022-11-23 NOTE — TELEPHONE ENCOUNTER
Contacted [facility] and they will fax report to clinic.    Brittney Fields RN  [facility]

## 2022-11-28 ENCOUNTER — TRANSFERRED RECORDS (OUTPATIENT)
Dept: HEALTH INFORMATION MANAGEMENT | Facility: CLINIC | Age: 77
End: 2022-11-28

## 2022-12-07 ENCOUNTER — HOSPITAL ENCOUNTER (OUTPATIENT)
Facility: HOSPITAL | Age: 77
End: 2022-12-07
Attending: INTERNAL MEDICINE | Admitting: INTERNAL MEDICINE
Payer: MEDICARE

## 2022-12-16 ENCOUNTER — IMMUNIZATION (OUTPATIENT)
Dept: FAMILY MEDICINE | Facility: CLINIC | Age: 77
End: 2022-12-16
Payer: MEDICARE

## 2022-12-16 PROCEDURE — G0008 ADMIN INFLUENZA VIRUS VAC: HCPCS

## 2022-12-16 PROCEDURE — 90662 IIV NO PRSV INCREASED AG IM: CPT

## 2022-12-28 DIAGNOSIS — E11.9 TYPE 2 DIABETES MELLITUS WITHOUT COMPLICATION, WITHOUT LONG-TERM CURRENT USE OF INSULIN (H): ICD-10-CM

## 2022-12-29 RX ORDER — PEN NEEDLE, DIABETIC 32GX 5/32"
NEEDLE, DISPOSABLE MISCELLANEOUS
Qty: 300 EACH | Refills: 2 | Status: SHIPPED | OUTPATIENT
Start: 2022-12-29 | End: 2023-08-11

## 2022-12-29 NOTE — TELEPHONE ENCOUNTER
"Last Written Prescription Date:  12/22/21  Last Fill Quantity: 300,  # refills: 2   Last office visit provider:  7/12/22     Requested Prescriptions   Pending Prescriptions Disp Refills     BD EMMETT U/F 32G X 4 MM insulin pen needle 300 each 2     Sig: [BD ULTRA-FINE EMMETT PEN NEEDLE 32 GAUGE X 5/32\" NDLE] USE AS DIRECTED       Diabetic Supplies Protocol Passed - 12/28/2022  9:52 AM        Passed - Medication is active on med list        Passed - Patient is 18 years of age or older        Passed - Recent (6 mo) or future (30 days) visit within the authorizing provider's specialty     Patient had office visit in the last 6 months or has a visit in the next 30 days with authorizing provider.  See \"Patient Info\" tab in inbasket, or \"Choose Columns\" in Meds & Orders section of the refill encounter.                 Kirsten Bazan RN 12/29/22 2:47 PM  "

## 2022-12-30 ENCOUNTER — MYC MEDICAL ADVICE (OUTPATIENT)
Dept: PHARMACY | Facility: CLINIC | Age: 77
End: 2022-12-30

## 2022-12-30 DIAGNOSIS — Z79.4 TYPE 2 DIABETES MELLITUS WITH CHRONIC KIDNEY DISEASE, WITH LONG-TERM CURRENT USE OF INSULIN, UNSPECIFIED CKD STAGE (H): Primary | ICD-10-CM

## 2022-12-30 DIAGNOSIS — E11.22 TYPE 2 DIABETES MELLITUS WITH CHRONIC KIDNEY DISEASE, WITH LONG-TERM CURRENT USE OF INSULIN, UNSPECIFIED CKD STAGE (H): Primary | ICD-10-CM

## 2022-12-30 RX ORDER — INSULIN LISPRO 100 [IU]/ML
7 INJECTION, SOLUTION INTRAVENOUS; SUBCUTANEOUS
Qty: 15 ML | Refills: 3 | Status: SHIPPED | OUTPATIENT
Start: 2023-01-01 | End: 2023-04-14

## 2022-12-30 RX ORDER — INSULIN GLARGINE 300 U/ML
20 INJECTION, SOLUTION SUBCUTANEOUS AT BEDTIME
Qty: 15 ML | Refills: 3 | Status: SHIPPED | OUTPATIENT
Start: 2023-01-01 | End: 2024-02-20

## 2023-02-03 ENCOUNTER — TELEPHONE (OUTPATIENT)
Dept: FAMILY MEDICINE | Facility: CLINIC | Age: 78
End: 2023-02-03

## 2023-02-03 ENCOUNTER — OFFICE VISIT (OUTPATIENT)
Dept: FAMILY MEDICINE | Facility: CLINIC | Age: 78
End: 2023-02-03
Payer: MEDICARE

## 2023-02-03 VITALS
HEART RATE: 66 BPM | TEMPERATURE: 97.4 F | RESPIRATION RATE: 16 BRPM | SYSTOLIC BLOOD PRESSURE: 124 MMHG | DIASTOLIC BLOOD PRESSURE: 56 MMHG | OXYGEN SATURATION: 96 % | BODY MASS INDEX: 34.65 KG/M2 | WEIGHT: 270 LBS

## 2023-02-03 DIAGNOSIS — R35.0 URINE FREQUENCY: ICD-10-CM

## 2023-02-03 DIAGNOSIS — E11.22 TYPE 2 DIABETES MELLITUS WITH CHRONIC KIDNEY DISEASE, WITH LONG-TERM CURRENT USE OF INSULIN, UNSPECIFIED CKD STAGE (H): Primary | ICD-10-CM

## 2023-02-03 DIAGNOSIS — Z79.4 TYPE 2 DIABETES MELLITUS WITH CHRONIC KIDNEY DISEASE, WITH LONG-TERM CURRENT USE OF INSULIN, UNSPECIFIED CKD STAGE (H): Primary | ICD-10-CM

## 2023-02-03 DIAGNOSIS — N28.9 DISORDER OF KIDNEY AND URETER: ICD-10-CM

## 2023-02-03 DIAGNOSIS — I10 ESSENTIAL HYPERTENSION: ICD-10-CM

## 2023-02-03 DIAGNOSIS — N20.0 KIDNEY STONE: ICD-10-CM

## 2023-02-03 LAB
ANION GAP SERPL CALCULATED.3IONS-SCNC: 14 MMOL/L (ref 7–15)
BUN SERPL-MCNC: 19.4 MG/DL (ref 8–23)
CALCIUM SERPL-MCNC: 9.6 MG/DL (ref 8.8–10.2)
CHLORIDE SERPL-SCNC: 107 MMOL/L (ref 98–107)
CREAT SERPL-MCNC: 1.49 MG/DL (ref 0.67–1.17)
DEPRECATED HCO3 PLAS-SCNC: 22 MMOL/L (ref 22–29)
GFR SERPL CREATININE-BSD FRML MDRD: 48 ML/MIN/1.73M2
GLUCOSE SERPL-MCNC: 141 MG/DL (ref 70–99)
POTASSIUM SERPL-SCNC: 4.6 MMOL/L (ref 3.4–5.3)
SODIUM SERPL-SCNC: 143 MMOL/L (ref 136–145)

## 2023-02-03 PROCEDURE — 36415 COLL VENOUS BLD VENIPUNCTURE: CPT | Performed by: PHYSICIAN ASSISTANT

## 2023-02-03 PROCEDURE — 80048 BASIC METABOLIC PNL TOTAL CA: CPT | Performed by: PHYSICIAN ASSISTANT

## 2023-02-03 PROCEDURE — 99214 OFFICE O/P EST MOD 30 MIN: CPT | Performed by: PHYSICIAN ASSISTANT

## 2023-02-03 NOTE — TELEPHONE ENCOUNTER
I saw this patient today for an office visit for diabetic check.  [facility] said he needed to have a visit with his provider before his meter could be refilled.  Could you please call [facility] and let them know that he was seen today?    My office note should be done over the weekend.  Let me know if there is any specific form or anything else that I need to do to verify it.

## 2023-02-03 NOTE — PROGRESS NOTES
Subjective:    Goyo Cole is a 77 year old male who presents with chief complaint of diabetes check.  He needs this visit in order to keep getting his freestyle andrew meter.  He feels like this meter is working really well for him.  11/16/2022: A1c = 6.8%  He is currently taking insulin 7 units with meals, 20 units nightly, metformin 1000 mg twice a day, Actos 30 mg once daily.  He has no concerns regarding his diabetes.    Additionally, he believes he passed a kidney stone.  He brought it in and medicine bottle to show me.  It is a hard small piece of material, 2 mm in diameter.  He also notes that he is having a lot of urine frequency.  He has a history of BPH.    On a good note, his hiccups disappeared completely.  They never really figured out what the cause was.  He also felt like Ozempic was causing him dizziness.  He stopped Ozempic and dizziness has resolved.    Patient Active Problem List   Diagnosis     Fatigue     Pure hypercholesterolemia     Essential Hypertension     Renal Insufficiency     Proteinuria     Male Erectile Disorder     Esophageal Reflux     Degenerative joint disease (DJD) of hip     ELIOT on CPAP     Chewing tobacco use     Benign prostatic hyperplasia with lower urinary tract symptoms     Left Spermatocele     Class 1 obesity due to excess calories with serious comorbidity and body mass index (BMI) of 32.0 to 32.9 in adult     Type 2 diabetes mellitus with kidney complication, with long-term current use of insulin (H)     Stented coronary artery     Mitral valve stenosis     Atherosclerosis of native coronary artery of native heart without angina pectoris     Benign neoplasm of ascending colon       Current Outpatient Medications:      acetaminophen (TYLENOL) 500 MG tablet, Take 1,000 mg by mouth every 6 hours , Disp: , Rfl:      aspirin 81 MG EC tablet, [ASPIRIN 81 MG EC TABLET] Take 81 mg by mouth daily., Disp: , Rfl:      atorvastatin (LIPITOR) 40 MG tablet, [ATORVASTATIN  "(LIPITOR) 40 MG TABLET] TAKE 1 TABLET(40 MG) BY MOUTH AT BEDTIME, Disp: 90 tablet, Rfl: 3     BD EMMETT U/F 32G X 4 MM insulin pen needle, [BD ULTRA-FINE EMMETT PEN NEEDLE 32 GAUGE X 5/32\" NDLE] USE AS DIRECTED, Disp: 300 each, Rfl: 2     calcium citrate-vitamin D (CITRACAL) 315-200 MG-UNIT TABS per tablet, Take 2 tablets by mouth 2 times daily, Disp: , Rfl:      cholecalciferol, vitamin D3, (CHOLECALCIFEROL) 1,000 unit tablet, Take 2,000 Units by mouth daily, Disp: , Rfl:      Continuous Blood Gluc  (FREESTYLE ESTRADA 14 DAY READER) LAURENCE, 1 Application every 14 days, Disp: 2 each, Rfl: 11     Continuous Blood Gluc Sensor (FREESTYLE ESTRADA 14 DAY SENSOR) MISC, 1 each every 14 days, Disp: 6 each, Rfl: 11     CONTOUR NEXT TEST STRIPS strips, [CONTOUR NEXT TEST STRIPS STRIPS] CHECK BLOOD SUGAR BEFORE MEALS AND AT BEDTIME, Disp: 120 strip, Rfl: 10     insulin aspart (NOVOLOG FLEXPEN) 100 UNIT/ML pen, Inject 7 Units Subcutaneous 3 times daily (with meals), Disp: 15 mL, Rfl: 3     Insulin Degludec (TRESIBA) 100 UNIT/ML SOLN, Inject 20 Units Subcutaneous daily, Disp: 15 mL, Rfl: 1     insulin glargine U-300 (TOUJEO MAX SOLOSTAR) 300 UNIT/ML (2 units dial) pen, Inject 20 Units Subcutaneous At Bedtime, Disp: 15 mL, Rfl: 3     insulin lispro (HUMALOG KWIKPEN) 100 UNIT/ML (1 unit dial) KWIKPEN, Inject 7 Units Subcutaneous 3 times daily (before meals), Disp: 15 mL, Rfl: 3     lisinopril (ZESTRIL) 5 MG tablet, Take 5 mg by mouth daily, Disp: , Rfl:      metFORMIN (GLUCOPHAGE) 500 MG tablet, [METFORMIN (GLUCOPHAGE) 500 MG TABLET] TAKE 2 TABLETS(1000 MG) BY MOUTH TWICE DAILY WITH MEALS, Disp: 360 tablet, Rfl: 3     metoprolol tartrate (LOPRESSOR) 25 MG tablet, Take 12.5 mg by mouth, Disp: , Rfl:      MICROLET LANCET, [MICROLET LANCET] USE TO CHECK BLOOD SUGAR QID as needed, Disp: 100 each, Rfl: 3     miscellaneous medical supply (BLOOD PRESSURE CUFF) Misc, [MISCELLANEOUS MEDICAL SUPPLY (BLOOD PRESSURE CUFF) MISC] Please provide " patient with automatic blood pressure cuff.  Diagnosis: hypertension, Disp: 1 each, Rfl: 0     omeprazole 20 MG tablet, Take 20 mg by mouth daily, Disp: , Rfl:      pioglitazone (ACTOS) 30 MG tablet, Take 1 tablet (30 mg) by mouth daily, Disp: 90 tablet, Rfl: 3     SENNA-docusate sodium (SENNA S) 8.6-50 MG tablet, Take 1 tablet by mouth nightly as needed, Disp: , Rfl:      augmented betamethasone dipropionate (DIPROLENE AF) 0.05 % external cream, APPLY TO AFFECTED AREA ON THE BODY 1-2X DAILY FOR 2 WEEKS TAKE 1 WEEK BREAK REPEAT AS NEEDED FOR FLARES (Patient not taking: Reported on 10/10/2022), Disp: , Rfl:      mometasone (NASONEX) 50 MCG/ACT nasal spray, Spray 2 sprays into both nostrils daily (Patient not taking: Reported on 10/10/2022), Disp: 17 g, Rfl: 1     ticagrelor (BRILINTA) 90 MG tablet, Take 90 mg by mouth every 12 hours (Patient not taking: Reported on 2/3/2023), Disp: , Rfl:       Objective:   Allergies:  Patient has no known allergies.    Vitals:  Vitals:    02/03/23 1118   BP: 124/56   BP Location: Left arm   Patient Position: Sitting   Cuff Size: Adult Large   Pulse: 66   Resp: 16   Temp: 97.4  F (36.3  C)   TempSrc: Temporal   SpO2: 96%   Weight: 122.5 kg (270 lb)       Body mass index is 34.65 kg/m .    Vital signs reviewed.  General: Patient is alert and oriented x 3, in no apparent distress  Cardiac: regular rate and rhythm, no murmurs  Pulmonary: lungs clear to auscultation bilaterally, no crackles, rales, rhonchi, or wheezing noted    Results for orders placed or performed in visit on 02/03/23   Basic metabolic panel     Status: Abnormal   Result Value Ref Range    Sodium 143 136 - 145 mmol/L    Potassium 4.6 3.4 - 5.3 mmol/L    Chloride 107 98 - 107 mmol/L    Carbon Dioxide (CO2) 22 22 - 29 mmol/L    Anion Gap 14 7 - 15 mmol/L    Urea Nitrogen 19.4 8.0 - 23.0 mg/dL    Creatinine 1.49 (H) 0.67 - 1.17 mg/dL    Calcium 9.6 8.8 - 10.2 mg/dL    Glucose 141 (H) 70 - 99 mg/dL    GFR Estimate 48 (L)  >60 mL/min/1.73m2         Assessment and Plan:   1. Type 2 diabetes mellitus with chronic kidney disease, with long-term current use of insulin, unspecified CKD stage (H)  A1c: 6.8%  Eye Exam: Up-to-date  Foot Exam: Up-to-date  Smoking: No  On a statin: Yes  Blood Pressure: Well controlled  Microalbumin: Up-to-date  On ACE inhibitor: Yes  Chronic, stable, well controlled.  Continue with current treatment regimen of: metformin 1000 mg twice daily, Actos 30 mg daily, insulin lispro 7 units daily with meals, insulin shot glargine 20 units nightly.  He is following with our pharmacist as well.  Continue with freestyle andrew meter, as this is helping him to maintain better control of his blood sugars.  Follow-up with A1c check in 5 months.  - Basic metabolic panel    2. Essential hypertension  Chronic, stable.  Well-controlled on lisinopril and metoprolol.  Continue.  Screening labs ordered today and I will follow-up with results.  - Basic metabolic panel    3. Renal Insufficiency  Chronic, stable.  History of mild renal insufficiency.  Labs drawn today to assess current state.  - Basic metabolic panel    4. Urine frequency  Newer problem, worsening.  He has seen urology in the past.  This, combined with a possible kidney stone he passed, I think it will be good for him to see urology.  Referral placed today.  - Adult Urology  Referral; Future    5. Kidney stone  Thinks he had a kidney stone once many years ago.    With this new possible kidney stone, he does not really recall any significant pain, hematuria, or other symptoms.  Urology referral made follow-up.  - Adult Urology  Referral; Future    35 minutes spent on the date of the encounter doing chart review, history and exam, and documentation.         This dictation uses voice recognition software, which may contain typographical errors.

## 2023-02-04 ENCOUNTER — HEALTH MAINTENANCE LETTER (OUTPATIENT)
Age: 78
End: 2023-02-04

## 2023-02-06 ENCOUNTER — TELEPHONE (OUTPATIENT)
Dept: FAMILY MEDICINE | Facility: CLINIC | Age: 78
End: 2023-02-06
Payer: MEDICARE

## 2023-02-06 PROBLEM — D12.2 BENIGN NEOPLASM OF ASCENDING COLON: Status: ACTIVE | Noted: 2018-01-12

## 2023-02-06 PROBLEM — N50.89 MASS OF LEFT TESTICLE: Status: RESOLVED | Noted: 2021-01-21 | Resolved: 2023-02-06

## 2023-02-06 NOTE — TELEPHONE ENCOUNTER
Good morning,    This is the Diabetes Care Services team with [facility] with a request regarding this patient's CGM supplies.  We are currently working on the renewal for his Freestyle Rowan 14-Day CGM with Medicare. To be able to complete the renewal, we need a few things from you:    1. Please complete the chart notes from the appointment on 2/3/23. Please be sure to include the patient's insulin regimen in the notes before completing and signing the encounter.    2. Please send in a refill for the patient's Freestyle Rowan 14-Day Sensors. Please send for a quantity of #2 plus 5 refills.    Please reach out if there are any questions or concerns.    Thank you,    Diabetes Care Services Pharmacy Team  [facility]  Phone: [phone]  Fax: [phone]  Pool: Pharm Diabetes

## 2023-02-07 DIAGNOSIS — Z79.4 TYPE 2 DIABETES MELLITUS WITHOUT COMPLICATION, WITH LONG-TERM CURRENT USE OF INSULIN (H): ICD-10-CM

## 2023-02-07 DIAGNOSIS — E11.9 TYPE 2 DIABETES MELLITUS WITHOUT COMPLICATION, WITH LONG-TERM CURRENT USE OF INSULIN (H): ICD-10-CM

## 2023-02-07 PROBLEM — N20.0 KIDNEY STONE: Status: ACTIVE | Noted: 2023-02-07

## 2023-02-07 PROBLEM — R35.0 URINE FREQUENCY: Status: ACTIVE | Noted: 2023-02-07

## 2023-02-07 RX ORDER — FLASH GLUCOSE SENSOR
1 KIT MISCELLANEOUS
Qty: 2 EACH | Refills: 5 | Status: SHIPPED | OUTPATIENT
Start: 2023-02-07 | End: 2023-12-27

## 2023-02-07 NOTE — TELEPHONE ENCOUNTER
[facility] to [phone] and  3 times and each time call was disconnected.  Will try again

## 2023-02-07 NOTE — LETTER
February 7, 2023  Re: Goyo RODRIGUEZ Douglas  YOB: 1945    Dear Colleague,    Thank you for your referral to the [facility]. We have been unable to reach the patient after multiple contact attempts.      If you have any questions or concerns, please contact our office at Dept: [phone].        Sincerely,  [facility]

## 2023-02-20 NOTE — PROGRESS NOTES
Pt's wife spoke to me and said there is no need for this as pt seems to be doing okay. Completing task.

## 2023-03-30 ENCOUNTER — OFFICE VISIT (OUTPATIENT)
Dept: FAMILY MEDICINE | Facility: CLINIC | Age: 78
End: 2023-03-30
Payer: MEDICARE

## 2023-03-30 VITALS
HEART RATE: 72 BPM | SYSTOLIC BLOOD PRESSURE: 134 MMHG | OXYGEN SATURATION: 98 % | DIASTOLIC BLOOD PRESSURE: 70 MMHG | RESPIRATION RATE: 20 BRPM | WEIGHT: 269 LBS | BODY MASS INDEX: 34.52 KG/M2 | HEIGHT: 74 IN

## 2023-03-30 DIAGNOSIS — E11.22 TYPE 2 DIABETES MELLITUS WITH CHRONIC KIDNEY DISEASE, WITH LONG-TERM CURRENT USE OF INSULIN, UNSPECIFIED CKD STAGE (H): ICD-10-CM

## 2023-03-30 DIAGNOSIS — Z95.0 CARDIAC PACEMAKER IN SITU: ICD-10-CM

## 2023-03-30 DIAGNOSIS — Z09 HOSPITAL DISCHARGE FOLLOW-UP: Primary | ICD-10-CM

## 2023-03-30 DIAGNOSIS — Z79.4 TYPE 2 DIABETES MELLITUS WITH CHRONIC KIDNEY DISEASE, WITH LONG-TERM CURRENT USE OF INSULIN, UNSPECIFIED CKD STAGE (H): ICD-10-CM

## 2023-03-30 DIAGNOSIS — L03.114 CELLULITIS OF LEFT UPPER EXTREMITY: ICD-10-CM

## 2023-03-30 DIAGNOSIS — N28.9 DISORDER OF KIDNEY AND URETER: ICD-10-CM

## 2023-03-30 DIAGNOSIS — R60.0 EDEMA OF HAND: ICD-10-CM

## 2023-03-30 DIAGNOSIS — I10 ESSENTIAL HYPERTENSION: ICD-10-CM

## 2023-03-30 DIAGNOSIS — D69.6 THROMBOCYTOPENIA (H): ICD-10-CM

## 2023-03-30 LAB
ANION GAP SERPL CALCULATED.3IONS-SCNC: 12 MMOL/L (ref 7–15)
BUN SERPL-MCNC: 20.9 MG/DL (ref 8–23)
CALCIUM SERPL-MCNC: 9.5 MG/DL (ref 8.8–10.2)
CHLORIDE SERPL-SCNC: 102 MMOL/L (ref 98–107)
CREAT SERPL-MCNC: 1.63 MG/DL (ref 0.67–1.17)
DEPRECATED HCO3 PLAS-SCNC: 23 MMOL/L (ref 22–29)
ERYTHROCYTE [DISTWIDTH] IN BLOOD BY AUTOMATED COUNT: 12.6 % (ref 10–15)
GFR SERPL CREATININE-BSD FRML MDRD: 43 ML/MIN/1.73M2
GLUCOSE SERPL-MCNC: 155 MG/DL (ref 70–99)
HCT VFR BLD AUTO: 37.1 % (ref 40–53)
HGB BLD-MCNC: 11.9 G/DL (ref 13.3–17.7)
MCH RBC QN AUTO: 34.9 PG (ref 26.5–33)
MCHC RBC AUTO-ENTMCNC: 32.1 G/DL (ref 31.5–36.5)
MCV RBC AUTO: 109 FL (ref 78–100)
PLATELET # BLD AUTO: 194 10E3/UL (ref 150–450)
POTASSIUM SERPL-SCNC: 4.8 MMOL/L (ref 3.4–5.3)
RBC # BLD AUTO: 3.41 10E6/UL (ref 4.4–5.9)
SODIUM SERPL-SCNC: 137 MMOL/L (ref 136–145)
WBC # BLD AUTO: 7.3 10E3/UL (ref 4–11)

## 2023-03-30 PROCEDURE — 85027 COMPLETE CBC AUTOMATED: CPT | Performed by: PHYSICIAN ASSISTANT

## 2023-03-30 PROCEDURE — 99215 OFFICE O/P EST HI 40 MIN: CPT | Performed by: PHYSICIAN ASSISTANT

## 2023-03-30 PROCEDURE — 80048 BASIC METABOLIC PNL TOTAL CA: CPT | Performed by: PHYSICIAN ASSISTANT

## 2023-03-30 PROCEDURE — 36415 COLL VENOUS BLD VENIPUNCTURE: CPT | Performed by: PHYSICIAN ASSISTANT

## 2023-03-30 ASSESSMENT — ENCOUNTER SYMPTOMS
JAUNDICE: 0
SKIN CHANGES: 0
ABDOMINAL PAIN: 0
POLYPHAGIA: 0
POLYDIPSIA: 0
EXERCISE INTOLERANCE: 0
POOR WOUND HEALING: 0
RECTAL PAIN: 0
NAUSEA: 0
CONSTIPATION: 1
HALLUCINATIONS: 0
NIGHT SWEATS: 1
BLOATING: 0
HEARTBURN: 1
HYPERTENSION: 1
SLEEP DISTURBANCES DUE TO BREATHING: 0
NAIL CHANGES: 0
ALTERED TEMPERATURE REGULATION: 1
FEVER: 0
INCREASED ENERGY: 0
BOWEL INCONTINENCE: 0
LIGHT-HEADEDNESS: 0
BLOOD IN STOOL: 0
FATIGUE: 1
DIARRHEA: 0
WEIGHT LOSS: 0
HEMATURIA: 0
DECREASED APPETITE: 0
VOMITING: 0
SYNCOPE: 0
FLANK PAIN: 0
HYPOTENSION: 0
PALPITATIONS: 0
WEIGHT GAIN: 0
DIFFICULTY URINATING: 0
CHILLS: 1
ORTHOPNEA: 0
LEG PAIN: 0
DYSURIA: 0

## 2023-03-30 NOTE — PROGRESS NOTES
Assessment & Plan     1. Hospital discharge follow-up  2. Cardiac pacemaker in situ  This was the initial reason for his visit.  Overall doing well.  No concerning symptoms.  He has had follow-up with cardiology since pacemaker placement.  Follow-up as needed.    3. Cellulitis of left upper extremity/hand  4. Edema of hand  Acute, minimal improvement.  This was actually the primary concern of the visit today.  He is on day 6 out of 7 of Keflex.  Did have some fluid drained at the ER and labs were done, no concerning findings.  Patient notes minimal improvement.  I am concerned because he still has a fair amount of edema in all the fingers, hand, and wrist.  No immediate red flags today that would necessitate ER visit today.  However, I think he should have this followed up within the next few days.  Today is Thursday.  He says he cannot see Ortho on Friday, because he has an appointment with his heart doctor.  Appointment was made with Ortho for Monday.  I reviewed specific reasons with patient and his wife, in which he should go to the ER over the weekend, such as worsening swelling, worsening pain, significant numbness or tingling in his hand, or any other concerning findings.  Patient and his wife understand this and agree with plan.  We discussed other conservative management to continue.    I am hoping that edema and infection are indeed resolving, just very slowly.  However, based on exam findings and location of symptoms (hand and fingers), I think we need to watch this very closely.  He only has 1 day left of Keflex.  Because of the concern for lack of symptom resolution, I did extend Keflex for another 7 days.  Prescription sent.  I asked him to follow-up with Ortho on Monday.  If Ortho thinks he can stop the medication at that time, that is fine.  Creatinine clearance calculated today to be 72 mL/min, therefore no dose adjustment needed with Keflex.  - CBC with platelets  - Orthopedic  Referral;  Future  - cephALEXin (KEFLEX) 250 MG capsule; Take 3 capsules (750 mg) by mouth 4 times daily for 7 days  Dispense: 84 capsule; Refill: 0    5. Essential hypertension  Chronic, stable.  Well-controlled on present medications.  Hospital discharge recommended restarting lisinopril , statin, aspirin, and metoprolol.  - Basic metabolic panel    6. Renal Insufficiency  Chronic, stable.  Creatinine had gone up a little bit when he was in the hospital.  Recheck a few days after discharge showed slight improvement.  Recheck ordered again today to monitor.  - Basic metabolic panel    7. Thrombocytopenia (H)  Acute, resolved.  Has not really had a problem with this before.  Had mildly low platelets around the time of his hospitalization and ER visits.  Recheck today was normal.  Reassurance provided.  Can recheck this again in the future.  He has also had mild chronic anemia, stable.  Will continue to monitor this for now.  - CBC with platelets    8. Type 2 diabetes mellitus with chronic kidney disease, with long-term current use of insulin, unspecified CKD stage (H)  Chronic, well controlled.  This was not the primary reason for his visit today.  Metformin was stopped in the hospital due to elevated creatinine.  Has not restarted that yet.  Is taking mealtime and overnight insulin.  In types of insulin it had to be changed recently because of insurance reasons.  He has also not been able to restart using his freestyle andrew meter, so has not been checking his sugars.  His wife wants to make sure that it is okay to use the meter with his pacemaker, and see if there is a specific arm or area that they should use.  She is going be talking to them soon.  Continue to hold metformin for now.  Hospitalist recommended considering restarting metformin at a lower dose (500 mg twice daily) when kidney function improves/stabilizes.         MED REC REQUIRED  Post Medication Reconciliation Status:  Discharge medications reconciled,  "continue medications without change    BMI:   Estimated body mass index is 34.54 kg/m  as calculated from the following:    Height as of this encounter: 1.88 m (6' 2\").    Weight as of this encounter: 122 kg (269 lb).   Weight management plan: Discussed healthy diet and exercise guidelines      Lillie Nance is a 77 year old, presenting for the following health issues:  Hospital F/U (F/U from Hospital for Cellulitis and Pacemaker Implant)    Additional Questions 3/30/2023   Roomed by Gertrude   Accompanied by Spouse     Patient Reported Additional Medications 3/30/2023   Patient reports taking the following new medications Metoprolol Succinate 25mg tabs-12.5mg daily       [facility] admit 3/20/2023, discharged 3/22/2023.  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  Bradycardia was discovered when he was undergoing a planned stress test.  He was sent to the ER and then admitted.  History of coronary stenting in January 2022.  He was having symptoms such as shortness of breath and lightheadedness with minimal activity.  He has had follow-up with cardiology since then.    He also had an ER visit on 3/24/2023.  He presented with left wrist pain with some radiation of pain to the forearm x1 day.  They attempted to aspirate area of swelling in the wrist, eventually got it done with what sounds like fluoroscopy.  Labs sent, no significant concerns for infection or septic arthritis.  He did have Ortho consult at the ER, and based on labs, diagnostic studies, and exam at that time they did not feel he needed further Ortho follow-up.    He was given high-dose Keflex for 7 days.  He has about 1 day left of that.  He feels like hand pain has improved, redness has improved a little bit, but swelling has not really improved much.  He used to be unable to flex any of his fingers, now he can flex all of his fingers and thumb a little bit, still unable to make anything close to " "a tight fist.  Still unclear as to what the cause of this cellulitis was.  No numbness or tingling in his fingers.      I reviewed discharge summary from 3/20/2023.  I reviewed emergency room report from 3/24/2023.  I reviewed cardiology office note from 3/22/23.  I reviewed left wrist x-ray report from 3/24/2023.  I reviewed ultrasound report of soft tissue from 3/24/2023.  I reviewed all labs done on 3/24/2023, including body fluid, crystals, cell count, BMP, CBC, CRP, ESR.      Hospital Follow-up Visit:    Hospital/Nursing Home/IP Rehab Facility: [facility]  Date of Admission: 3/20/23  Date of Discharge: 3/22/23  Reason(s) for Admission: Bradycardia    Was your hospitalization related to COVID-19? No   Problems taking medications regularly:  None  Medication changes since discharge: None  Problems adhering to non-medication therapy:  None    Summary of hospitalization:  CareEverywhere information obtained and reviewed  Diagnostic Tests/Treatments reviewed.  Follow up needed: none  Other Healthcare Providers Involved in Patient s Care:         Cardiology  Update since discharge: improved.         Plan of care communicated with patient and family           Objective    /70 (BP Location: Left arm, Patient Position: Sitting, Cuff Size: Adult Large)   Pulse 72   Resp 20   Ht 1.88 m (6' 2\")   Wt 122 kg (269 lb)   SpO2 98%   BMI 34.54 kg/m    Body mass index is 34.54 kg/m .  Physical Exam   General: Alert and oriented x3, no apparent distress  Cardiac: Regular rate and rhythm, no murmurs  Pulmonary: Lungs clear to auscultation bilaterally, No crackles, rales, rhonchi, or wheezing noted  Skin: Left fingers hand and wrist are moderately edematous, fingers appear slightly hypopigmented today (due to edema?)  It seems like he had a slightly decreased capillary refill in his left fingers, but when checking on the right side, capillary refill seems fairly equal in both hands.  No pain with palpation of hand, " wrist, or fingers.  Mild induration in the fingers, hand, and wrist, extending up toward the ulnar styloid.  Able to flex fingers and thumb slightly, but only to about 25%, nowhere near able to make a tight fist.  Strength in fingers appears normal.  He does have some limited range of motion in the fingers and thumb.  No significant erythema noted on skin in the affected areas        66 minutes spent on the date of the encounter doing chart review, history and exam, and documentation.

## 2023-03-30 NOTE — TELEPHONE ENCOUNTER
Action March 30, 2023 3:52 PM MT   Action Taken Sent a request for imaging from  [phone]. *Resolved*     DIAGNOSIS: cellulitis in left hand, not improving   APPOINTMENT DATE: 03/31/2023   NOTES STATUS DETAILS   OFFICE NOTE from referring provider Internal 03/30/2023 - Magalis Tucker PA-C [facility]    DISCHARGE REPORT from the ER Care Everywhere 03/24/2023 - [facility] ED   MEDICATION LIST Internal  Care Everywhere    INJECTIONS DONE IN RADIOLOGY PACS HP:  03/24/2023 - LT Wrist Aspiration   XRAYS (IMAGES & REPORTS) PACS HP:  03/24/2023 - LT Wrist

## 2023-03-31 ENCOUNTER — OFFICE VISIT (OUTPATIENT)
Dept: ORTHOPEDICS | Facility: CLINIC | Age: 78
End: 2023-03-31
Attending: PHYSICIAN ASSISTANT
Payer: MEDICARE

## 2023-03-31 ENCOUNTER — LAB (OUTPATIENT)
Dept: LAB | Facility: CLINIC | Age: 78
End: 2023-03-31
Payer: MEDICARE

## 2023-03-31 ENCOUNTER — PRE VISIT (OUTPATIENT)
Dept: ORTHOPEDICS | Facility: CLINIC | Age: 78
End: 2023-03-31

## 2023-03-31 ENCOUNTER — CONFIDENTIAL (OUTPATIENT)
Dept: ORTHOPEDICS | Facility: CLINIC | Age: 78
End: 2023-03-31

## 2023-03-31 DIAGNOSIS — L03.114 CELLULITIS OF LEFT UPPER EXTREMITY: ICD-10-CM

## 2023-03-31 DIAGNOSIS — R60.0 EDEMA OF HAND: ICD-10-CM

## 2023-03-31 PROBLEM — Z95.0 CARDIAC PACEMAKER IN SITU: Status: ACTIVE | Noted: 2023-03-31

## 2023-03-31 LAB
CRP SERPL-MCNC: 3.95 MG/L
ERYTHROCYTE [SEDIMENTATION RATE] IN BLOOD BY WESTERGREN METHOD: 34 MM/HR (ref 0–20)

## 2023-03-31 PROCEDURE — 99204 OFFICE O/P NEW MOD 45 MIN: CPT | Performed by: PHYSICIAN ASSISTANT

## 2023-03-31 PROCEDURE — 85652 RBC SED RATE AUTOMATED: CPT | Performed by: PATHOLOGY

## 2023-03-31 PROCEDURE — 36415 COLL VENOUS BLD VENIPUNCTURE: CPT | Performed by: PATHOLOGY

## 2023-03-31 PROCEDURE — 86140 C-REACTIVE PROTEIN: CPT | Performed by: PATHOLOGY
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
## 2023-03-31 NOTE — NURSING NOTE
Reason For Visit:   Chief Complaint   Patient presents with     Consult     Cellulitis of the left hand not improving.       Primary MD: Magalis Tucker  Ref. MD: Garrett    Age: 77 year old    ?  No      There were no vitals taken for this visit.      Pain Assessment  Patient Currently in Pain: Yes  0-10 Pain Scale: 2  Primary Pain Location: Hand (Left)  Pain Descriptors: Intermittent, Sharp    Hand Dominance Evaluation  Hand Dominance: Right          QuickDASH Assessment  QuickDASH Main 3/30/2023   1. Open a tight or new jar. Unable   2. Do heavy household chores (e.g., wash walls, floors) Unable   3. Carry a shopping bag or briefcase. Unable   4. Wash your back. Unable   5. Use a knife to cut food. Severe difficulty   6. Recreational activities in which you take some force or impact through your arm, shoulder or hand (e.g., golf, hammering, tennis, etc.). Unable   7. During the past week, to what extent has your arm, shoulder or hand problem interfered with your normal social activities with family, friends, neighbours or groups? Extremely   8. During the past week, were you limited in your work or other regular daily activities as a result of your arm, shoulder or hand problem? Very limited   9. Arm, shoulder or hand pain. Severe   10.Tingling (pins and needles) in your arm,shoulder or hand. Moderate   11. During the past week, how much difficulty have you had sleeping because of the pain in your arm, shoulder or hand? Mild difficulty   Quickdash Ability Score 81.82          Current Outpatient Medications   Medication Sig Dispense Refill     acetaminophen (TYLENOL) 500 MG tablet Take 1,000 mg by mouth every 6 hours        aspirin 81 MG EC tablet [ASPIRIN 81 MG EC TABLET] Take 81 mg by mouth daily.       atorvastatin (LIPITOR) 40 MG tablet [ATORVASTATIN (LIPITOR) 40 MG TABLET] TAKE 1 TABLET(40 MG) BY MOUTH AT BEDTIME 90 tablet 3     BD EMMETT U/F 32G X 4 MM insulin pen needle [BD ULTRA-FINE EMMETT  "PEN NEEDLE 32 GAUGE X 5/32\" NDLE] USE AS DIRECTED 300 each 2     calcium citrate-vitamin D (CITRACAL) 315-200 MG-UNIT TABS per tablet Take 2 tablets by mouth 2 times daily       cephALEXin (KEFLEX) 250 MG capsule Take 3 capsules (750 mg) by mouth 4 times daily for 7 days 84 capsule 0     cholecalciferol, vitamin D3, (CHOLECALCIFEROL) 1,000 unit tablet Take 2,000 Units by mouth daily       Continuous Blood Gluc  (FREESTYLE ESTRADA 14 DAY READER) LAURENCE 1 Application every 14 days 2 each 11     Continuous Blood Gluc Sensor (FREESTYLE ESTRADA 14 DAY SENSOR) MISC 1 each every 14 days 2 each 5     CONTOUR NEXT TEST STRIPS strips [CONTOUR NEXT TEST STRIPS STRIPS] CHECK BLOOD SUGAR BEFORE MEALS AND AT BEDTIME 120 strip 10     insulin aspart (NOVOLOG FLEXPEN) 100 UNIT/ML pen Inject 7 Units Subcutaneous 3 times daily (with meals) (Patient not taking: Reported on 3/30/2023) 15 mL 3     Insulin Degludec (TRESIBA) 100 UNIT/ML SOLN Inject 20 Units Subcutaneous daily (Patient not taking: Reported on 3/30/2023) 15 mL 1     insulin glargine U-300 (TOUJEO MAX SOLOSTAR) 300 UNIT/ML (2 units dial) pen Inject 20 Units Subcutaneous At Bedtime 15 mL 3     insulin lispro (HUMALOG KWIKPEN) 100 UNIT/ML (1 unit dial) KWIKPEN Inject 7 Units Subcutaneous 3 times daily (before meals) 15 mL 3     lisinopril (ZESTRIL) 5 MG tablet Take 10 mg by mouth daily       metFORMIN (GLUCOPHAGE) 500 MG tablet [METFORMIN (GLUCOPHAGE) 500 MG TABLET] TAKE 2 TABLETS(1000 MG) BY MOUTH TWICE DAILY WITH MEALS (Patient not taking: Reported on 3/30/2023) 360 tablet 3     metoprolol tartrate (LOPRESSOR) 25 MG tablet Take 12.5 mg by mouth (Patient not taking: Reported on 3/30/2023)       MICROLET LANCET [MICROLET LANCET] USE TO CHECK BLOOD SUGAR QID as needed 100 each 3     miscellaneous medical supply (BLOOD PRESSURE CUFF) Misc [MISCELLANEOUS MEDICAL SUPPLY (BLOOD PRESSURE CUFF) MISC] Please provide patient with automatic blood pressure cuff.  Diagnosis: " hypertension 1 each 0     omeprazole 20 MG tablet Take 20 mg by mouth daily       pioglitazone (ACTOS) 30 MG tablet Take 1 tablet (30 mg) by mouth daily 90 tablet 3     SENNA-docusate sodium (SENNA S) 8.6-50 MG tablet Take 1 tablet by mouth nightly as needed (Patient not taking: Reported on 3/30/2023)         Allergies   Allergen Reactions     Ketamine Other (See Comments)     hallucinations       JERRY MILAN, ATC

## 2023-04-06 NOTE — PROGRESS NOTES
Medication Therapy Management (MTM) Encounter    ASSESSMENT:                            Medication Adherence/Access: No concerns identified.       Type 2 Diabetes: Last A1C above goal <8%. CGM readings above goal <180. Metformin was held due to low renal function. Never got below GFR of 30. And now in the 40s. Reasonable to restart metformin at reduced dose and continue to monitor renal function. Will also provide some insulin adjustments for patient to make at home should blood glucose continue to run high or drop low.      CAD: Last LDL at goal <70. BP at goal <130/80. Pulse at goal . Appropriately using high intensity statin and aspirin. No longer dizzy. Energy levels improving.       Low Vitamin D: WNL with target goal of 45-60. Is using a supplement.        PLAN:                            1. Restart Metformin 500 mg at 1 tab twice daily.   2. After 4/14,  a. If your morning (fasting) blood sugars are above 180, dial up your Toujeo by 2 units every 3 days until morning sugars come down to 180.   b. If you have 3 episodes of blood sugars below 80, decrease your Toujeo by 2 units. Repeat for each additional set of 3 low events.       Follow-up: Return in about 4 weeks (around 5/5/2023) for Medication Management Pharmacist, in person.      SUBJECTIVE/OBJECTIVE:                          Goyo Cole is a 76 year old male coming in for a follow-up visit. He was referred to me from Magalis Tucker PA-C. Patient was accompanied by wife.. Today's visit is a follow-up MTM visit from 08/09/2022.     Reason for visit: Referred for diabetes management.      Allergies/ADRs: Reviewed in chart  Past Medical History: Reviewed in chart  Tobacco: He reports that he has quit smoking. He quit smokeless tobacco use about 7 years ago.  His smokeless tobacco use included chew. Still using chewing tobacco - working on reducing.   Alcohol: Last drink was at [employer]. Sometimes, he'll go out and have a lot to drink.        Medication Adherence/Access: Patient's wife helps to set up with medications.     Sets all the vitamins in an old pill vial for the day. Adds in daily medicines.       Type 2 Diabetes: Prescribed Pioglitazone 30 mg daily, Toujeo 20 units daily and Novolog 7 units three times daily.     Given baseline 7 units.   Above 180, gives 1 extra units.   Above 250, gives 2 units   And 22 units of Toujeo.    Metformin discontinued at 3/20 hospitalization due to CKD.      Blood sugar monitoring: Continuous Glucose Monitor. Continues to feel the CGM isn't very accurate.                Symptoms of low blood sugar? None  Symptoms of high blood sugar? none    Diet/Exercise: Has noticed increased appetite and also increased weight since the last visit. Now around 260# declined recheck today. Has been focusing on portion control with sweets. Having 3 cookies vs 5-6 at a time.       Hemoglobin A1C   Date Value Ref Range Status   11/16/2022 6.8 (H) 0.0 - 5.6 % Final     Comment:     Normal <5.7%   Prediabetes 5.7-6.4%    Diabetes 6.5% or higher     Note: Adopted from ADA consensus guidelines.   07/12/2022 8.5 (H) 0.0 - 5.6 % Final     Comment:     Normal <5.7%   Prediabetes 5.7-6.4%    Diabetes 6.5% or higher     Note: Adopted from ADA consensus guidelines.   01/04/2022 7.4 (H) 0.0 - 5.6 % Final     Comment:     Normal <5.7%   Prediabetes 5.7-6.4%    Diabetes 6.5% or higher     Note: Adopted from ADA consensus guidelines.      Microalbumin Urine mg/dL   Date Value Ref Range Status   01/21/2021 1.79 0.00 - 1.99 mg/dL Final      Creatinine Urine mg/dL   Date Value Ref Range Status   11/16/2022 94.8 mg/dL Final     Comment:     The reference ranges have not been established in urine creatinine. The results should be integrated into the clinical context for interpretation.     LDL Cholesterol Calculated   Date Value Ref Range Status   11/16/2022 30 <=100 mg/dL Final     LDL Cholesterol Direct   Date Value Ref Range Status    02/13/2020 42 <=129 mg/dl Final     Creatinine   Date Value Ref Range Status   03/30/2023 1.63 (H) 0.67 - 1.17 mg/dL Final     GFR Estimate   Date Value Ref Range Status   03/30/2023 43 (L) >60 mL/min/1.73m2 Final     Comment:     eGFR calculated using 2021 CKD-EPI equation.   07/09/2021 47 (L) >60 mL/min/1.73m2 Final     GFR Estimate If Black   Date Value Ref Range Status   07/09/2021 57 (L) >60 mL/min/1.73m2 Final         CAD: Prescribed Metoprolol Succinate 25 mg 0.5 tab daily, Lisinopril 10 mg daily, Aspirin 81 mg daily, Atorvastatin 40 mg daily.     Brilinta discontinued by cardiology on 3/1. Was switched from metoprolol to tartrate to metoprolol succinate.  Seen in the ED on 3/16 with bradycardia, and dose was reduced to 12.5 mg daily.  Been hospitalized at [facility] from 3/20 to 3/22.  Had pacemaker placed on 3/21.  Beta-blocker was resumed at discharge.       Dizziness has improved. Starting to get more energy back.     Recent Labs   Lab Test 04/12/21  0740 02/13/20  1133 06/01/18  1613 02/06/18  0818   CHOL 95  --   --  105   HDL 33*  --   --  35*   LDL 46 42   < > 49   TRIG 82  --   --  103    < > = values in this interval not displayed.       BP Readings from Last 3 Encounters:   04/07/23 127/74   03/30/23 134/70   02/03/23 124/56      Pulse Readings from Last 3 Encounters:   04/07/23 73   03/30/23 72   02/03/23 66         Low Vitamin D: Prescribed Vitamin D3 2000 units daily .     Vitamin D Deficiency Screening Results:  Lab Results   Component Value Date    VITDT 56 03/08/2022         Today's Vitals: /74   Pulse 73   ----------------    I spent 40 minutes with this patient today. All changes were made via collaborative practice agreement with Magalis Tucker PA-C. A copy of the visit note was provided to the patient's provider(s).    The patient was given a summary of these recommendations.     Jake Ortega, PharmD  Medication Therapy Management (MTM) Pharmacist  [facility]       Medication Therapy Recommendations  Coronary artery disease involving autologous artery coronary bypass graft without angina pectoris    Current Medication: ticagrelor (BRILINTA) 90 MG tablet (Discontinued)   Rationale: Cannot afford medication product - Cost - Adherence   Recommendation: Change Medication - clopidogrel 75 MG tablet   Status: Accepted per Provider         Type 2 diabetes mellitus with kidney complication, with long-term current use of insulin (H)    Current Medication: insulin glargine U-300 (TOUJEO MAX SOLOSTAR) 300 UNIT/ML (2 units dial) pen   Rationale: Synergistic therapy - Needs additional medication therapy - Indication   Recommendation: Start Medication - metFORMIN 500 MG tablet   Status: Accepted per CPA

## 2023-04-07 ENCOUNTER — ALLIED HEALTH/NURSE VISIT (OUTPATIENT)
Dept: PHARMACY | Facility: CLINIC | Age: 78
End: 2023-04-07
Payer: COMMERCIAL

## 2023-04-07 VITALS — SYSTOLIC BLOOD PRESSURE: 127 MMHG | DIASTOLIC BLOOD PRESSURE: 74 MMHG | HEART RATE: 73 BPM

## 2023-04-07 DIAGNOSIS — I25.810 CORONARY ARTERY DISEASE INVOLVING AUTOLOGOUS ARTERY CORONARY BYPASS GRAFT WITHOUT ANGINA PECTORIS: ICD-10-CM

## 2023-04-07 DIAGNOSIS — E11.22 TYPE 2 DIABETES MELLITUS WITH CHRONIC KIDNEY DISEASE, WITH LONG-TERM CURRENT USE OF INSULIN, UNSPECIFIED CKD STAGE (H): Primary | ICD-10-CM

## 2023-04-07 DIAGNOSIS — Z79.4 TYPE 2 DIABETES MELLITUS WITH CHRONIC KIDNEY DISEASE, WITH LONG-TERM CURRENT USE OF INSULIN, UNSPECIFIED CKD STAGE (H): Primary | ICD-10-CM

## 2023-04-07 DIAGNOSIS — E55.9 VITAMIN D DEFICIENCY: ICD-10-CM

## 2023-04-07 PROCEDURE — 99207 PR NO CHARGE LOS: CPT | Performed by: PHARMACIST

## 2023-04-07 RX ORDER — LISINOPRIL 10 MG/1
10 TABLET ORAL DAILY
COMMUNITY
Start: 2023-03-13

## 2023-04-07 NOTE — PATIENT INSTRUCTIONS
"Recommendations from today's MTM visit:                                                         Restart Metformin 500 mg at 1 tab twice daily.   After 4/14,  If your morning (fasting) blood sugars are above 180, dial up your Toujeo by 2 units every 3 days until morning sugars come down to 180.   If you have 3 episodes of blood sugars below 80, decrease your Toujeo by 2 units. Repeat for each additional set of 3 low events.     Follow-up: Return in about 4 weeks (around 5/5/2023) for Medication Management Pharmacist, in person.    It was great speaking with you today.  I value your experience and would be very thankful for your time in providing feedback in our clinic survey. In the next few days, you may receive an email or text message from [employer] with a link to a survey related to your  clinical pharmacist.\"     To schedule another MTM appointment, please call the clinic directly or you may call the MTM scheduling line at [phone] or toll-free at [phone].     My Clinical Pharmacist's contact information:                                                      Please feel free to contact me with any questions or concerns you have.      Jake Ortega, PharmD  Medication Therapy Management (MTM) Pharmacist  [facility]       "
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
## 2023-04-10 ENCOUNTER — MYC MEDICAL ADVICE (OUTPATIENT)
Dept: FAMILY MEDICINE | Facility: CLINIC | Age: 78
End: 2023-04-10
Payer: MEDICARE

## 2023-04-10 NOTE — TELEPHONE ENCOUNTER
Contacted patient and patient's  ans patient has remaining refills. They would like remaining refills to be sent to [facility].  Patient's wife will contact [facility] to transfer medication.    Also per Magalis's clinic note on 3/30/23 patient to contact [facility] as they ordered hand therapy for patient which is scheduled on 4/24/23.  They will call back if refill or appointment needed.    Message sent to Magalis BARRERA for LYNNE Fields RN  [facility]      Goyo AZEVEDO [facility]/Ob Support Pool (supporting Magalis Tucker PA-C)     Derek Blancas.  I thought we might be in the clear with Goyo's cellulitis, but this morning he is having a lot of pain in his left wrist again. It looks to be slightly swollen. He took his last dose of antibiotic Sat morning. Do you think he should be back on the antibiotic? I've been told cellulitis can be difficult to clear up. What are your thoughts?  He also will need a refill of his pioglitazone to be filled at [facility].  Thank you so much, Magalis.  Cate Huynh Refills Start End ADALGISA   pioglitazone (ACTOS) 30 MG tablet 90 tablet 3 10/4/2022  No   Sig - Route: Take 1 tablet (30 mg) by mouth daily - Oral

## 2023-04-12 ENCOUNTER — LAB (OUTPATIENT)
Dept: LAB | Facility: CLINIC | Age: 78
End: 2023-04-12
Payer: MEDICARE

## 2023-04-12 DIAGNOSIS — L03.114 CELLULITIS OF LEFT UPPER EXTREMITY: ICD-10-CM

## 2023-04-12 LAB
BASOPHILS # BLD AUTO: 0 10E3/UL (ref 0–0.2)
BASOPHILS NFR BLD AUTO: 1 %
CRP SERPL-MCNC: 5.3 MG/L
EOSINOPHIL # BLD AUTO: 0.1 10E3/UL (ref 0–0.7)
EOSINOPHIL NFR BLD AUTO: 2 %
ERYTHROCYTE [DISTWIDTH] IN BLOOD BY AUTOMATED COUNT: 12.8 % (ref 10–15)
ERYTHROCYTE [SEDIMENTATION RATE] IN BLOOD BY WESTERGREN METHOD: 37 MM/HR (ref 0–15)
HCT VFR BLD AUTO: 36.1 % (ref 40–53)
HGB BLD-MCNC: 11.8 G/DL (ref 13.3–17.7)
IMM GRANULOCYTES # BLD: 0.2 10E3/UL
IMM GRANULOCYTES NFR BLD: 2 %
LYMPHOCYTES # BLD AUTO: 2 10E3/UL (ref 0.8–5.3)
LYMPHOCYTES NFR BLD AUTO: 23 %
MCH RBC QN AUTO: 35.5 PG (ref 26.5–33)
MCHC RBC AUTO-ENTMCNC: 32.7 G/DL (ref 31.5–36.5)
MCV RBC AUTO: 109 FL (ref 78–100)
MONOCYTES # BLD AUTO: 1.3 10E3/UL (ref 0–1.3)
MONOCYTES NFR BLD AUTO: 15 %
NEUTROPHILS # BLD AUTO: 5 10E3/UL (ref 1.6–8.3)
NEUTROPHILS NFR BLD AUTO: 58 %
PLATELET # BLD AUTO: 200 10E3/UL (ref 150–450)
RBC # BLD AUTO: 3.32 10E6/UL (ref 4.4–5.9)
WBC # BLD AUTO: 8.6 10E3/UL (ref 4–11)

## 2023-04-12 PROCEDURE — 85652 RBC SED RATE AUTOMATED: CPT

## 2023-04-12 PROCEDURE — 86140 C-REACTIVE PROTEIN: CPT

## 2023-04-12 PROCEDURE — 85025 COMPLETE CBC W/AUTO DIFF WBC: CPT

## 2023-04-12 PROCEDURE — 36415 COLL VENOUS BLD VENIPUNCTURE: CPT

## 2023-04-13 DIAGNOSIS — L03.114 CELLULITIS OF LEFT UPPER EXTREMITY: Primary | ICD-10-CM

## 2023-04-13 NOTE — CONFIDENTIAL NOTE
Brief Orthopedic Surgery Note    Patient last seen on 3/31/2023 for cellulitis follow-up.  At that time he was currently on Keflex, symptoms improving.  Recommended referral to hand therapy for range of motion.  Due to scheduling patient was not able to schedule this.  Patient reached out via [employer] earlier this week with concerns for pain to the left hand.  He completed his antibiotics over the weekend, and reports that while on antibiotics his symptoms had significantly improved, swelling decreased, pain well controlled.  After discontinuing antibiotics pain worsened.  Spoke to the patient on the phone who did not denied any erythema of the extremity.  Edema significantly improved since last visit on 3/31, however did slightly worsen over the weekend.  Reports pain from the forearm to the fingertips.  Able to move the wrist, reports increased wrist pain yesterday, however this is improved today.    Patient obtain laboratory studies yesterday, CRP mildly elevated to 5.3, ESR mildly elevated to 37, no leukocytosis.  Patient denies fevers or chills.    Sent 14-day course of Keflex 250 mg 4 times daily to preferred pharmacy.  Reviewed signs and symptoms of worsening infection as well as septic joint that would prompt a return to the clinic or ER sooner.  We will have patient follow-up on Monday 4/24 for recheck, as I am out of town next week.  Knows to contact us if worsening despite antibiotics.    All questions were answered and patient and his wife are in agreement with plan.    ANNY ALEXANDRA PA-C  4/13/2023 3:52 PM   Orthopaedic Surgery

## 2023-04-14 ENCOUNTER — MYC MEDICAL ADVICE (OUTPATIENT)
Dept: PHARMACY | Facility: CLINIC | Age: 78
End: 2023-04-14
Payer: MEDICARE

## 2023-04-14 DIAGNOSIS — Z79.4 TYPE 2 DIABETES MELLITUS WITH CHRONIC KIDNEY DISEASE, WITH LONG-TERM CURRENT USE OF INSULIN, UNSPECIFIED CKD STAGE (H): ICD-10-CM

## 2023-04-14 DIAGNOSIS — E11.22 TYPE 2 DIABETES MELLITUS WITH CHRONIC KIDNEY DISEASE, WITH LONG-TERM CURRENT USE OF INSULIN, UNSPECIFIED CKD STAGE (H): ICD-10-CM

## 2023-04-14 RX ORDER — INSULIN LISPRO 100 [IU]/ML
7 INJECTION, SOLUTION INTRAVENOUS; SUBCUTANEOUS
Qty: 30 ML | Refills: 3 | Status: SHIPPED | OUTPATIENT
Start: 2023-04-14 | End: 2024-05-29

## 2023-04-24 ENCOUNTER — ANCILLARY PROCEDURE (OUTPATIENT)
Dept: GENERAL RADIOLOGY | Facility: CLINIC | Age: 78
End: 2023-04-24
Attending: STUDENT IN AN ORGANIZED HEALTH CARE EDUCATION/TRAINING PROGRAM
Payer: MEDICARE

## 2023-04-24 ENCOUNTER — LAB (OUTPATIENT)
Dept: LAB | Facility: CLINIC | Age: 78
End: 2023-04-24
Payer: MEDICARE

## 2023-04-24 ENCOUNTER — OFFICE VISIT (OUTPATIENT)
Dept: ORTHOPEDICS | Facility: CLINIC | Age: 78
End: 2023-04-24
Payer: MEDICARE

## 2023-04-24 ENCOUNTER — THERAPY VISIT (OUTPATIENT)
Dept: OCCUPATIONAL THERAPY | Facility: CLINIC | Age: 78
End: 2023-04-24
Payer: MEDICARE

## 2023-04-24 DIAGNOSIS — L03.114 CELLULITIS OF LEFT UPPER EXTREMITY: ICD-10-CM

## 2023-04-24 DIAGNOSIS — R60.0 EDEMA OF HAND: ICD-10-CM

## 2023-04-24 DIAGNOSIS — G56.22 ULNAR NEUROPATHY AT ELBOW, LEFT: ICD-10-CM

## 2023-04-24 DIAGNOSIS — M79.642 PAIN OF LEFT HAND: Primary | ICD-10-CM

## 2023-04-24 DIAGNOSIS — M79.643 PAIN, HAND: ICD-10-CM

## 2023-04-24 LAB
CRP SERPL-MCNC: <3 MG/L
ERYTHROCYTE [SEDIMENTATION RATE] IN BLOOD BY WESTERGREN METHOD: 21 MM/HR (ref 0–20)

## 2023-04-24 PROCEDURE — 73110 X-RAY EXAM OF WRIST: CPT | Mod: LT | Performed by: RADIOLOGY

## 2023-04-24 PROCEDURE — 86140 C-REACTIVE PROTEIN: CPT | Performed by: PATHOLOGY

## 2023-04-24 PROCEDURE — 99213 OFFICE O/P EST LOW 20 MIN: CPT | Performed by: PHYSICIAN ASSISTANT

## 2023-04-24 PROCEDURE — 97110 THERAPEUTIC EXERCISES: CPT | Mod: GO

## 2023-04-24 PROCEDURE — 85652 RBC SED RATE AUTOMATED: CPT | Performed by: PATHOLOGY

## 2023-04-24 PROCEDURE — 73130 X-RAY EXAM OF HAND: CPT | Mod: LT | Performed by: RADIOLOGY

## 2023-04-24 PROCEDURE — 97165 OT EVAL LOW COMPLEX 30 MIN: CPT | Mod: GO

## 2023-04-24 PROCEDURE — 97535 SELF CARE MNGMENT TRAINING: CPT | Mod: GO

## 2023-04-24 PROCEDURE — 36415 COLL VENOUS BLD VENIPUNCTURE: CPT | Performed by: PATHOLOGY

## 2023-04-24 NOTE — PROGRESS NOTES
[facility]    OUTPATIENT Occupational Therapy ORTHOPEDIC EVALUATION  PLAN OF TREATMENT FOR OUTPATIENT REHABILITATION  (COMPLETE FOR INITIAL CLAIMS ONLY)  Patient's Last Name, First Name, M.I.  YOB: 1945  DouglasGoyo  MICHAEL    Provider s Name:  [facility]   Medical Record No.  [id number]   Start of Care Date:  04/24/23   Onset Date:   03/20/23   Treatment Diagnosis:  L hand swelling (2' cellulitis following PM placement) Medical Diagnosis:     Cellulitis of left upper extremity  Edema of hand       Goals:     04/24/23 0500   Goal #1   Goal #1 household chores   Previous Performance Level Independent   Current Functional Task    Current Performance Level high pain   STG Target Perfomance Open a tight or new jar   STG Target Perform Level mild or less pain   Due Date 05/22/23   LTG Target Task/Performance Pain free household chores   Due Date 06/19/23         Therapy Frequency:  1x/week  Predicted Duration of Therapy Intervention:  8 weeks    Shruti Barreto OTR                 I CERTIFY THE NEED FOR THESE SERVICES FURNISHED UNDER        THIS PLAN OF TREATMENT AND WHILE UNDER MY CARE     (Physician attestation of this document indicates review and certification of the therapy plan).                     Certification Date From:  04/24/23   Certification Date To:  06/19/23    Referring Provider:  Dmitriy Beebe    Initial Assessment        See Epic Evaluation SOC Date: 04/24/23

## 2023-04-24 NOTE — PROGRESS NOTES
Date of Service: Apr 24, 2023    Chief Complaint:   Chief Complaint   Patient presents with     RECHECK     Left hand cellulitis       Interval events: Goyo Cole is a 77 year old male who presents today in follow-up for hand cellulitis and swelling.  Patient was last seen on 3/31/2023, at which time a referral was made for hand therapy.  He was currently on antibiotics.  Patient reach out to the clinic on 4/10/2023 due to increased pain after discontinuing antibiotics.  He was placed on a second course of oral Keflex for 14 days.  Patient reports that he has just couple days left of the Keflex.  Pain and swelling has significantly improved.  He does have some urge schedule wrist pain.  He also feels weak in the left upper extremity, which causes him to drop items.  He denies any erythema.  Denies any fevers or chills.    Patient also reports occasional numbness and tingling to the small and ring finger, particularly in positions with flexed elbow such as holding his Patrick.  Denies numbness and tingling to any other fingers.    The past medical history was reviewed updated in the EMR. This includes medications, surgeries, social history, and review of systems.    Physical examination:  Well-developed, well-nourished and in no acute distress.  Alert and oriented to surroundings.  On examination of the left upper extremity, skin is clean and dry.  Minimal edema.  No erythema.  There is no deformity. There is no significant wrist effusion. Sensation is intact in median, radial and ulnar nerve distributions. There is full strength of EPL, interosseous muscles, and thumb opposition. Fingers are warm and well-perfused. Radial pulse is palpable.  Diffuse tenderness palpation over the radiocarpal joint.  Tolerates wrist extension of approximately 50 degrees to wrist extension flexion of approximately 50 degrees, with pain at end flexion.  Negative Tinel's at the cubital tunnel, sensation intact light touch along the  ulnar nerve distribution, negative cubital tunnel compression.    Radiographs: Three views of the left wrist and hand were obtained and reviewed. These demonstrate Moderate degenerative changes of the first thumb CMC and MCP joints.  Scattered degenerative changes to the IP joints. No significant wrist arthritis or erosions.    Assessment: 77 year old male with history of left upper extremity cellulitis, status post 3 total weeks of oral antibiotics.  Now with residual left wrist pain and weakness.  As well as mild cubital tunnel symptoms.    Plan:    -Obtained inflammatory labs today, CRP and sed rate.  CRP now within the normal limits, ESR trending down.  Discussed that I continue of low concern for persistent infection.  Patient has a couple days left of his antibiotics which she will complete.  We discussed that I suspect his pain and soreness is likely's post cellulitis pain in addition to stiffness of the hand and fingers from immobility and swelling.  Recommended referral to hand therapy for range of motion and strengthening.  -For his intermittent cubital tunnel symptoms, recommend avoiding positions with elbow flexion, night extension splinting, hand therapy for nerve gliding.  -Patient can follow-up in an as-needed basis.  Discussed signs and symptoms of worsening infection that prompt return to the clinic.    ANNY ALEXANDRA PA-C  April 24, 2023 3:33 PM   Orthopaedic Surgery

## 2023-04-24 NOTE — LETTER
4/24/2023         RE: Goyo Cole  [address]  [place] [address]        Dear Colleague,    Thank you for referring your patient, Goyo Cole, to the [facility] [place]. Please see a copy of my visit note below.    Date of Service: Apr 24, 2023    Chief Complaint:   Chief Complaint   Patient presents with    RECHECK     Left hand cellulitis       Interval events: Goyo Cole is a 77 year old male who presents today in follow-up for hand cellulitis and swelling.  Patient was last seen on 3/31/2023, at which time a referral was made for hand therapy.  He was currently on antibiotics.  Patient reach out to the clinic on 4/10/2023 due to increased pain after discontinuing antibiotics.  He was placed on a second course of oral Keflex for 14 days.  Patient reports that he has just couple days left of the Keflex.  Pain and swelling has significantly improved.  He does have some urge schedule wrist pain.  He also feels weak in the left upper extremity, which causes him to drop items.  He denies any erythema.  Denies any fevers or chills.    Patient also reports occasional numbness and tingling to the small and ring finger, particularly in positions with flexed elbow such as holding his Patrick.  Denies numbness and tingling to any other fingers.    The past medical history was reviewed updated in the EMR. This includes medications, surgeries, social history, and review of systems.    Physical examination:  Well-developed, well-nourished and in no acute distress.  Alert and oriented to surroundings.  On examination of the left upper extremity, skin is clean and dry.  Minimal edema.  No erythema.  There is no deformity. There is no significant wrist effusion. Sensation is intact in median, radial and ulnar nerve distributions. There is full strength of EPL, interosseous muscles, and thumb opposition. Fingers are warm and well-perfused. Radial pulse is palpable.  Diffuse  tenderness palpation over the radiocarpal joint.  Tolerates wrist extension of approximately 50 degrees to wrist extension flexion of approximately 50 degrees, with pain at end flexion.  Negative Tinel's at the cubital tunnel, sensation intact light touch along the ulnar nerve distribution, negative cubital tunnel compression.    Radiographs: Three views of the left wrist and hand were obtained and reviewed. These demonstrate Moderate degenerative changes of the first thumb CMC and MCP joints.  Scattered degenerative changes to the IP joints. No significant wrist arthritis or erosions.    Assessment: 77 year old male with history of left upper extremity cellulitis, status post 3 total weeks of oral antibiotics.  Now with residual left wrist pain and weakness.  As well as mild cubital tunnel symptoms.    Plan:    -Obtained inflammatory labs today, CRP and sed rate.  CRP now within the normal limits, ESR trending down.  Discussed that I continue of low concern for persistent infection.  Patient has a couple days left of his antibiotics which she will complete.  We discussed that I suspect his pain and soreness is likely's post cellulitis pain in addition to stiffness of the hand and fingers from immobility and swelling.  Recommended referral to hand therapy for range of motion and strengthening.  -For his intermittent cubital tunnel symptoms, recommend avoiding positions with elbow flexion, night extension splinting, hand therapy for nerve gliding.  -Patient can follow-up in an as-needed basis.  Discussed signs and symptoms of worsening infection that prompt return to the clinic.    ANNY ALEXANDRA PA-C  April 24, 2023 3:33 PM   Orthopaedic Surgery

## 2023-04-24 NOTE — NURSING NOTE
"Reason For Visit:   Chief Complaint   Patient presents with     RECHECK     Left hand cellulitis       Primary MD: Magalis Tucker  Ref. MD: KAHLIL    Age: 77 year old    ?  No      There were no vitals taken for this visit.      Pain Assessment  Patient Currently in Pain: No (Left hand pain with use)    Hand Dominance Evaluation  Hand Dominance: Right          QuickDASH Assessment      3/30/2023     7:49 PM   QuickDASH Main   1. Open a tight or new jar Unable   2. Do heavy household chores (e.g., wash walls, floors) Unable   3. Carry a shopping bag or briefcase Unable   4. Wash your back Unable   5. Use a knife to cut food Severe difficulty   6. Recreational activities in which you take some force or impact through your arm, shoulder or hand (e.g., golf, hammering, tennis, etc.) Unable   7. During the past week, to what extent has your arm, shoulder or hand problem interfered with your normal social activities with family, friends, neighbours or groups Extremely   8. During the past week, were you limited in your work or other regular daily activities as a result of your arm, shoulder or hand problem Very limited   9. Arm, shoulder or hand pain Severe   10.Tingling (pins and needles) in your arm,shoulder or hand Moderate   11. During the past week, how much difficulty have you had sleeping because of the pain in your arm, shoulder or hand Mild difficulty   Quickdash Ability Score 81.82          Current Outpatient Medications   Medication Sig Dispense Refill     acetaminophen (TYLENOL) 500 MG tablet Take 1,000 mg by mouth every 6 hours        aspirin 81 MG EC tablet [ASPIRIN 81 MG EC TABLET] Take 81 mg by mouth daily.       atorvastatin (LIPITOR) 40 MG tablet [ATORVASTATIN (LIPITOR) 40 MG TABLET] TAKE 1 TABLET(40 MG) BY MOUTH AT BEDTIME 90 tablet 3     BD EMMETT U/F 32G X 4 MM insulin pen needle [BD ULTRA-FINE EMMETT PEN NEEDLE 32 GAUGE X 5/32\" NDLE] USE AS DIRECTED 300 each 2     calcium citrate-vitamin D " (CITRACAL) 315-200 MG-UNIT TABS per tablet Take 2 tablets by mouth 2 times daily       cephALEXin (KEFLEX) 250 MG capsule Take 1 capsule (250 mg) by mouth 4 times daily for 14 days 56 capsule 0     cholecalciferol, vitamin D3, (CHOLECALCIFEROL) 1,000 unit tablet Take 2,000 Units by mouth daily       Continuous Blood Gluc  (FREESTYLE ESTRADA 14 DAY READER) LAURENCE 1 Application every 14 days 2 each 11     Continuous Blood Gluc Sensor (FREESTYLE ESTRADA 14 DAY SENSOR) MISC 1 each every 14 days 2 each 5     CONTOUR NEXT TEST STRIPS strips [CONTOUR NEXT TEST STRIPS STRIPS] CHECK BLOOD SUGAR BEFORE MEALS AND AT BEDTIME 120 strip 10     insulin glargine U-300 (TOUJEO MAX SOLOSTAR) 300 UNIT/ML (2 units dial) pen Inject 20 Units Subcutaneous At Bedtime 15 mL 3     insulin lispro (HUMALOG KWIKPEN) 100 UNIT/ML (1 unit dial) KWIKPEN Inject 7 Units Subcutaneous 3 times daily (before meals) 30 mL 3     lisinopril (ZESTRIL) 10 MG tablet Take 10 mg by mouth daily       metFORMIN (GLUCOPHAGE) 500 MG tablet Take 1 tablet (500 mg) by mouth 2 times daily (with meals) 180 tablet 0     Metoprolol Succinate (KAPSPARGO) 25 MG CS24 Take 12.5 mg by mouth daily       MICROLET LANCET [MICROLET LANCET] USE TO CHECK BLOOD SUGAR QID as needed 100 each 3     miscellaneous medical supply (BLOOD PRESSURE CUFF) Misc [MISCELLANEOUS MEDICAL SUPPLY (BLOOD PRESSURE CUFF) MISC] Please provide patient with automatic blood pressure cuff.  Diagnosis: hypertension 1 each 0     omeprazole 20 MG tablet Take 20 mg by mouth daily       pioglitazone (ACTOS) 30 MG tablet Take 1 tablet (30 mg) by mouth daily 90 tablet 3       Allergies   Allergen Reactions     Ketamine Other (See Comments)     hallucinations       Ramin Pearl, MAICOL
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
## 2023-05-02 DIAGNOSIS — E78.00 PURE HYPERCHOLESTEROLEMIA: ICD-10-CM

## 2023-05-03 RX ORDER — ATORVASTATIN CALCIUM 40 MG/1
TABLET, FILM COATED ORAL
Qty: 90 TABLET | Refills: 2 | Status: SHIPPED | OUTPATIENT
Start: 2023-05-03 | End: 2023-05-05

## 2023-05-03 NOTE — TELEPHONE ENCOUNTER
"Last Written Prescription Date:  6/1/2022  Last Fill Quantity: 90,  # refills: 3   Last office visit provider:  3/30/2023     Requested Prescriptions   Pending Prescriptions Disp Refills     atorvastatin (LIPITOR) 40 MG tablet [Pharmacy Med Name: ATORVASTATIN 40MG TABLETS] 90 tablet 3     Sig: TAKE 1 TABLET BY MOUTH EVERY NIGHT AT BEDTIME       Statins Protocol Passed - 5/3/2023  3:55 PM        Passed - LDL on file in past 12 months     Recent Labs   Lab Test 11/16/22  1142   LDL 30             Passed - No abnormal creatine kinase in past 12 months     No lab results found.             Passed - Recent (12 mo) or future (30 days) visit within the authorizing provider's specialty     Patient has had an office visit with the authorizing provider or a provider within the authorizing providers department within the previous 12 mos or has a future within next 30 days. See \"Patient Info\" tab in inbasket, or \"Choose Columns\" in Meds & Orders section of the refill encounter.              Passed - Medication is active on med list        Passed - Patient is age 18 or older             Darcy Rawls RN 05/03/23 3:55 PM  "

## 2023-05-04 ENCOUNTER — THERAPY VISIT (OUTPATIENT)
Dept: OCCUPATIONAL THERAPY | Facility: CLINIC | Age: 78
End: 2023-05-04
Payer: MEDICARE

## 2023-05-04 DIAGNOSIS — M79.642 PAIN OF LEFT HAND: ICD-10-CM

## 2023-05-04 DIAGNOSIS — R60.0 EDEMA OF HAND: ICD-10-CM

## 2023-05-04 DIAGNOSIS — L03.114 CELLULITIS OF LEFT UPPER EXTREMITY: Primary | ICD-10-CM

## 2023-05-04 PROCEDURE — 97112 NEUROMUSCULAR REEDUCATION: CPT | Mod: GO

## 2023-05-04 PROCEDURE — 97110 THERAPEUTIC EXERCISES: CPT | Mod: GO

## 2023-05-04 NOTE — PROGRESS NOTES
Medication Therapy Management (MTM) Encounter    ASSESSMENT:                            Medication Adherence/Access: No concerns. Will send Atorvastatin and Actos to [facility] so patient can have all meds at one pharmacy.       Type 2 Diabetes: Last A1C above goal <8%. CGM averages now at goal <180. In target range >70%. One episode of low blood glucose. Will hold off on med adjustments. If more frequent, consider reducing Novolog dose.     CAD: Last LDL at goal <70. BP at goal <130/80. Pulse at goal . Appropriately using high intensity statin and aspirin. No longer dizzy. Follow-up with cardiology as scheduled.     Low Vitamin D: WNL with target goal of 45-60. Is using a supplement.        PLAN:                            1. Continue your current medications. No changes.     Follow-up: No follow-ups on file.   Scheduled as needed.       SUBJECTIVE/OBJECTIVE:                          Goyo Cole is a 76 year old male coming in for a follow-up visit. He was referred to me from Magalis Tucker PA-C. Patient was accompanied by wife.. Today's visit is a follow-up MTM visit from 08/09/2022.     Reason for visit: Referred for diabetes management.      Allergies/ADRs: Reviewed in chart  Past Medical History: Reviewed in chart  Tobacco: He reports that he has quit smoking. He quit smokeless tobacco use about 7 years ago.  His smokeless tobacco use included chew. Still using chewing tobacco - working on reducing.   Alcohol: Occasional.       Medication Adherence/Access: Patient's wife helps to set up with medications.     Sets all the vitamins in an old pill vial for the day. Adds in daily medicines.     Still has a couple of meds that are getting filled at [facility].   Atorvastatin and Pioglitazone         Type 2 Diabetes: Prescribed Pioglitazone 30 mg daily, Toujeo 20 units daily and Novolog 7 units three times daily. At last MTM visit, restarted Metformin at 500 mg twice daily.          Blood sugar monitoring:  Continuous Glucose Monitor. Continues to feel the CGM isn't very accurate.                Symptoms of low blood sugar? None  Symptoms of high blood sugar? none    Diet/Exercise: Has noticed increased appetite and also increased weight since the last visit. Now around 260# declined recheck today. Has been focusing on portion control with sweets. Having 3 cookies vs 5-6 at a time.       Hemoglobin A1C   Date Value Ref Range Status   11/16/2022 6.8 (H) 0.0 - 5.6 % Final     Comment:     Normal <5.7%   Prediabetes 5.7-6.4%    Diabetes 6.5% or higher     Note: Adopted from ADA consensus guidelines.   07/12/2022 8.5 (H) 0.0 - 5.6 % Final     Comment:     Normal <5.7%   Prediabetes 5.7-6.4%    Diabetes 6.5% or higher     Note: Adopted from ADA consensus guidelines.   01/04/2022 7.4 (H) 0.0 - 5.6 % Final     Comment:     Normal <5.7%   Prediabetes 5.7-6.4%    Diabetes 6.5% or higher     Note: Adopted from ADA consensus guidelines.      Microalbumin Urine mg/dL   Date Value Ref Range Status   01/21/2021 1.79 0.00 - 1.99 mg/dL Final      Creatinine Urine mg/dL   Date Value Ref Range Status   11/16/2022 94.8 mg/dL Final     Comment:     The reference ranges have not been established in urine creatinine. The results should be integrated into the clinical context for interpretation.     LDL Cholesterol Calculated   Date Value Ref Range Status   11/16/2022 30 <=100 mg/dL Final     LDL Cholesterol Direct   Date Value Ref Range Status   02/13/2020 42 <=129 mg/dl Final     Creatinine   Date Value Ref Range Status   03/30/2023 1.63 (H) 0.67 - 1.17 mg/dL Final     GFR Estimate   Date Value Ref Range Status   03/30/2023 43 (L) >60 mL/min/1.73m2 Final     Comment:     eGFR calculated using 2021 CKD-EPI equation.   07/09/2021 47 (L) >60 mL/min/1.73m2 Final     GFR Estimate If Black   Date Value Ref Range Status   07/09/2021 57 (L) >60 mL/min/1.73m2 Final         CAD: Prescribed Metoprolol Succinate 25 mg 0.5 tab daily, Lisinopril 10 mg  daily, Aspirin 81 mg daily, Atorvastatin 40 mg daily.     Brilinta discontinued by cardiology on 3/1. Was switched from metoprolol to tartrate to metoprolol succinate.  Seen in the ED on 3/16 with bradycardia, and dose was reduced to 12.5 mg daily.  Been hospitalized at [facility] from 3/20 to 3/22.  Had pacemaker placed on 3/21.  Beta-blocker was resumed at discharge.    Has cardiology follow-up next week.      Dizziness has improved.  Continues to have some fatigue. Thinks it's because he's out of shape.     Recent Labs   Lab Test 04/12/21  0740 02/13/20  1133 06/01/18  1613 02/06/18  0818   CHOL 95  --   --  105   HDL 33*  --   --  35*   LDL 46 42   < > 49   TRIG 82  --   --  103    < > = values in this interval not displayed.       BP Readings from Last 3 Encounters:   05/05/23 124/71   04/07/23 127/74   03/30/23 134/70      Pulse Readings from Last 3 Encounters:   05/05/23 60   04/07/23 73   03/30/23 72         Low Vitamin D: Prescribed Vitamin D3 2000 units daily .     Vitamin D Deficiency Screening Results:  Lab Results   Component Value Date    VITDT 56 03/08/2022         Today's Vitals: /71   Pulse 60   ----------------    I spent 20 minutes with this patient today. All changes were made via collaborative practice agreement with Magalis Tucker PA-C. A copy of the visit note was provided to the patient's provider(s).    The patient was given a summary of these recommendations.     Jake Ortega, JuanpabloD  Medication Therapy Management (MTM) Pharmacist  [facility]       Medication Therapy Recommendations  No medication therapy recommendations to display

## 2023-05-04 NOTE — PROGRESS NOTES
Edema (Circumference measured in cm)   4/24/2023 4/24/2023 5/4/23    R L L   DWC 19.5 20.5 20.4    Across MP joints 13.8 14.4 14.0    IF prox phalanx none none none   IF PIP  none none none     Sensation   Reports occasional numbness in ulnar nerve distribution while in certain positions, symptoms resolve w/ release of position     ROM  Pain Report: - none  + mild    ++ moderate    +++ severe   Wrist 4/24/2023 4/24/2023   AROM (PROM) R L   Extension 60 50   Flexion 70 60   RD 15 15   UD 35 26   Supination full full   Pronation full Full      ROM  Index Finger 4/24/2023 4/24/2023   AROM (PROM) R L   MCP /75 0/75   PIP /91 0/85   DIP /51 0/45   CHAO       Able to touch DPC for all digits bilaterally    ROM  Thumb 4/24/2023 4/24/2023   AROM  (PROM) R L   MP /60 /45   IP /60 /60   Kapandji Opposition Scale (0-10/10) 5 4     Strength   (Measured in pounds)  Pain Report: - none  + mild    ++ moderate    +++ severe    4/24/2023 4/24/2023 5/4/23    Trials R L L   1  2  3 98 20 41   Average        Lat Pinch 4/24/2023 4/24/2023   Trials R L   1  2  3 20 15   Average       3 Pt Pinch 4/24/2023 4/24/2023   Trials R L   1  2  3 18 4   Average

## 2023-05-05 ENCOUNTER — OFFICE VISIT (OUTPATIENT)
Dept: PHARMACY | Facility: CLINIC | Age: 78
End: 2023-05-05
Payer: COMMERCIAL

## 2023-05-05 VITALS — SYSTOLIC BLOOD PRESSURE: 124 MMHG | DIASTOLIC BLOOD PRESSURE: 71 MMHG | HEART RATE: 60 BPM

## 2023-05-05 DIAGNOSIS — E11.22 TYPE 2 DIABETES MELLITUS WITH CHRONIC KIDNEY DISEASE, WITH LONG-TERM CURRENT USE OF INSULIN, UNSPECIFIED CKD STAGE (H): Primary | ICD-10-CM

## 2023-05-05 DIAGNOSIS — I25.810 CORONARY ARTERY DISEASE INVOLVING AUTOLOGOUS ARTERY CORONARY BYPASS GRAFT WITHOUT ANGINA PECTORIS: ICD-10-CM

## 2023-05-05 DIAGNOSIS — Z79.4 TYPE 2 DIABETES MELLITUS WITH CHRONIC KIDNEY DISEASE, WITH LONG-TERM CURRENT USE OF INSULIN, UNSPECIFIED CKD STAGE (H): Primary | ICD-10-CM

## 2023-05-05 DIAGNOSIS — E78.00 PURE HYPERCHOLESTEROLEMIA: ICD-10-CM

## 2023-05-05 DIAGNOSIS — E55.9 VITAMIN D DEFICIENCY: ICD-10-CM

## 2023-05-05 PROCEDURE — 99207 PR NO CHARGE LOS: CPT | Performed by: PHARMACIST

## 2023-05-05 RX ORDER — PIOGLITAZONEHYDROCHLORIDE 30 MG/1
30 TABLET ORAL DAILY
Qty: 90 TABLET | Refills: 3 | Status: SHIPPED | OUTPATIENT
Start: 2023-05-05 | End: 2024-07-02

## 2023-05-05 RX ORDER — ATORVASTATIN CALCIUM 40 MG/1
TABLET, FILM COATED ORAL
Qty: 90 TABLET | Refills: 2 | Status: SHIPPED | OUTPATIENT
Start: 2023-05-05

## 2023-05-05 NOTE — PATIENT INSTRUCTIONS
"Recommendations from today's MTM visit:                                                         1. Continue your current medications. No changes.     Follow-up: No follow-ups on file. Schedule as needed     It was great speaking with you today.  I value your experience and would be very thankful for your time in providing feedback in our clinic survey. In the next few days, you may receive an email or text message from [facility] with a link to a survey related to your  clinical pharmacist.\"     To schedule another MTM appointment, please call the clinic directly or you may call the MTM scheduling line at [phone] or toll-free at [phone].     My Clinical Pharmacist's contact information:                                                      Please feel free to contact me with any questions or concerns you have.      Jake Ortega, PharmD  Medication Therapy Management (MTM) Pharmacist  [facility]      "

## 2023-05-08 ENCOUNTER — THERAPY VISIT (OUTPATIENT)
Dept: OCCUPATIONAL THERAPY | Facility: CLINIC | Age: 78
End: 2023-05-08
Payer: MEDICARE

## 2023-05-08 DIAGNOSIS — R60.0 EDEMA OF HAND: Primary | ICD-10-CM

## 2023-05-08 DIAGNOSIS — L03.114 CELLULITIS OF LEFT UPPER EXTREMITY: ICD-10-CM

## 2023-05-08 PROCEDURE — 97535 SELF CARE MNGMENT TRAINING: CPT | Mod: GO | Performed by: OCCUPATIONAL THERAPIST

## 2023-05-08 PROCEDURE — 97530 THERAPEUTIC ACTIVITIES: CPT | Mod: GO | Performed by: OCCUPATIONAL THERAPIST

## 2023-05-08 PROCEDURE — 97110 THERAPEUTIC EXERCISES: CPT | Mod: GO | Performed by: OCCUPATIONAL THERAPIST

## 2023-05-08 NOTE — PROGRESS NOTES
"SOAP note objective information for 5/8/2023:    Pain Level (Scale 0-10)   4/24/2023 5/8/2023   At Rest 0    With Use 8-9 \"12\"     ROM  Wrist 4/24/2023 4/24/2023 5/8/2023   AROM (PROM) R L L   Extension 60 50 40   Flexion 70 60 25   RD 15 15    UD 35 26      Index Finger 4/24/2023 4/24/2023 5/8/2023   AROM (PROM) R L L   MCP /75 0/75 /60   PIP /91 0/85 /90   DIP /51 0/45 /30     Strength   (Measured in pounds)  Pain Report: - none  + mild    ++ moderate    +++ severe    4/24/2023 4/24/2023 5/8/2023   Trials R L L   1 98 20 NT     Home Exercise Program:  Warmth for stiffness  AROM fingers with 3 fist tendon gliding  AA/PROM composite finger flexion  AROM wrist E/F and R/U  AROM elbow P/S and E/F  Towel gathering and spreading for functional finger ROM  Gentle  strengthening with pink foam wedge  Isotoner glove sleeping    Next Visit:  See in 1-2 weeks  Assess response to AA/PROM for finger flexion, AROM for wrist and elbow and towel gathering/spreading  Add PROM for wrist E/F as tolerated  Progress to wrist isotonics as tolerated     Please refer to the daily flowsheet for treatment today, total treatment time and time spent performing 1:1 timed codes.   "

## 2023-05-10 ENCOUNTER — MYC MEDICAL ADVICE (OUTPATIENT)
Dept: ORTHOPEDICS | Facility: CLINIC | Age: 78
End: 2023-05-10
Payer: MEDICARE

## 2023-05-12 NOTE — TELEPHONE ENCOUNTER
Called and spoke to patient and his wife. Patient states that after hand therapy on Monday he started having increased pain and swelling in his hand. His wife sent the message on Wednesday when he reports the pain was at its worst. Since then, the pain has gotten better but the swelling has yet to resolve. He reports no other signs or symptoms of infection. Informed him and his wife of concerning signs and symptoms of infection and to go to urgent care or the ED immediately if any of these present. Offered patient a follow-up appointment today with Elva but he is unable to make it down here. Scheduled follow-up on 5/22/23 and encouraged him to call back or send a mychart if he has any symptoms of infection or hand pain/swelling does not continue to improve. All questions were answered.    MAICOL Forman

## 2023-05-16 ENCOUNTER — THERAPY VISIT (OUTPATIENT)
Dept: OCCUPATIONAL THERAPY | Facility: CLINIC | Age: 78
End: 2023-05-16
Payer: MEDICARE

## 2023-05-16 DIAGNOSIS — L03.114 CELLULITIS OF LEFT UPPER EXTREMITY: Primary | ICD-10-CM

## 2023-05-16 DIAGNOSIS — R60.0 EDEMA OF HAND: ICD-10-CM

## 2023-05-16 PROCEDURE — 97110 THERAPEUTIC EXERCISES: CPT | Mod: GO | Performed by: OCCUPATIONAL THERAPIST

## 2023-05-16 PROCEDURE — 97140 MANUAL THERAPY 1/> REGIONS: CPT | Mod: GO | Performed by: OCCUPATIONAL THERAPIST

## 2023-05-16 NOTE — PROGRESS NOTES
"SOAP note objective information for 5/16/2023:    Pain Level (Scale 0-10)   4/24/2023 5/8/2023 5/16/2023   At Rest 0     With Use 8-9 \"12\" 5 today  \"25\" last week     ROM  Wrist 4/24/2023 4/24/2023 5/8/2023 5/16/2023   AROM (PROM) R L L L   Extension 60 50 40 45   Flexion 70 60 25 50   RD 15 15     UD 35 26       Finger flexion at start of session 7 cm, 3 cm with AAROM    Strength   (Measured in pounds)  Pain Report: - none  + mild    ++ moderate    +++ severe    4/24/2023 4/24/2023   Trials R L   1 98 20     Home Exercise Program:  Warmth for stiffness  Ice for pain or swelling  Self MEM massage left UE  Avoid pain with HEP  AROM fingers with 3 fist tendon gliding  AA/PROM composite finger flexion  AROM wrist E/F and R/U  AROM elbow P/S and E/F  AROM shoulder flexion/reach overhead to tolerance   Towel gathering and spreading for functional finger ROM  Isotoner glove sleeping    Next Visit:  See in 1 week  Assess response to self MEM  Resume  strengthening as tolerated  Add PROM for wrist E/F as tolerated  Progress to wrist isotonics as tolerated   Await MD f/u 5/22/2023    Please refer to the daily flowsheet for treatment today, total treatment time and time spent performing 1:1 timed codes.   "

## 2023-05-22 ENCOUNTER — LAB (OUTPATIENT)
Dept: LAB | Facility: CLINIC | Age: 78
End: 2023-05-22
Payer: MEDICARE

## 2023-05-22 ENCOUNTER — OFFICE VISIT (OUTPATIENT)
Dept: ORTHOPEDICS | Facility: CLINIC | Age: 78
End: 2023-05-22
Payer: MEDICARE

## 2023-05-22 DIAGNOSIS — M79.642 PAIN OF LEFT HAND: ICD-10-CM

## 2023-05-22 DIAGNOSIS — R60.0 EDEMA OF HAND: Primary | ICD-10-CM

## 2023-05-22 DIAGNOSIS — R60.0 EDEMA OF HAND: ICD-10-CM

## 2023-05-22 LAB
CRP SERPL-MCNC: <3 MG/L
ERYTHROCYTE [SEDIMENTATION RATE] IN BLOOD BY WESTERGREN METHOD: 19 MM/HR (ref 0–20)

## 2023-05-22 PROCEDURE — 86140 C-REACTIVE PROTEIN: CPT | Performed by: PATHOLOGY

## 2023-05-22 PROCEDURE — 99214 OFFICE O/P EST MOD 30 MIN: CPT | Performed by: PHYSICIAN ASSISTANT

## 2023-05-22 PROCEDURE — 85652 RBC SED RATE AUTOMATED: CPT | Performed by: PATHOLOGY

## 2023-05-22 PROCEDURE — 36415 COLL VENOUS BLD VENIPUNCTURE: CPT | Performed by: PATHOLOGY

## 2023-05-22 NOTE — NURSING NOTE
Reason For Visit:   Chief Complaint   Patient presents with     RECHECK     Follow up left hand cellulitis.       Primary MD: Magalis Tucker  Ref. MD: Nathalia    Age: 77 year old    ?  No      There were no vitals taken for this visit.      Pain Assessment  Patient Currently in Pain: Yes  0-10 Pain Scale: 10  Primary Pain Location: Hand (left)  Pain Descriptors: Intermittent, Sharp, Shooting  Alleviating Factors: NSAIDS    Hand Dominance Evaluation  Hand Dominance: Right          QuickDASH Assessment      3/30/2023     7:49 PM   QuickDASH Main   1. Open a tight or new jar Unable   2. Do heavy household chores (e.g., wash walls, floors) Unable   3. Carry a shopping bag or briefcase Unable   4. Wash your back Unable   5. Use a knife to cut food Severe difficulty   6. Recreational activities in which you take some force or impact through your arm, shoulder or hand (e.g., golf, hammering, tennis, etc.) Unable   7. During the past week, to what extent has your arm, shoulder or hand problem interfered with your normal social activities with family, friends, neighbours or groups Extremely   8. During the past week, were you limited in your work or other regular daily activities as a result of your arm, shoulder or hand problem Very limited   9. Arm, shoulder or hand pain Severe   10.Tingling (pins and needles) in your arm,shoulder or hand Moderate   11. During the past week, how much difficulty have you had sleeping because of the pain in your arm, shoulder or hand Mild difficulty   Quickdash Ability Score 81.82          Current Outpatient Medications   Medication Sig Dispense Refill     acetaminophen (TYLENOL) 500 MG tablet Take 1,000 mg by mouth every 6 hours        aspirin 81 MG EC tablet [ASPIRIN 81 MG EC TABLET] Take 81 mg by mouth daily.       atorvastatin (LIPITOR) 40 MG tablet TAKE 1 TABLET BY MOUTH EVERY NIGHT AT BEDTIME 90 tablet 2     BD EMMETT U/F 32G X 4 MM insulin pen needle [BD ULTRA-FINE EMMETT  "PEN NEEDLE 32 GAUGE X 5/32\" NDLE] USE AS DIRECTED 300 each 2     calcium citrate-vitamin D (CITRACAL) 315-200 MG-UNIT TABS per tablet Take 2 tablets by mouth 2 times daily       cholecalciferol, vitamin D3, (CHOLECALCIFEROL) 1,000 unit tablet Take 2,000 Units by mouth daily       Continuous Blood Gluc  (FREESTYLE ESTRADA 14 DAY READER) LAURENCE 1 Application every 14 days 2 each 11     Continuous Blood Gluc Sensor (FREESTYLE ESTRADA 14 DAY SENSOR) MISC 1 each every 14 days 2 each 5     CONTOUR NEXT TEST STRIPS strips [CONTOUR NEXT TEST STRIPS STRIPS] CHECK BLOOD SUGAR BEFORE MEALS AND AT BEDTIME 120 strip 10     insulin glargine U-300 (TOUJEO MAX SOLOSTAR) 300 UNIT/ML (2 units dial) pen Inject 20 Units Subcutaneous At Bedtime 15 mL 3     insulin lispro (HUMALOG KWIKPEN) 100 UNIT/ML (1 unit dial) KWIKPEN Inject 7 Units Subcutaneous 3 times daily (before meals) 30 mL 3     lisinopril (ZESTRIL) 10 MG tablet Take 10 mg by mouth daily       metFORMIN (GLUCOPHAGE) 500 MG tablet Take 1 tablet (500 mg) by mouth 2 times daily (with meals) 180 tablet 0     Metoprolol Succinate (KAPSPARGO) 25 MG CS24 Take 12.5 mg by mouth daily       MICROLET LANCET [MICROLET LANCET] USE TO CHECK BLOOD SUGAR QID as needed 100 each 3     miscellaneous medical supply (BLOOD PRESSURE CUFF) Misc [MISCELLANEOUS MEDICAL SUPPLY (BLOOD PRESSURE CUFF) MISC] Please provide patient with automatic blood pressure cuff.  Diagnosis: hypertension 1 each 0     omeprazole 20 MG tablet Take 20 mg by mouth daily       pioglitazone (ACTOS) 30 MG tablet Take 1 tablet (30 mg) by mouth daily 90 tablet 3       Allergies   Allergen Reactions     Ketamine Other (See Comments)     hallucinations       JERRY MILAN, ATC    "

## 2023-05-22 NOTE — LETTER
5/22/2023         RE: Goyo Cole  [address]  [place] [address]        Dear Colleague,    Thank you for referring your patient, Goyo Cole, to the [facility] [place]. Please see a copy of my visit note below.    Date of Service: May 22, 2023    Chief Complaint:   Chief Complaint   Patient presents with    RECHECK     Follow up left hand cellulitis.       Interval events: Goyo Cole is a 77 year old male who presents today in follow-up for hand cellulitis and swelling.  Patient was seen on 3/31/2023, at which time a referral was made for hand therapy.  He was currently on antibiotics.  Patient reach out to the clinic on 4/10/2023 due to increased pain after discontinuing antibiotics.  He was placed on a second course of oral Keflex for 14 days.  Patient was seen again on 4/24/2023 at which time he continued to have some ulnar-sided wrist pain.     Patient reports that he had been doing quite well, however reach back out to the clinic about a week ago with increased left wrist pain and swelling.  This subsequently subsided a few days later.  He is wearing a compression sleeve, mostly at night.  He states that this does help with the swelling.  Swelling today is much better than it has been.  Patient had has continued with hand therapy, but reports intermittent wrist pain, pointing to the radiocarpal joint.  She also notes some pain to the dorsal aspects of the PIPs in the index, middle, ring, and small fingers.  He feels his hands are more stiff than they were prior to his infection. He denies any erythema.  Denies any fevers or chills.    Patient also reports occasional numbness and tingling to the small and ring finger, particularly in positions with flexed elbow such as holding his Patrick.  Denies numbness and tingling to any other fingers.    The past medical history was reviewed updated in the EMR. This includes medications, surgeries, social history, and  review of systems.    Physical examination:  Well-developed, well-nourished and in no acute distress.  Alert and oriented to surroundings.  On examination of the left upper extremity, skin is clean and dry.  Minimal edema.  No erythema.  There is no deformity. There is no significant wrist effusion. Sensation is intact in median, radial and ulnar nerve distributions. There is full strength of EPL, interosseous muscles, and thumb opposition. Fingers are warm and well-perfused. Radial pulse is palpable.  Diffuse tenderness palpation over the radiocarpal joint, improved from prior.  Pain in the fovea, pain with ulnar impaction.  Tolerates wrist extension of approximately 50 degrees to wrist extension flexion of approximately 50 degrees, with pain at end flexion over the dorsal aspect the wrist.     Assessment: 77 year old male with history of left upper extremity cellulitis, status post 3 total weeks of oral antibiotics.  Continued waxing and waning residual left wrist pain. No evidence of infection at this time.    Plan:    -Obtained inflammatory labs today, CRP and sed rate within normal limits.  -We discussed continued hand therapy, range of motion, strengthening exercises.  -We discussed for his persistent radiocarpal wrist pain we could consider advanced imaging such as MRI, however patient recently had a pacemaker placed.  We also discussed trial of a corticosteroid injection versus oral steroid burst.  However patient has a history of diabetes.  After considerable discussion the patient and his wife patient elected to continue with conservative management with over-the-counter anti-inflammatories, topical NSAIDs, hand therapy, compression sleeves.  -Patient can follow-up in an as-needed basis.     Total time spent on date of encounter: 34 minutes.     ANNY ALEXANDRA PA-C  Orthopaedic Surgery

## 2023-05-25 ENCOUNTER — THERAPY VISIT (OUTPATIENT)
Dept: OCCUPATIONAL THERAPY | Facility: CLINIC | Age: 78
End: 2023-05-25
Payer: MEDICARE

## 2023-05-25 DIAGNOSIS — R60.0 EDEMA OF HAND: ICD-10-CM

## 2023-05-25 DIAGNOSIS — L03.114 CELLULITIS OF LEFT UPPER EXTREMITY: Primary | ICD-10-CM

## 2023-05-25 PROCEDURE — 97110 THERAPEUTIC EXERCISES: CPT | Mod: GO | Performed by: OCCUPATIONAL THERAPIST

## 2023-05-25 PROCEDURE — 97140 MANUAL THERAPY 1/> REGIONS: CPT | Mod: GO | Performed by: OCCUPATIONAL THERAPIST

## 2023-05-25 NOTE — PROGRESS NOTES
"Objective Information for 5/25/2023:    Pain Level (Scale 0-10)   4/24/2023 5/8/2023 5/16/2023 5/25/2023   At Rest 0      With Use 8-9 \"12\" 5 today  \"25\" last week 2   Worst    9     ROM  Wrist 4/24/2023 4/24/2023 5/8/2023 5/16/2023 5/25/2023   AROM (PROM) R L L L L   Extension 60 50 40 45 50   Flexion 70 60 25 50 55   RD 15 15      UD 35 26        Finger flexion 2-3 cm to DPC    Strength   (Measured in pounds)  Pain Report: - none  + mild    ++ moderate    +++ severe    4/24/2023 4/24/2023 5/25/2023   Trials R L L   1 98 20 32+     Home Exercise Program:  Warmth for stiffness  Ice for pain or swelling  Self MEM massage left UE  Avoid pain with HEP  AROM fingers with 3 fist tendon gliding  AA/PROM composite finger flexion  AROM wrist E/F and R/U  AROM elbow P/S and E/F  AROM shoulder flexion/reach overhead to tolerance   Towel gathering and spreading for functional finger ROM  Resume gentle gripping with yellow sponge, avoid pain or increased swelling  Isotoner glove sleeping    Next Visit:  See in 1 week  Assess response to resuming  strengthening  Add PROM for wrist E/F as indicated  Progress to wrist isotonics or isometrics as tolerated   "

## 2023-05-26 NOTE — PROGRESS NOTES
Date of Service: May 22, 2023    Chief Complaint:   Chief Complaint   Patient presents with     RECHECK     Follow up left hand cellulitis.       Interval events: Goyo Cole is a 77 year old male who presents today in follow-up for hand cellulitis and swelling.  Patient was seen on 3/31/2023, at which time a referral was made for hand therapy.  He was currently on antibiotics.  Patient reach out to the clinic on 4/10/2023 due to increased pain after discontinuing antibiotics.  He was placed on a second course of oral Keflex for 14 days.  Patient was seen again on 4/24/2023 at which time he continued to have some ulnar-sided wrist pain.     Patient reports that he had been doing quite well, however reach back out to the clinic about a week ago with increased left wrist pain and swelling.  This subsequently subsided a few days later.  He is wearing a compression sleeve, mostly at night.  He states that this does help with the swelling.  Swelling today is much better than it has been.  Patient had has continued with hand therapy, but reports intermittent wrist pain, pointing to the radiocarpal joint.  She also notes some pain to the dorsal aspects of the PIPs in the index, middle, ring, and small fingers.  He feels his hands are more stiff than they were prior to his infection. He denies any erythema.  Denies any fevers or chills.    Patient also reports occasional numbness and tingling to the small and ring finger, particularly in positions with flexed elbow such as holding his Patrick.  Denies numbness and tingling to any other fingers.    The past medical history was reviewed updated in the EMR. This includes medications, surgeries, social history, and review of systems.    Physical examination:  Well-developed, well-nourished and in no acute distress.  Alert and oriented to surroundings.  On examination of the left upper extremity, skin is clean and dry.  Minimal edema.  No erythema.  There is no deformity. There  is no significant wrist effusion. Sensation is intact in median, radial and ulnar nerve distributions. There is full strength of EPL, interosseous muscles, and thumb opposition. Fingers are warm and well-perfused. Radial pulse is palpable.  Diffuse tenderness palpation over the radiocarpal joint, improved from prior.  Pain in the fovea, pain with ulnar impaction.  Tolerates wrist extension of approximately 50 degrees to wrist extension flexion of approximately 50 degrees, with pain at end flexion over the dorsal aspect the wrist.     Assessment: 77 year old male with history of left upper extremity cellulitis, status post 3 total weeks of oral antibiotics.  Continued waxing and waning residual left wrist pain. No evidence of infection at this time.    Plan:    -Obtained inflammatory labs today, CRP and sed rate within normal limits.  -We discussed continued hand therapy, range of motion, strengthening exercises.  -We discussed for his persistent radiocarpal wrist pain we could consider advanced imaging such as MRI, however patient recently had a pacemaker placed.  We also discussed trial of a corticosteroid injection versus oral steroid burst.  However patient has a history of diabetes.  After considerable discussion the patient and his wife patient elected to continue with conservative management with over-the-counter anti-inflammatories, topical NSAIDs, hand therapy, compression sleeves.  -Patient can follow-up in an as-needed basis.     Total time spent on date of encounter: 34 minutes.     ANNY ALEXANDRA PA-C  Orthopaedic Surgery

## 2023-06-04 ENCOUNTER — HEALTH MAINTENANCE LETTER (OUTPATIENT)
Age: 78
End: 2023-06-04

## 2023-06-07 ENCOUNTER — THERAPY VISIT (OUTPATIENT)
Dept: OCCUPATIONAL THERAPY | Facility: CLINIC | Age: 78
End: 2023-06-07
Payer: MEDICARE

## 2023-06-07 DIAGNOSIS — L03.114 CELLULITIS OF LEFT UPPER EXTREMITY: Primary | ICD-10-CM

## 2023-06-07 DIAGNOSIS — R60.0 EDEMA OF HAND: ICD-10-CM

## 2023-06-07 PROCEDURE — 97110 THERAPEUTIC EXERCISES: CPT | Mod: GO | Performed by: OCCUPATIONAL THERAPIST

## 2023-06-07 NOTE — PROGRESS NOTES
"Objective Information for 6/7/2023:    Pain Level (Scale 0-10)   4/24/2023 5/8/2023 5/16/2023 5/25/2023 6/7/2023   At Rest 0       With Use 8-9 \"12\" 5 today  \"25\" last week 2    Worst    9 6-8     ROM  Wrist 4/24/2023 4/24/2023 5/8/2023 5/16/2023 5/25/2023 6/7/2023   AROM (PROM) R L L L L L   Extension 60 50 40 45 50 50   Flexion 70 60 25 50 55 55   RD 15 15       UD 35 26         Finger flexion 1-2 cm (previously 2-3 cm) to DPC    Strength   (Measured in pounds)  Pain Report: - none  + mild    ++ moderate    +++ severe    4/24/2023 4/24/2023 5/25/2023 6/7/2023   Trials R L L L   1 98 20 32+ 50-     Home Exercise Program:  Warmth for stiffness  Ice for pain or swelling  Self MEM massage left UE  Avoid pain with HEP  AROM fingers with 3 fist tendon gliding  AA/PROM composite finger flexion  AROM wrist E/F and R/U  Gentle PROM wrist E/F  AROM elbow P/S and E/F  AROM shoulder flexion/reach overhead to tolerance   Towel gathering and spreading for functional finger ROM  Gripping with yellow sponge  Wrist E/F isotonics   Isotoner glove sleeping    Next Visit:  See in 1 week  Assess response to PROM and isotonics for wrist E/F  Progress to pink sponge for gripping  Progress Report and UEFI to determine POC, anticipate discharge to [facility] is doing well  "

## 2023-06-09 ENCOUNTER — OFFICE VISIT (OUTPATIENT)
Dept: FAMILY MEDICINE | Facility: CLINIC | Age: 78
End: 2023-06-09
Payer: MEDICARE

## 2023-06-09 VITALS
DIASTOLIC BLOOD PRESSURE: 64 MMHG | WEIGHT: 287 LBS | OXYGEN SATURATION: 93 % | RESPIRATION RATE: 20 BRPM | TEMPERATURE: 98.1 F | HEART RATE: 82 BPM | SYSTOLIC BLOOD PRESSURE: 122 MMHG | BODY MASS INDEX: 36.85 KG/M2

## 2023-06-09 DIAGNOSIS — D64.9 ANEMIA, UNSPECIFIED TYPE: ICD-10-CM

## 2023-06-09 DIAGNOSIS — E11.22 TYPE 2 DIABETES MELLITUS WITH CHRONIC KIDNEY DISEASE, WITH LONG-TERM CURRENT USE OF INSULIN, UNSPECIFIED CKD STAGE (H): Primary | ICD-10-CM

## 2023-06-09 DIAGNOSIS — D69.6 THROMBOCYTOPENIA (H): ICD-10-CM

## 2023-06-09 DIAGNOSIS — Z79.4 TYPE 2 DIABETES MELLITUS WITH CHRONIC KIDNEY DISEASE, WITH LONG-TERM CURRENT USE OF INSULIN, UNSPECIFIED CKD STAGE (H): Primary | ICD-10-CM

## 2023-06-09 DIAGNOSIS — Z23 NEED FOR SHINGLES VACCINE: ICD-10-CM

## 2023-06-09 DIAGNOSIS — I10 ESSENTIAL HYPERTENSION: ICD-10-CM

## 2023-06-09 DIAGNOSIS — N28.9 DISORDER OF KIDNEY AND URETER: ICD-10-CM

## 2023-06-09 LAB
CREAT SERPL-MCNC: 1.71 MG/DL (ref 0.67–1.17)
ERYTHROCYTE [DISTWIDTH] IN BLOOD BY AUTOMATED COUNT: 13.3 % (ref 10–15)
GFR SERPL CREATININE-BSD FRML MDRD: 41 ML/MIN/1.73M2
HBA1C MFR BLD: 7.2 % (ref 0–5.6)
HCT VFR BLD AUTO: 37.1 % (ref 40–53)
HGB BLD-MCNC: 12 G/DL (ref 13.3–17.7)
MCH RBC QN AUTO: 34.9 PG (ref 26.5–33)
MCHC RBC AUTO-ENTMCNC: 32.3 G/DL (ref 31.5–36.5)
MCV RBC AUTO: 108 FL (ref 78–100)
PLATELET # BLD AUTO: 104 10E3/UL (ref 150–450)
RBC # BLD AUTO: 3.44 10E6/UL (ref 4.4–5.9)
WBC # BLD AUTO: 5.8 10E3/UL (ref 4–11)

## 2023-06-09 PROCEDURE — 99214 OFFICE O/P EST MOD 30 MIN: CPT | Performed by: PHYSICIAN ASSISTANT

## 2023-06-09 PROCEDURE — 36415 COLL VENOUS BLD VENIPUNCTURE: CPT | Performed by: PHYSICIAN ASSISTANT

## 2023-06-09 PROCEDURE — 99207 PR FOOT EXAM NO CHARGE: CPT | Performed by: PHYSICIAN ASSISTANT

## 2023-06-09 PROCEDURE — 82565 ASSAY OF CREATININE: CPT | Performed by: PHYSICIAN ASSISTANT

## 2023-06-09 PROCEDURE — 85027 COMPLETE CBC AUTOMATED: CPT | Performed by: PHYSICIAN ASSISTANT

## 2023-06-09 PROCEDURE — 83036 HEMOGLOBIN GLYCOSYLATED A1C: CPT | Performed by: PHYSICIAN ASSISTANT

## 2023-06-09 NOTE — PROGRESS NOTES
Subjective:    Goyo Cole is a 77 year old male who presents with chief complaint of diabetes check.  Type 2 diabetes, generally well controlled.  Taking his medications as directed.  Mealtime insulin 7 units, nighttime insulin 20 units, metformin 500 mg twice daily, Actos 30 mg every day.  Last pharmacy visit was 5/5/2023.  Patient has no concerns about his medications today.  He does have a continuous glucose monitor.  He feels like for the most part sugars have been pretty good recently.    Of note, he has just finished rehab for his left hand.  Had significant pain and decreased movement and range of motion in the left hand status post a cellulitis earlier this year.  He has home exercises that he is going to keep up with.  He has noticed significant improvements in mobility, but still has a loose  and still does have some limited range of motion.  Dominant.    Also, he is now going to regular cardiac rehab.    Patient Active Problem List   Diagnosis     Fatigue     Pure hypercholesterolemia     Essential Hypertension     Renal Insufficiency     Proteinuria     Male Erectile Disorder     Esophageal Reflux     Degenerative joint disease (DJD) of hip     ELIOT on CPAP     Chewing tobacco use     Benign prostatic hyperplasia with lower urinary tract symptoms     Left Spermatocele     Class 1 obesity due to excess calories with serious comorbidity and body mass index (BMI) of 32.0 to 32.9 in adult     Type 2 diabetes mellitus with kidney complication, with long-term current use of insulin (H)     Stented coronary artery     Mitral valve stenosis     Atherosclerosis of native coronary artery of native heart without angina pectoris     Benign neoplasm of ascending colon     Urine frequency     Kidney stone     Cardiac pacemaker in situ     Cellulitis of left upper extremity     Edema of hand       Current Outpatient Medications:      acetaminophen (TYLENOL) 500 MG tablet, Take 1,000 mg by mouth every 6 hours ,  "Disp: , Rfl:      aspirin 81 MG EC tablet, [ASPIRIN 81 MG EC TABLET] Take 81 mg by mouth daily., Disp: , Rfl:      atorvastatin (LIPITOR) 40 MG tablet, TAKE 1 TABLET BY MOUTH EVERY NIGHT AT BEDTIME, Disp: 90 tablet, Rfl: 2     BD EMMETT U/F 32G X 4 MM insulin pen needle, [BD ULTRA-FINE EMMETT PEN NEEDLE 32 GAUGE X 5/32\" NDLE] USE AS DIRECTED, Disp: 300 each, Rfl: 2     calcium citrate-vitamin D (CITRACAL) 315-200 MG-UNIT TABS per tablet, Take 2 tablets by mouth 2 times daily, Disp: , Rfl:      cholecalciferol, vitamin D3, (CHOLECALCIFEROL) 1,000 unit tablet, Take 2,000 Units by mouth daily, Disp: , Rfl:      Continuous Blood Gluc  (FREESTYLE ESTRADA 14 DAY READER) LAURENCE, 1 Application every 14 days, Disp: 2 each, Rfl: 11     Continuous Blood Gluc Sensor (FREESTYLE ESTRADA 14 DAY SENSOR) MISC, 1 each every 14 days, Disp: 2 each, Rfl: 5     CONTOUR NEXT TEST STRIPS strips, [CONTOUR NEXT TEST STRIPS STRIPS] CHECK BLOOD SUGAR BEFORE MEALS AND AT BEDTIME, Disp: 120 strip, Rfl: 10     insulin glargine U-300 (TOUJEO MAX SOLOSTAR) 300 UNIT/ML (2 units dial) pen, Inject 20 Units Subcutaneous At Bedtime, Disp: 15 mL, Rfl: 3     insulin lispro (HUMALOG KWIKPEN) 100 UNIT/ML (1 unit dial) KWIKPEN, Inject 7 Units Subcutaneous 3 times daily (before meals), Disp: 30 mL, Rfl: 3     lisinopril (ZESTRIL) 10 MG tablet, Take 10 mg by mouth daily, Disp: , Rfl:      metFORMIN (GLUCOPHAGE) 500 MG tablet, Take 1 tablet (500 mg) by mouth 2 times daily (with meals), Disp: 180 tablet, Rfl: 0     Metoprolol Succinate (KAPSPARGO) 25 MG CS24, Take 12.5 mg by mouth daily, Disp: , Rfl:      MICROLET LANCET, [MICROLET LANCET] USE TO CHECK BLOOD SUGAR QID as needed, Disp: 100 each, Rfl: 3     miscellaneous medical supply (BLOOD PRESSURE CUFF) Misc, [MISCELLANEOUS MEDICAL SUPPLY (BLOOD PRESSURE CUFF) MISC] Please provide patient with automatic blood pressure cuff.  Diagnosis: hypertension, Disp: 1 each, Rfl: 0     omeprazole 20 MG tablet, Take 20 mg by " mouth daily, Disp: , Rfl:      pioglitazone (ACTOS) 30 MG tablet, Take 1 tablet (30 mg) by mouth daily, Disp: 90 tablet, Rfl: 3      Objective:   Allergies:  Ketamine    Vitals:  Vitals:    06/09/23 1001   BP: 122/64   BP Location: Right arm   Patient Position: Sitting   Cuff Size: Adult Large   Pulse: 82   Resp: 20   Temp: 98.1  F (36.7  C)   TempSrc: Oral   SpO2: 93%   Weight: 130.2 kg (287 lb)       Body mass index is 36.85 kg/m .    Vital signs reviewed.  General: Patient is alert and oriented x 3, in no apparent distress  Cardiac: regular rate and rhythm, murmur present, previously noted  Pulmonary: lungs clear to auscultation bilaterally, no crackles, rales, rhonchi, or wheezing noted  Extremities: Minimal pitting edema on bilateral lower extremities  Diabetic Foot Exam:  Normal Exam.  Normal pedal pulses bilaterally.  Skin appears healthy and without significant injury.  Sensation is intact to monofilament bilaterally.      Results for orders placed or performed in visit on 06/09/23   Hemoglobin A1c     Status: Abnormal   Result Value Ref Range    Hemoglobin A1C 7.2 (H) 0.0 - 5.6 %   CBC with platelets     Status: Abnormal   Result Value Ref Range    WBC Count 5.8 4.0 - 11.0 10e3/uL    RBC Count 3.44 (L) 4.40 - 5.90 10e6/uL    Hemoglobin 12.0 (L) 13.3 - 17.7 g/dL    Hematocrit 37.1 (L) 40.0 - 53.0 %     (H) 78 - 100 fL    MCH 34.9 (H) 26.5 - 33.0 pg    MCHC 32.3 31.5 - 36.5 g/dL    RDW 13.3 10.0 - 15.0 %    Platelet Count 104 (L) 150 - 450 10e3/uL     Other labs pending.      Assessment and Plan:   1. Type 2 diabetes mellitus with chronic kidney disease, with long-term current use of insulin, unspecified CKD stage (H)  Chronic, well controlled.  A1c has gone up a little bit.  For now continue same medications and continue to monitor.  Plan next check in 5 to 6 months, November/December.  A1c: 7.2%  Eye Exam: Up to date  Foot Exam: Done today, normal  Smoking: No  On a statin: Yes  Blood Pressure: Well  controlled  Microalbumin: Up-to-date  On ACE inhibitor: Yes  - Hemoglobin A1c  - Creatinine; Future  - Creatinine  - FOOT EXAM    2. Essential hypertension  Chronic, stable.  Well-controlled on lisinopril and metoprolol.  Continue.  Screening labs up-to-date.  - Creatinine; Future  - Creatinine    3. Renal Insufficiency  Chronic, stable.  Has had mildly elevated creatinine.  Keeping an eye on this.  Screening lab ordered today and I will follow-up with results.  - Creatinine; Future  - Creatinine    4. Thrombocytopenia (H)  Intermittent.  Head a few normal platelet counts recently, now a little bit low again.  Continue to watch this.  No obvious signs of easy bruising or bleeding today.  I reviewed the signs with patient and his wife.  If they notice these or any other problems, they will let us know.  Otherwise planning on closely monitoring this and recheck in a few months.  - CBC with platelets    5. Anemia, unspecified type  Chronic, mild.  Has had mild anemia for a while.  Hemoglobin is improved today as compared to last check.  He is up-to-date on all of his health maintenance.  Continue to monitor and recheck at next visit, sooner if concerns.  - CBC with platelets     6.  Healthcare maintenance  He declines COVID booster today, will probably get it in the fall.  We reviewed shingles vaccine and he is planning to probably get that at his pharmacy soon.    30 minutes spent on the date of the encounter doing chart review, history and exam, and documentation.      This dictation uses voice recognition software, which may contain typographical errors.

## 2023-06-12 DIAGNOSIS — D69.6 THROMBOCYTOPENIA (H): ICD-10-CM

## 2023-06-12 DIAGNOSIS — N28.9 DISORDER OF KIDNEY AND URETER: ICD-10-CM

## 2023-06-12 DIAGNOSIS — E11.22 TYPE 2 DIABETES MELLITUS WITH CHRONIC KIDNEY DISEASE, WITH LONG-TERM CURRENT USE OF INSULIN, UNSPECIFIED CKD STAGE (H): ICD-10-CM

## 2023-06-12 DIAGNOSIS — I10 ESSENTIAL HYPERTENSION: Primary | ICD-10-CM

## 2023-06-12 DIAGNOSIS — Z79.4 TYPE 2 DIABETES MELLITUS WITH CHRONIC KIDNEY DISEASE, WITH LONG-TERM CURRENT USE OF INSULIN, UNSPECIFIED CKD STAGE (H): ICD-10-CM

## 2023-06-15 ENCOUNTER — THERAPY VISIT (OUTPATIENT)
Dept: OCCUPATIONAL THERAPY | Facility: CLINIC | Age: 78
End: 2023-06-15
Payer: MEDICARE

## 2023-06-15 DIAGNOSIS — R60.0 EDEMA OF HAND: ICD-10-CM

## 2023-06-15 DIAGNOSIS — L03.114 CELLULITIS OF LEFT UPPER EXTREMITY: Primary | ICD-10-CM

## 2023-06-15 PROCEDURE — 97110 THERAPEUTIC EXERCISES: CPT | Mod: GO | Performed by: OCCUPATIONAL THERAPIST

## 2023-06-15 NOTE — PROGRESS NOTES
OCCUPATIONAL THERAPY EVALUATION  Type of Visit: Re-evaluation    Subjective   My hand is feeling better and I am able to use it more.     Objective   Pain Level (Scale 0-10)   4/24/2023 6/15/2023   At Rest 0 0   With Use 8-9 0-4     Pain Description  Date 4/24/2023 6/15/2023   Location Hand dorsal  Wrist and hand   Pain Quality Aching discomfort   Frequency intermittent   Intermittent    Pain is worst  daytime Daytime, comes and goes   Exacerbated by  gripping, full motion  Weight bearing   Relieved by rest rest   Progression Gradually improving  Continued improvement     Edema (Circumference measured in cm)   4/24/2023 4/24/2023 6/15/2023    R L L   DWC 19.5 20.5 19.8   Across MP joints 13.8 14.4 13.7     Sensation   WNL's per pt report     ROM  Pain Report: - none  + mild    ++ moderate    +++ severe   Wrist 4/24/2023 4/24/2023 6/15/2023   AROM (PROM) R L L   Extension 60 50 55   Flexion 70 60 55   RD 15 15 20   UD 35 26 30     Composite finger flexion 2-3 cm from DPC  Index Finger 4/24/2023 4/24/2023 6/15/2023   AROM (PROM) R L L   MCP /75 0/75 /75   PIP /91 0/85 /90   DIP /51 0/45 /35     Thumb 4/24/2023 4/24/2023 6/15/2023   AROM  (PROM) R L L   MP /60 /45 /35   IP /60 /60 /50   Kapandji Opposition Scale (0-10/10) 5 4 5     Strength   (Measured in pounds)  Pain Report: - none  + mild    ++ moderate    +++ severe    4/24/2023 4/24/2023 6/15/2023   Trials R L L   1 98 20 62     Lat Pinch 4/24/2023 4/24/2023 6/15/2023   Trials R L L   1 20 15 18     3 Pt Pinch 4/24/2023 4/24/2023 6/15/2023   Trials R L L   1 18 4 14        06/15/23 0500   Appointment Info   Treating Provider Cheli Rain, OTR/L, CHT   Total/Authorized Visits 8   Visits Used 7   Medical Diagnosis L hand cellulitis   OT Tx Diagnosis L hand pain and swelling   Quick Add  Certification   Progress Note/Certification   Start Of Care Date 04/24/23   Therapy Frequency 1 x per week   Predicted Duration 8 weeks   Certification date from 04/24/23    Certification date to 06/19/23   Progress Note Completed Date 06/15/23   OT Goal 1   Goal Identifier Gripping   Goal Description Pt will report decreased pain to be able to open a tight or new jar with mild difficulty   Rationale   (for I with ADL's and household chores)   Goal Progress pain 0-4/10 with mild difficulty   Target Date 06/19/23   Date Met 06/15/23   Objective Measures   Objective Measures Objective Measure 1   Objective Measure 1   Objective Measure see note   Treatment Interventions (OT)   Interventions Therapeutic Procedure/Exercise   Self Care/Home Management   PTRx Self Care 1 Edema Control   PTRx Self Care 1 - Details wear compression glove sleeping as needed   PTRx Self Care 2 Warmth   PTRx Self Care 2 - Details 5-10 mins for stiffness   Therapeutic Procedure/Exercise   Therapeutic Procedure: strength, endurance, ROM, flexibillity minutes (23637) 25   PTRx Ther Proc 1 Finger Active Range of Motion Tendon Glides Fist Series   PTRx Ther Proc 1 - Details 10 reps   PTRx Ther Proc 2 Finger Passive Range of Motion Composite Flexion   PTRx Ther Proc 2 - Details 5 reps hold 20 secs   PTRx Ther Proc 3 Wrist Active Range of Motion Extension and Flexion Combined   PTRx Ther Proc 3 - Details 10 reps   PTRx Ther Proc 4 Active Range of Motion Combined Radial and Ulnar Deviation   PTRx Ther Proc 4 - Details 10 reps   PTRx Ther Proc 5 Forearm Active Range of Motion Combined Pronation and Supination   PTRx Ther Proc 5 - Details 10 reps   PTRx Ther Proc 6 Elbow Active Range of Motion Combined Extension and Flexion   PTRx Ther Proc 6 - Details 10 reps   PTRx Ther Proc 7 Hand Strengthening Gripping   PTRx Ther Proc 7 - Details 30 reps, progress to pink sponge   PTRx Ther Proc 8 Wrist Passive Range of Motion Extension   PTRx Ther Proc 8 - Details 5 reps hold 20 secs   PTRx Ther Proc 9 Wrist Passive Range of Motion Flexion   PTRx Ther Proc 9 - Details 5 reps hold 20 secs   PTRx Ther Proc 10 Wrist Strengthening  Isotonic Extension   PTRx Ther Proc 10 - Details 2# wt 10 reps   PTRx Ther Proc 11 Wrist Strengthening Isotonic Flexion   PTRx Ther Proc 11 - Details 2# wt 10 reps   Skilled Intervention To increase ROM and and strength   Patient Response/Progress tolerated well   Therapeutic Activity   PTRx Ther Act 1 Towel Gathering and Spreading   PTRx Ther Act 1 - Details HEP   Total Session Time   Timed Code Treatment Minutes 25   Total Treatment Time (sum of timed and untimed services) 25     Assessment & Plan   Nice gains with decreased pain and edema and increased ROM and strength.      DISCHARGE  Reason for Discharge: Patient has met all goals.    Equipment Issued: none    Discharge Plan: Patient to continue home program.    Referring Provider:  Elva Falcon    Beginning/End Dates of Progress Note Reporting Period:   4/24/2023 to 06/15/2023    Signing Clinician: Cheli Rain OT

## 2023-06-18 PROBLEM — D64.9 ANEMIA, UNSPECIFIED TYPE: Status: ACTIVE | Noted: 2023-06-18

## 2023-06-18 PROBLEM — D69.6 THROMBOCYTOPENIA (H): Status: ACTIVE | Noted: 2023-06-18

## 2023-07-11 ENCOUNTER — TRANSFERRED RECORDS (OUTPATIENT)
Dept: HEALTH INFORMATION MANAGEMENT | Facility: CLINIC | Age: 78
End: 2023-07-11
Payer: MEDICARE

## 2023-08-11 DIAGNOSIS — E11.9 TYPE 2 DIABETES MELLITUS WITHOUT COMPLICATION, WITHOUT LONG-TERM CURRENT USE OF INSULIN (H): ICD-10-CM

## 2023-08-11 RX ORDER — PEN NEEDLE, DIABETIC 32GX 5/32"
NEEDLE, DISPOSABLE MISCELLANEOUS
Qty: 300 EACH | Refills: 1 | Status: SHIPPED | OUTPATIENT
Start: 2023-08-11 | End: 2024-01-09

## 2023-08-11 NOTE — TELEPHONE ENCOUNTER
"Last Written Prescription Date:  12/29/2022  Last Fill Quantity: 300,  # refills: 2   Last office visit provider:  6/9/2023     Requested Prescriptions   Pending Prescriptions Disp Refills    BD EMMETT U/F 32G X 4 MM insulin pen needle [Pharmacy Med Name: BD ULTRA-FINE PEN NDL 4MMX3 32G X 4 MM Miscellaneous] 300 each 2     Sig: USE AS DIRECTED       Diabetic Supplies Protocol Passed - 8/11/2023  9:09 AM        Passed - Medication is active on med list        Passed - Patient is 18 years of age or older        Passed - Recent (6 mo) or future (30 days) visit within the authorizing provider's specialty     Patient had office visit in the last 6 months or has a visit in the next 30 days with authorizing provider.  See \"Patient Info\" tab in inbasket, or \"Choose Columns\" in Meds & Orders section of the refill encounter.                 Fadia Morales RN 08/11/23 12:34 PM  "

## 2023-09-29 DIAGNOSIS — E11.22 TYPE 2 DIABETES MELLITUS WITH CHRONIC KIDNEY DISEASE, WITH LONG-TERM CURRENT USE OF INSULIN, UNSPECIFIED CKD STAGE (H): ICD-10-CM

## 2023-09-29 DIAGNOSIS — Z79.4 TYPE 2 DIABETES MELLITUS WITH CHRONIC KIDNEY DISEASE, WITH LONG-TERM CURRENT USE OF INSULIN, UNSPECIFIED CKD STAGE (H): ICD-10-CM

## 2023-10-02 ENCOUNTER — TRANSFERRED RECORDS (OUTPATIENT)
Dept: HEALTH INFORMATION MANAGEMENT | Facility: CLINIC | Age: 78
End: 2023-10-02
Payer: MEDICARE

## 2023-10-02 LAB — RETINOPATHY: NEGATIVE

## 2023-12-05 ENCOUNTER — OFFICE VISIT (OUTPATIENT)
Dept: FAMILY MEDICINE | Facility: CLINIC | Age: 78
End: 2023-12-05
Payer: MEDICARE

## 2023-12-05 VITALS
DIASTOLIC BLOOD PRESSURE: 62 MMHG | BODY MASS INDEX: 34.65 KG/M2 | OXYGEN SATURATION: 100 % | WEIGHT: 270 LBS | SYSTOLIC BLOOD PRESSURE: 116 MMHG | TEMPERATURE: 97.8 F | RESPIRATION RATE: 16 BRPM | HEART RATE: 78 BPM | HEIGHT: 74 IN

## 2023-12-05 DIAGNOSIS — Z79.4 TYPE 2 DIABETES MELLITUS WITH CHRONIC KIDNEY DISEASE, WITH LONG-TERM CURRENT USE OF INSULIN, UNSPECIFIED CKD STAGE (H): ICD-10-CM

## 2023-12-05 DIAGNOSIS — E11.22 TYPE 2 DIABETES MELLITUS WITH CHRONIC KIDNEY DISEASE, WITH LONG-TERM CURRENT USE OF INSULIN, UNSPECIFIED CKD STAGE (H): ICD-10-CM

## 2023-12-05 DIAGNOSIS — R42 DIZZINESS: ICD-10-CM

## 2023-12-05 DIAGNOSIS — D69.6 THROMBOCYTOPENIA (H): ICD-10-CM

## 2023-12-05 DIAGNOSIS — I10 ESSENTIAL HYPERTENSION: ICD-10-CM

## 2023-12-05 DIAGNOSIS — I25.10 ATHEROSCLEROSIS OF NATIVE CORONARY ARTERY OF NATIVE HEART WITHOUT ANGINA PECTORIS: ICD-10-CM

## 2023-12-05 DIAGNOSIS — Z00.00 ENCOUNTER FOR MEDICARE ANNUAL WELLNESS EXAM: Primary | ICD-10-CM

## 2023-12-05 DIAGNOSIS — N28.9 DISORDER OF KIDNEY AND URETER: ICD-10-CM

## 2023-12-05 LAB
ERYTHROCYTE [DISTWIDTH] IN BLOOD BY AUTOMATED COUNT: 12.9 % (ref 10–15)
HBA1C MFR BLD: 6.8 % (ref 0–5.6)
HCT VFR BLD AUTO: 37.2 % (ref 40–53)
HGB BLD-MCNC: 11.9 G/DL (ref 13.3–17.7)
MCH RBC QN AUTO: 35.1 PG (ref 26.5–33)
MCHC RBC AUTO-ENTMCNC: 32 G/DL (ref 31.5–36.5)
MCV RBC AUTO: 110 FL (ref 78–100)
PLATELET # BLD AUTO: 115 10E3/UL (ref 150–450)
RBC # BLD AUTO: 3.39 10E6/UL (ref 4.4–5.9)
WBC # BLD AUTO: 7.7 10E3/UL (ref 4–11)

## 2023-12-05 PROCEDURE — G0438 PPPS, INITIAL VISIT: HCPCS | Performed by: PHYSICIAN ASSISTANT

## 2023-12-05 PROCEDURE — 85027 COMPLETE CBC AUTOMATED: CPT | Performed by: PHYSICIAN ASSISTANT

## 2023-12-05 PROCEDURE — 83036 HEMOGLOBIN GLYCOSYLATED A1C: CPT | Performed by: PHYSICIAN ASSISTANT

## 2023-12-05 PROCEDURE — 82043 UR ALBUMIN QUANTITATIVE: CPT | Performed by: PHYSICIAN ASSISTANT

## 2023-12-05 PROCEDURE — 80048 BASIC METABOLIC PNL TOTAL CA: CPT | Performed by: PHYSICIAN ASSISTANT

## 2023-12-05 PROCEDURE — 99214 OFFICE O/P EST MOD 30 MIN: CPT | Mod: 25 | Performed by: PHYSICIAN ASSISTANT

## 2023-12-05 PROCEDURE — 82570 ASSAY OF URINE CREATININE: CPT | Performed by: PHYSICIAN ASSISTANT

## 2023-12-05 PROCEDURE — 36415 COLL VENOUS BLD VENIPUNCTURE: CPT | Performed by: PHYSICIAN ASSISTANT

## 2023-12-05 RX ORDER — RESPIRATORY SYNCYTIAL VIRUS VACCINE 120MCG/0.5
0.5 KIT INTRAMUSCULAR ONCE
Qty: 1 EACH | Refills: 0 | Status: CANCELLED | OUTPATIENT
Start: 2023-12-05 | End: 2023-12-05

## 2023-12-05 ASSESSMENT — ENCOUNTER SYMPTOMS
SORE THROAT: 0
DYSURIA: 0
JOINT SWELLING: 0
FREQUENCY: 0
NAUSEA: 0
SHORTNESS OF BREATH: 1
ARTHRALGIAS: 0
CHILLS: 1
HEMATOCHEZIA: 0
FEVER: 0
WEAKNESS: 0
PARESTHESIAS: 0
NERVOUS/ANXIOUS: 0
DIARRHEA: 0
COUGH: 1
HEADACHES: 0
CONSTIPATION: 1
HEARTBURN: 0
EYE PAIN: 0
HEMATURIA: 0
DIZZINESS: 1
ABDOMINAL PAIN: 0
PALPITATIONS: 0
MYALGIAS: 0

## 2023-12-05 ASSESSMENT — ACTIVITIES OF DAILY LIVING (ADL): CURRENT_FUNCTION: NO ASSISTANCE NEEDED

## 2023-12-05 NOTE — PATIENT INSTRUCTIONS
"Patient Education   Personalized Prevention Plan  You are due for the preventive services outlined below.  Your care team is available to assist you in scheduling these services.  If you have already completed any of these items, please share that information with your care team to update in your medical record.  Health Maintenance Due   Topic Date Due     Discuss Advance Care Planning  Never done     Zoster (Shingles) Vaccine (1 of 2) Never done     RSV VACCINE (Pregnancy & 60+) (1 - 1-dose 60+ series) Never done     Annual Wellness Visit  Never done     ANNUAL REVIEW OF HM ORDERS  07/12/2023     COVID-19 Vaccine (6 - 2023-24 season) 09/01/2023     Cholesterol Lab  11/16/2023     Kidney Microalbumin Urine Test  11/16/2023     A1C Lab  12/09/2023     Learning About Being Physically Active  What is physical activity?     Being physically active means doing any kind of activity that gets your body moving.  The types of physical activity that can help you get fit and stay healthy include:  Aerobic or \"cardio\" activities. These make your heart beat faster and make you breathe harder, such as brisk walking, riding a bike, or running. They strengthen your heart and lungs and build up your endurance.  Strength training activities. These make your muscles work against, or \"resist,\" something. Examples include lifting weights or doing push-ups. These activities help tone and strengthen your muscles and bones.  Stretches. These let you move your joints and muscles through their full range of motion. Stretching helps you be more flexible.  Reaching a balance between these three types of physical activity is important because each one contributes to your overall fitness.  What are the benefits of being active?  Being active is one of the best things you can do for your health. It helps you to:  Feel stronger and have more energy to do all the things you like to do.  Focus better at school or work.  Feel, think, and sleep " "better.  Reach and stay at a healthy weight.  Lose fat and build lean muscle.  Lower your risk for serious health problems, including diabetes, heart attack, high blood pressure, and some cancers.  Keep your heart, lungs, bones, muscles, and joints strong and healthy.  How can you make being active part of your life?  Start slowly. Make it your long-term goal to get at least 30 minutes of exercise on most days of the week. Walking is a good choice. You also may want to do other activities, such as running, swimming, cycling, or playing tennis or team sports.  Pick activities that you like--ones that make your heart beat faster, your muscles stronger, and your muscles and joints more flexible. If you find more than one thing you like doing, do them all. You don't have to do the same thing every day.  Get your heart pumping every day. Any activity that makes your heart beat faster and keeps it at that rate for a while counts.  Here are some great ways to get your heart beating faster:  Go for a brisk walk, run, or hike.  Go for a swim or bike ride.  Take an online exercise class or dance.  Play a game of touch football, basketball, or soccer.  Play tennis, pickleball, or racquetball.  Climb stairs.  Even some household chores can be aerobic. Just do them at a faster pace. Raking or mowing the lawn, sweeping the garage, and vacuuming and cleaning your home all can help get your heart rate up.  Strengthen your muscles during the week. You don't have to lift heavy weights or grow big, bulky muscles to get stronger. Doing a few simple activities that make your muscles work against, or \"resist,\" something can help you get stronger. Aim for at least twice a week.  For example, you can:  Do push-ups or sit-ups, which use your own body weight as resistance.  Lift weights or dumbbells or use stretch bands at home or in a gym or community center.  Stretch your muscles often. Stretching will help you as you become more active. " "It can help you stay flexible and loosen tight muscles. It can also help improve your balance and posture and can be a great way to relax.  Be sure to stretch the muscles you'll be using when you work out. It's best to warm your muscles slightly before you stretch them. Walk or do some other light aerobic activity for a few minutes. Then start stretching.  When you stretch your muscles:  Do it slowly. Stretching is not about going fast or making sudden movements.  Don't push or bounce during a stretch.  Hold each stretch for at least 15 to 30 seconds, if you can. You should feel a stretch in the muscle, but not pain.  Breathe out as you do the stretch. Then breathe in as you hold the stretch. Don't hold your breath.  If you're worried about how more activity might affect your health, have a checkup before you start. Follow any special advice your doctor gives you for getting a smart start.  Where can you learn more?  Go to https://www.[employer]/patiented  Enter W332 in the search box to learn more about \"Learning About Being Physically Active.\"  Current as of: June 6, 2023               Content Version: 13.8    3392-8607 [employer].   Care instructions adapted under license by your healthcare professional. If you have questions about a medical condition or this instruction, always ask your healthcare professional. [employer] disclaims any warranty or liability for your use of this information.      Learning About Dietary Guidelines  What are the Dietary Guidelines for Americans?     Dietary Guidelines for Americans provide tips for eating well and staying healthy. This helps reduce the risk for long-term (chronic) diseases.  These guidelines recommend that you:  Eat and drink the right amount for you. The [place] government's food guide is called MyPlate. It can help you make your own well-balanced eating plan.  Try to balance your eating with your activity. This helps you stay at a " "healthy weight.  Drink alcohol in moderation, if at all.  Limit foods high in salt, saturated fat, trans fat, and added sugar.  These guidelines are from the [place] Department of Agriculture and the [place] Department of Health and Human Services. They are updated every 5 years.  What is MyPlate?  MyPlate is the [place] government's food guide. It can help you make your own well-balanced eating plan. A balanced eating plan means that you eat enough, but not too much, and that your food gives you the nutrients you need to stay healthy.  MyPlate focuses on eating plenty of whole grains, fruits, and vegetables, and on limiting fat and sugar. It is available online at www.ChooseMyPlate.gov.  How can you get started?  If you're trying to eat healthier, you can slowly change your eating habits over time. You don't have to make big changes all at once. Start by adding one or two healthy foods to your meals each day.  Grains  Choose whole-grain breads and cereals and whole-wheat pasta and whole-grain crackers.  Vegetables  Eat a variety of vegetables every day. They have lots of nutrients and are part of a heart-healthy diet.  Fruits  Eat a variety of fruits every day. Fruits contain lots of nutrients. Choose fresh fruit instead of fruit juice.  Protein foods  Choose fish and lean poultry more often. Eat red meat and fried meats less often. Dried beans, tofu, and nuts are also good sources of protein.  Dairy  Choose low-fat or fat-free products from this food group. If you have problems digesting milk, try eating cheese or yogurt instead.  Fats and oils  Limit fats and oils if you're trying to cut calories. Choose healthy fats when you cook. These include canola oil and olive oil.  Where can you learn more?  Go to https://www.healthwise.net/patiented  Enter D676 in the search box to learn more about \"Learning About Dietary Guidelines.\"  Current as of: February 28, 2023               Content Version: 13.8    6264-4178 [employer] " [employer].   Care instructions adapted under license by your healthcare professional. If you have questions about a medical condition or this instruction, always ask your healthcare professional. [employer] disclaims any warranty or liability for your use of this information.

## 2023-12-05 NOTE — PROGRESS NOTES
"SUBJECTIVE:   Goyo is a 78 year old, presenting for the following:  Wellness Visit    Overall doing well.  Brought in his sugars today and they are all in the lower 100s.    He has noticed increased difficulty urinating recently.  He has a urology appointment upcoming.  Has had a few dizzy spells in the past few weeks.  Maybe 1 a week.  One was when he was backing his car out of the driveway.  The second was when he was standing and looking out a window.  Does not seem to be linked with standing up quickly.  He describes the room is spinning.  No presyncopal type symptoms.  Last usually 5 to 10 minutes.  No nausea.  He had dizzy spells in the past which were possibly related to Ozempic side effect.  He says these episodes feel similar.  He did have a significant vestibular rehab work-up in the past.  Saw Neurology for dizziness, last visit 10/10/22.  At his last visit with Neuro, dizziness had resolved, so no further work-up was recommended at that time.    Has pacemaker which was checked recently in the were told everything was functioning pretty normally.        12/5/2023     1:09 PM   Additional Questions   Roomed by aleyda   Accompanied by wife       Are you in the first 12 months of your Medicare coverage?  Yes,  Visual Acuity:  Right Eye: 10/12.5   Left Eye: 10/12.5  Both Eyes: 10/12.5    Healthy Habits:     In general, how would you rate your overall health?  Good    Frequency of exercise:  None    Do you usually eat at least 4 servings of fruit and vegetables a day, include whole grains    & fiber and avoid regularly eating high fat or \"junk\" foods?  No    Taking medications regularly:  Yes    Medication side effects:  None    Ability to successfully perform activities of daily living:  No assistance needed    Home Safety:  Lack of grab bars in the bathroom    Hearing Impairment:  No hearing concerns    In the past 6 months, have you been bothered by leaking of urine?  No    In general, how would you rate " your overall mental or emotional health?  Excellent    Additional concerns today:  No      Today's PHQ-2 Score:       12/5/2023    12:49 PM   PHQ-2 ( 1999 Pfizer)   Q1: Little interest or pleasure in doing things 0   Q2: Feeling down, depressed or hopeless 0   PHQ-2 Score 0   Q1: Little interest or pleasure in doing things Not at all   Q2: Feeling down, depressed or hopeless Not at all   PHQ-2 Score 0       Have you ever done Advance Care Planning? (For example, a Health Directive, POLST, or a discussion with a medical provider or your loved ones about your wishes): Yes, patient states has an Advance Care Planning document and will bring a copy to the clinic.    Fall risk  Fallen 2 or more times in the past year?: No  Any fall with injury in the past year?: No    Cognitive Screening   1) Repeat 3 items (Finger, river, nation)    2) Clock draw: NORMAL  3) 3 item recall: Recalls 3 objects  Results: NORMAL clock, 1-2 items recalled: COGNITIVE IMPAIRMENT LESS LIKELY    Mini-CogTM Copyright SHAUN Huizar. Licensed by the author for use in [facility]; reprinted with permission (john@[facility]). All rights reserved.      Do you have sleep apnea, excessive snoring or daytime drowsiness? : yes    Reviewed and updated as needed this visit by clinical staff   Tobacco  Allergies  Meds              Reviewed and updated as needed this visit by Provider                 Social History     Tobacco Use    Smoking status: Former    Smokeless tobacco: Former     Types: Chew     Quit date: 1/1/2016   Substance Use Topics    Alcohol use: Yes     Alcohol/week: 7.0 standard drinks of alcohol     Comment: Alcoholic Drinks/day: once a week           12/5/2023    12:49 PM   Alcohol Use   Prescreen: >3 drinks/day or >7 drinks/week? No     Do you have a current opioid prescription? No  Do you use any other controlled substances or medications that are not prescribed by a provider? Alcohol, rarely        Current providers sharing in  care for this patient include:   Patient Care Team:  Magalis Tucker PA-C as PCP - General  Magalis Tucker PA-C as Assigned PCP  Jake Ortega, PharmD as Pharmacist (Pharmacist)  Chris Denny DO as MD (Neurology)  Jake Ortega, PharmD as Assigned MTM Pharmacist  Chris Denny DO as Assigned Neuroscience Provider  Elva Falcon PA-C as Assigned Musculoskeletal Provider    The following health maintenance items are reviewed in Epic and correct as of today:  Health Maintenance   Topic Date Due    ADVANCE CARE PLANNING  Never done    ZOSTER IMMUNIZATION (1 of 2) Never done    RSV VACCINE (Pregnancy & 60+) (1 - 1-dose 60+ series) Never done    MEDICARE ANNUAL WELLNESS VISIT  Never done    ANNUAL REVIEW OF HM ORDERS  07/12/2023    COVID-19 Vaccine (6 - 2023-24 season) 09/01/2023    LIPID  11/16/2023    MICROALBUMIN  11/16/2023    A1C  12/09/2023    BMP  03/30/2024    DIABETIC FOOT EXAM  06/09/2024    EYE EXAM  10/02/2024    FALL RISK ASSESSMENT  12/05/2024    DTAP/TDAP/TD IMMUNIZATION (3 - Td or Tdap) 04/26/2029    HEPATITIS C SCREENING  Completed    PHQ-2 (once per calendar year)  Completed    INFLUENZA VACCINE  Completed    Pneumococcal Vaccine: 65+ Years  Completed    IPV IMMUNIZATION  Aged Out    HPV IMMUNIZATION  Aged Out    MENINGITIS IMMUNIZATION  Aged Out    RSV MONOCLONAL ANTIBODY  Aged Out    COLORECTAL CANCER SCREENING  Discontinued    LUNG CANCER SCREENING  Discontinued         Review of Systems   Constitutional:  Positive for chills. Negative for fever.   HENT:  Positive for congestion. Negative for ear pain, hearing loss and sore throat.    Eyes:  Negative for pain and visual disturbance.   Respiratory:  Positive for cough and shortness of breath.    Cardiovascular:  Negative for chest pain, palpitations and peripheral edema.   Gastrointestinal:  Positive for constipation. Negative for abdominal pain, diarrhea, heartburn, hematochezia and  "nausea.   Genitourinary:  Positive for impotence. Negative for dysuria, frequency, genital sores, hematuria, penile discharge and urgency.   Musculoskeletal:  Negative for arthralgias, joint swelling and myalgias.   Skin:  Negative for rash.   Neurological:  Positive for dizziness. Negative for weakness, headaches and paresthesias.   Psychiatric/Behavioral:  Negative for mood changes. The patient is not nervous/anxious.        OBJECTIVE:   /62 (BP Location: Left arm, Patient Position: Sitting, Cuff Size: Adult Large)   Pulse 78   Temp 97.8  F (36.6  C) (Temporal)   Resp 16   Ht 1.88 m (6' 2\")   Wt 122.5 kg (270 lb)   SpO2 100%   BMI 34.67 kg/m   Estimated body mass index is 34.67 kg/m  as calculated from the following:    Height as of this encounter: 1.88 m (6' 2\").    Weight as of this encounter: 122.5 kg (270 lb).  Physical Exam  GENERAL: healthy, alert and no distress  EYES: Eyes grossly normal to inspection, PERRL and conjunctivae and sclerae normal  HENT: ear canals and TM's normal  NECK: no adenopathy, no asymmetry, masses, or scars and thyroid normal to palpation  RESP: lungs clear to auscultation - no rales, rhonchi or wheezes  CV: regular rate and rhythm, normal S1 S2, no S3 or S4, no murmur, click or rub, no peripheral edema and peripheral pulses strong  ABDOMEN: soft, nontender, no hepatosplenomegaly, no masses and bowel sounds normal  MS: no gross musculoskeletal defects noted, no edema  SKIN: no suspicious lesions or rashes  NEURO: Normal strength and tone, mentation intact and speech normal  PSYCH: mentation appears normal, affect normal/bright    Today's labs pending.      ASSESSMENT / PLAN:   1. Encounter for Medicare annual wellness exam  Flu and RSV vaccines on 11/8/2023.  He got COVID and pneumonia vaccines on 11/29/2023.  He is planning on getting his shingles vaccine within the next month or so.  Screening labs ordered and I will follow-up with results.  He notes he does have a " living will/honoring choices paperwork.  They have at home and have informed family members of where it is.  They are not sure if he has it in his chart.  I will have someone try to look for that.    2. Type 2 diabetes mellitus with chronic kidney disease, with long-term current use of insulin, unspecified CKD stage (H)  Chronic, stable.  Diabetes well controlled.  Continue with metformin 500 mg twice a day, Actos 30 mg daily, 28s of Lantus daily, 7 units of mealtime insulin with meals.  Has continuous glucose monitor.  Follow-up in 5 months for next diabetic check.  A1c: 6.8%  Eye Exam: Up-to-date  Foot Exam: Done today, normal.  Smoking: No  On a statin: Yes  Blood Pressure: Well controlled  Microalbumin: Done today, results pending  On ACE inhibitor: Yes  - Albumin Random Urine Quantitative with Creat Ratio; Future  - Hemoglobin A1c  - Basic metabolic panel  - CBC with platelets  - Albumin Random Urine Quantitative with Creat Ratio    3. Essential Hypertension  Chronic, stable.  Well-controlled on lisinopril and metoprolol.  Continue these.  Screening labs ordered and I will follow-up with results.  - Basic metabolic panel  - CBC with platelets    4. Renal Insufficiency  Chronic, stable.  Has had mildly elevated creatinine in the past.  Continue to monitor.  Screening labs ordered I will follow-up with results.  No acute concerns.  - Basic metabolic panel    5. Thrombocytopenia (H24)  Chronic, stable.  Has had mild anemia and mild thrombocytopenia in the past.  Continuing to monitor.  Screening labs ordered and I will follow-up with results.  No acute concerns.  - CBC with platelets    6. Atherosclerosis of native coronary artery of native heart without angina pectoris  Chronic, stable.  Following with Cardiology.    7. Dizziness  Chronic, recurrent.  Has had a few mild episodes of dizziness in the past few weeks.  States he feels more like it is the room spinning, no presyncopal type symptoms.  He had dizziness  "in the past, but that was thought to possibly be a side effect from Ozempic.  He has had vestibular evaluation before.  Saw Neuro over a year ago, but symptoms had resolved by the time of his visit.  Has pacemaker, following with Cardiology.  He and his wife will continue to monitor this closely.  If he continues to have these dizzy episodes, will have him follow-up with Neuro.        COUNSELING:  Reviewed preventive health counseling, as reflected in patient instructions      BMI:   Estimated body mass index is 34.67 kg/m  as calculated from the following:    Height as of this encounter: 1.88 m (6' 2\").    Weight as of this encounter: 122.5 kg (270 lb).   Weight management plan: Discussed healthy diet and exercise guidelines      He reports that he has quit smoking. He quit smokeless tobacco use about 7 years ago.  His smokeless tobacco use included chew.      Appropriate preventive services were discussed with this patient, including applicable screening as appropriate for fall prevention, nutrition, physical activity, Tobacco-use cessation, weight loss and cognition.  Checklist reviewing preventive services available has been given to the patient.    Reviewed patients plan of care and provided an AVS. The Intermediate Care Plan ( asthma action plan, low back pain action plan, and migraine action plan) for Goyo meets the Care Plan requirement. This Care Plan has been established and reviewed with the Patient and wife .  Prior to immunization administration, verified patients identity using patient s name and date of birth. Please see Immunization Activity for additional information.     Screening Questionnaire for Adult Immunization    Are you sick today?   No   Do you have allergies to medications, food, a vaccine component or latex?   No   Have you ever had a serious reaction after receiving a vaccination?   Yes   Do you have a long-term health problem with heart, lung, kidney, or metabolic disease (e.g., " diabetes), asthma, a blood disorder, no spleen, complement component deficiency, a cochlear implant, or a spinal fluid leak?  Are you on long-term aspirin therapy?   No   Do you have cancer, leukemia, HIV/AIDS, or any other immune system problem?   No   Do you have a parent, brother, or sister with an immune system problem?   No   In the past 3 months, have you taken medications that affect  your immune system, such as prednisone, other steroids, or anticancer drugs; drugs for the treatment of rheumatoid arthritis, Crohn s disease, or psoriasis; or have you had radiation treatments?   No   Have you had a seizure, or a brain or other nervous system problem?   No   During the past year, have you received a transfusion of blood or blood    products, or been given immune (gamma) globulin or antiviral drug?   No   For women: Are you pregnant or is there a chance you could become       pregnant during the next month?   No   Have you received any vaccinations in the past 4 weeks?   No     Immunization questionnaire was positive for at least one answer.  Notified .      Patient instructed to remain in clinic for 15 minutes afterwards, and to report any adverse reactions.     Screening performed by Dee Rodas MA on 12/5/2023 at 1:18 PM.          Magalis Tucker PA-C  [facility]    Identified Health Risks:  I have reviewed Opioid Use Disorder and Substance Use Disorder risk factors and made any needed referrals.

## 2023-12-06 LAB
ANION GAP SERPL CALCULATED.3IONS-SCNC: 10 MMOL/L (ref 7–15)
BUN SERPL-MCNC: 23.1 MG/DL (ref 8–23)
CALCIUM SERPL-MCNC: 9.5 MG/DL (ref 8.8–10.2)
CHLORIDE SERPL-SCNC: 105 MMOL/L (ref 98–107)
CREAT SERPL-MCNC: 1.56 MG/DL (ref 0.67–1.17)
CREAT UR-MCNC: 148 MG/DL
DEPRECATED HCO3 PLAS-SCNC: 25 MMOL/L (ref 22–29)
EGFRCR SERPLBLD CKD-EPI 2021: 45 ML/MIN/1.73M2
GLUCOSE SERPL-MCNC: 127 MG/DL (ref 70–99)
MICROALBUMIN UR-MCNC: 13.7 MG/L
MICROALBUMIN/CREAT UR: 9.26 MG/G CR (ref 0–17)
POTASSIUM SERPL-SCNC: 4.6 MMOL/L (ref 3.4–5.3)
SODIUM SERPL-SCNC: 140 MMOL/L (ref 135–145)

## 2023-12-09 PROBLEM — I50.31 ACUTE DIASTOLIC HEART FAILURE (H): Status: RESOLVED | Noted: 2023-03-20 | Resolved: 2023-12-09

## 2023-12-23 ENCOUNTER — TELEPHONE (OUTPATIENT)
Dept: FAMILY MEDICINE | Facility: CLINIC | Age: 78
End: 2023-12-23
Payer: MEDICARE

## 2023-12-23 DIAGNOSIS — R42 DIZZINESS: Primary | ICD-10-CM

## 2023-12-23 NOTE — TELEPHONE ENCOUNTER
Hello,    This is [facility] questing a few things for this patient's Medicare order of the Freestyle Rowan 14-Day CGM.    The first is requesting the clinic notes from 12/5/23 to be signed per Medicare compliance.      The second request is for new prescriptions for Freestyle Rowan 14-Day Sensors. The prescription needs to be written by the provider in which the patient was seen for their diabetes.     PRESCRIPTION REQUIREMENTS: must be written this way for Medicare Compliance    FREESTYLE ROWAN 14 DAY SENSORS  SIG: Change every 14 days.  QTY: 2   REFILLS: 5    *Prescriptions must be written after the clinical note date and will only be able to be used for 6 months from the date of the clinical notes. (We will be requesting new clinical notes and prescriptions every 6 months to meet Medicare Guidelines).     **Please authorize 5 refills for SENSORS if provider approves a 6-month supply. Medicare Requirements state that pharmacies must adhere to the number of refills listed on the prescription for a Medicare Part-B Patient regardless of the written quantity dispensed per fill.      If you have any questions regarding these requests please call us at [phone] or send a message to the [employer]      Thank you,    [facility]  [phone]

## 2023-12-27 DIAGNOSIS — E11.9 TYPE 2 DIABETES MELLITUS WITHOUT COMPLICATION, WITH LONG-TERM CURRENT USE OF INSULIN (H): ICD-10-CM

## 2023-12-27 DIAGNOSIS — Z79.4 TYPE 2 DIABETES MELLITUS WITHOUT COMPLICATION, WITH LONG-TERM CURRENT USE OF INSULIN (H): ICD-10-CM

## 2023-12-27 RX ORDER — FLASH GLUCOSE SENSOR
1 KIT MISCELLANEOUS
Qty: 2 EACH | Refills: 5 | Status: SHIPPED | OUTPATIENT
Start: 2023-12-27 | End: 2024-04-10

## 2023-12-27 NOTE — TELEPHONE ENCOUNTER
PRESCRIPTIONS MUST BE WRITTEN THIS WAY TO MAKE THEM MEDICARE COMPLIANT    FREESTYLE ESTRADA 14 DAY SENSORS  SIG: Change every 14 days.  QTY: 2  REFILLS: 5    -Prescriptions must be written after the clinical note date and will only be able to be used for 6 months from the date of the clinical notes. All prescriptions must be from same provider who patient had visit with. (We will be requesting new clinical notes and prescriptions every 6 months to meet Medicare Guidelines.)    Please contact us at [phone] (this number is for clinics only) with any questions. Please only give [phone] to patients.    [facility]   [address] [place] [address]  Phone # [phone]  Fax # [phone]

## 2023-12-28 NOTE — TELEPHONE ENCOUNTER
Contacted patient and patient's wife and relayed message below from Magalis BARRERA.  Patient still having weekly dizzy spells and wants referral to Neurology. Also patient will bring in a copy of his Advanced Directive at next appointment.    Message routed to PCP, Magalis BARRERA for a referral.    Brittney Fields RN  [facility]

## 2024-01-09 DIAGNOSIS — E11.9 TYPE 2 DIABETES MELLITUS WITHOUT COMPLICATION, WITHOUT LONG-TERM CURRENT USE OF INSULIN (H): ICD-10-CM

## 2024-01-09 RX ORDER — PEN NEEDLE, DIABETIC 32GX 5/32"
NEEDLE, DISPOSABLE MISCELLANEOUS
Qty: 300 EACH | Refills: 1 | Status: SHIPPED | OUTPATIENT
Start: 2024-01-09 | End: 2024-06-17

## 2024-02-17 DIAGNOSIS — E11.22 TYPE 2 DIABETES MELLITUS WITH CHRONIC KIDNEY DISEASE, WITH LONG-TERM CURRENT USE OF INSULIN, UNSPECIFIED CKD STAGE (H): ICD-10-CM

## 2024-02-17 DIAGNOSIS — Z79.4 TYPE 2 DIABETES MELLITUS WITH CHRONIC KIDNEY DISEASE, WITH LONG-TERM CURRENT USE OF INSULIN, UNSPECIFIED CKD STAGE (H): ICD-10-CM

## 2024-02-20 RX ORDER — INSULIN GLARGINE 300 U/ML
20 INJECTION, SOLUTION SUBCUTANEOUS AT BEDTIME
Qty: 6 ML | Refills: 3 | Status: SHIPPED | OUTPATIENT
Start: 2024-02-20

## 2024-03-03 ENCOUNTER — HEALTH MAINTENANCE LETTER (OUTPATIENT)
Age: 79
End: 2024-03-03

## 2024-03-04 ENCOUNTER — OFFICE VISIT (OUTPATIENT)
Dept: NEUROLOGY | Facility: CLINIC | Age: 79
End: 2024-03-04
Attending: PHYSICIAN ASSISTANT
Payer: MEDICARE

## 2024-03-04 VITALS — HEART RATE: 78 BPM | SYSTOLIC BLOOD PRESSURE: 155 MMHG | DIASTOLIC BLOOD PRESSURE: 83 MMHG

## 2024-03-04 DIAGNOSIS — R42 DIZZINESS: ICD-10-CM

## 2024-03-04 PROCEDURE — 99213 OFFICE O/P EST LOW 20 MIN: CPT | Performed by: PSYCHIATRY & NEUROLOGY

## 2024-03-04 NOTE — PROGRESS NOTES
"[facility] Neurology Follow Up Visit    Goyo Cole MRN# [id number]   Age: 78 year old YOB: 1945     Brief history of symptoms: The patient was initially seen in neurologic consultation on 10/10/2022 for evaluation of dizziness. Please see the comprehensive neurologic consultation notes from those dates in the Epic records for details.     Overall impression after our initial visit was that the patient likely had medication induced vertigo. Her ozempic was though to be the etiology. Her exam was relatively benign and she was asked to follow up if new issues arose to consider brain or vascular studies.    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    Today, the patient tells me he is no longer on ozempic. He still has dizziness, but it is difficult for him to describe it.  He has no issues of vertigo, or spinning. He mentions that he may get dizzy after standing but not necessarily light-headed. He describes standing up and walking then feeling like \"something not right\".  There are no issues of arm weakness, leg weakness, or passing out.  He doesn't feel he has behavioral or personality or cognitive issues after an event.     Physical Exam:   Vitals: BP (!) 155/83 (BP Location: Right arm, Patient Position: Sitting, Cuff Size: Adult Regular)   Pulse 78    General: Seated comfortably in no acute distress.  HEENT: Neck supple with normal range of motion.   Skin: No rashes  Neurologic:     Mental Status: Fully alert, attentive and oriented. Speech clear and fluent, no paraphasic errors.     Cranial Nerves: EOMI with normal smooth pursuit. Facial movements symmetric. Hearing not formally tested but intact to conversation.  No dysarthria.     Motor: No tremors or other abnormal movements observed.          Assessment and Plan:   Assessment:  Diabetic polyneuropathy, likely with some autonomic " component  Dizziness    The patient still has dizziness episodes without room spinning and of which are more of a sense of being off. This often happens with positional components, and could be related to vascular phenomena of the head or neck. Further imaging would be helpful to rule these possible etiologies out.  If negative, there may be further investigation towards atypical cardiac rhythm etiologies by means of a holter or ziopatch.     Plan:  MRI brain w/wout contrast  CTA head and neck  Continue with use of cane for ambulation, went over fall risks with patient today    Follow up in Neurology clinic in 3-5 months, or earlier as needed should new concerns arise.    TALAT Denny D.O.   of Neurology    Total time today (25 min) in this patient encounter was spent on pre-charting, counseling and/or coordination of care.

## 2024-03-04 NOTE — LETTER
"    3/4/2024         RE: Goyo Cole  [address]  [place] [address]        Dear Colleague,    Thank you for referring your patient, Goyo Cole, to the [facility]. Please see a copy of my visit note below.    [facility] Neurology Follow Up Visit    Goyo Cole MRN# [id number]   Age: 78 year old YOB: 1945     Brief history of symptoms: The patient was initially seen in neurologic consultation on 10/10/2022 for evaluation of dizziness. Please see the comprehensive neurologic consultation notes from those dates in the Epic records for details.     Overall impression after our initial visit was that the patient likely had medication induced vertigo. Her ozempic was though to be the etiology. Her exam was relatively benign and she was asked to follow up if new issues arose to consider brain or vascular studies.    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    Today, the patient tells me he is no longer on ozempic. He still has dizziness, but it is difficult for him to describe it.  He has no issues of vertigo, or spinning. He mentions that he may get dizzy after standing but not necessarily light-headed. He describes standing up and walking then feeling like \"something not right\".  There are no issues of arm weakness, leg weakness, or passing out.  He doesn't feel he has behavioral or personality or cognitive issues after an event.     Physical Exam:   Vitals: BP (!) 155/83 (BP Location: Right arm, Patient Position: Sitting, Cuff Size: Adult Regular)   Pulse 78    General: Seated comfortably in no acute distress.  HEENT: Neck supple with normal range of motion.   Skin: No rashes  Neurologic:     Mental Status: Fully alert, attentive and oriented. Speech clear and fluent, no paraphasic errors.     Cranial Nerves: EOMI with normal smooth pursuit. " Facial movements symmetric. Hearing not formally tested but intact to conversation.  No dysarthria.     Motor: No tremors or other abnormal movements observed.          Assessment and Plan:   Assessment:  Diabetic polyneuropathy, likely with some autonomic component  Dizziness    The patient still has dizziness episodes without room spinning and of which are more of a sense of being off. This often happens with positional components, and could be related to vascular phenomena of the head or neck. Further imaging would be helpful to rule these possible etiologies out.  If negative, there may be further investigation towards atypical cardiac rhythm etiologies by means of a holter or ziopatch.     Plan:  MRI brain w/wout contrast  CTA head and neck  Continue with use of cane for ambulation, went over fall risks with patient today    Follow up in Neurology clinic in 3-5 months, or earlier as needed should new concerns arise.    TALAT Denny D.O.   of Neurology    Total time today (25 min) in this patient encounter was spent on pre-charting, counseling and/or coordination of care.       Again, thank you for allowing me to participate in the care of your patient.        Sincerely,        Chris Denny, DO

## 2024-03-04 NOTE — PATIENT INSTRUCTIONS
I think you should look into the dizziness with obtaining imaging of your head and neck.  - MRI brain   - CTA head and neck    Continue to use a cane for walking, as I have found that you have a neuropathy on your exam today. I suspect it is related to diabetes and is likely leading to a great deal of imbalance.  Be careful in low light condition, or conditions where your stance is narrow.

## 2024-03-06 ENCOUNTER — TELEPHONE (OUTPATIENT)
Dept: NEUROLOGY | Facility: CLINIC | Age: 79
End: 2024-03-06
Payer: MEDICARE

## 2024-03-11 NOTE — TELEPHONE ENCOUNTER
Is the implanted device safe for MRI Exam? Yes  Is this device 3T compatible? Yes  Device Type: Pacemaker      Device Information: Medtronic, PPM Dearing (D)      Cardiology Orders for Device Programming     -- Yes -- The patient has a MRI conditional pulse generator and leads from the same     -- Yes -- The pulse generator and leads have been implanted for at least 6 weeks. (Does not apply to [employer] devices)    -- Yes-- The device is implanted in the right or left pectoral region    -- Yes -- There are not any additional active cardiac devices, abandoned leads, lead extenders or adapters (not [facility] patient, needs Xray verification)    -- Yes -- The device lead impedance measurements are within the normal range. (Manufacture recommendations: Medtronic Advisa and Revo 200-1,500 ohms; Medtronic ICD and CRT's 200-3000 ohms and defibrillation lead impedance   ohms)    -- Yes -- If the patient is pacemaker dependent the thresholds are less than or equal to 2.0V @ 0.4ms.    -- Yes -- RA and RV leads are programmed to bipolar pacing operation or pacing off. If no, CONTACT THE DEVICE REP FOR PROGRAMMING     Date of last Remote Device check: 3/6/2024  Results of last Device check:  1.   Right atrium impedance: 456 Ohms   2.   Right ventricle impedance: 456 Ohms   3.   Left ventricle impedance: n/a      4.   Right atrium threshold: 1.125V @ 0.4 ms   5.   Right ventricle threshold: 0.875V @ 0.4 ms   6.   Left ventricle threshold: n/a      Device programming during the scan guidelines   Pacing Mode (check one): Use the recommended pacing mode per Medtronic MRI Access Application  Pacing Rate: n/a   bpm   If remote reprogramming is applicable, please contact the Rep to assist      Isa Jerez RN

## 2024-03-13 NOTE — TELEPHONE ENCOUNTER
Please call pt:    All his labs look good.  Blood sugars are under good control.  Kidney function is a little better than last time we checked.    Please see how his dizzy spells are doing.  If he is continuing to have them, we should have him follow-up with neurology.  I can put in a new referral if needed.    Also, can you please see if his Advanced Directives are in his chart?  I couldn't find them in EPIC, but I might not have been looking in the right places.   R1 OB Labor Note    S: Patient seen and examined at bedside. Discussed with patient getting early epidural in case we need to go back to the OR emergently as she continues to have large decelerations. Patient did not agree to epidural, however was amenable to AROM and IUPC placement with possibility of amnioinfusion. Patient was amenable. AROM to scant fluid, IUPC placed, and patient had large deceleration again. Brought patient back to OR for closer monitoring and possible emergent C/S. In the operating room, FHR was found to be again in the 140s, so decision was made to monitor FHR in the OR and patient was amenable to getting an epidural placed. Epidural placed by anesthesia.    T(C): 36.9 (03-13-24 @ 15:31), Max: 36.9 (03-13-24 @ 13:30)  HR: 108 (03-13-24 @ 18:20) (93 - 116)  BP: 116/56 (03-13-24 @ 18:20) (106/52 - 140/68)  RR: 16 (03-13-24 @ 15:31) (16 - 16)  SpO2: 99% (03-13-24 @ 18:20) (75% - 100%)

## 2024-04-08 DIAGNOSIS — Z79.4 TYPE 2 DIABETES MELLITUS WITHOUT COMPLICATION, WITH LONG-TERM CURRENT USE OF INSULIN (H): ICD-10-CM

## 2024-04-08 DIAGNOSIS — E11.9 TYPE 2 DIABETES MELLITUS WITHOUT COMPLICATION, WITH LONG-TERM CURRENT USE OF INSULIN (H): ICD-10-CM

## 2024-04-08 NOTE — TELEPHONE ENCOUNTER
Pt is requesting updated rx for    PRESCRIPTIONS MUST BE WRITTEN THIS WAY TO MAKE THEM MEDICARE COMPLIANT    FREESTYLE ESTRADA 14 DAY SENSORS  SIG: Change every 14 days.  QTY: 6  REFILLS: 1    -Prescriptions must be written after the clinical note date and will only be able to be used for 6 months from the date of the clinical notes. All prescriptions must be from same provider who patient had visit with. (We will be requesting new clinical notes and prescriptions every 6 months to meet Medicare Guidelines.)    Please contact us at [phone] (this number is for clinics only) with any questions. Please only give [phone] to patients.    [facility]   [address] [place] [address]  Phone # [phone]  Fax # [phone]

## 2024-04-10 RX ORDER — FLASH GLUCOSE SENSOR
1 KIT MISCELLANEOUS
Qty: 2 EACH | Refills: 5 | Status: SHIPPED | OUTPATIENT
Start: 2024-04-10 | End: 2024-04-11

## 2024-04-11 ENCOUNTER — TELEPHONE (OUTPATIENT)
Dept: FAMILY MEDICINE | Facility: CLINIC | Age: 79
End: 2024-04-11
Payer: MEDICARE

## 2024-04-11 DIAGNOSIS — Z79.4 TYPE 2 DIABETES MELLITUS WITHOUT COMPLICATION, WITH LONG-TERM CURRENT USE OF INSULIN (H): ICD-10-CM

## 2024-04-11 DIAGNOSIS — E11.9 TYPE 2 DIABETES MELLITUS WITHOUT COMPLICATION, WITH LONG-TERM CURRENT USE OF INSULIN (H): ICD-10-CM

## 2024-04-11 RX ORDER — FLASH GLUCOSE SENSOR
1 KIT MISCELLANEOUS
Qty: 6 EACH | Refills: 1 | Status: SHIPPED | OUTPATIENT
Start: 2024-04-11 | End: 2024-06-10

## 2024-04-11 NOTE — TELEPHONE ENCOUNTER
The order for Rowan sensors was updated from #2 + 5 refills to  #6 + 1 refill, due to Medicare B compliance guidelines (medicare will now sometimes pay for 3 months at a time).  A new Rx was sent to pharmacy and order was updated in Epic.  Per Prescription Modification CPA

## 2024-04-23 ENCOUNTER — HOSPITAL ENCOUNTER (OUTPATIENT)
Dept: RADIOLOGY | Facility: HOSPITAL | Age: 79
Discharge: HOME | End: 2024-04-23
Attending: PSYCHIATRY & NEUROLOGY
Payer: MEDICARE

## 2024-04-23 ENCOUNTER — HOSPITAL ENCOUNTER (OUTPATIENT)
Dept: CT IMAGING | Facility: HOSPITAL | Age: 79
Discharge: HOME | End: 2024-04-23
Attending: PSYCHIATRY & NEUROLOGY
Payer: MEDICARE

## 2024-04-23 ENCOUNTER — HOSPITAL ENCOUNTER (OUTPATIENT)
Dept: MRI IMAGING | Facility: HOSPITAL | Age: 79
Discharge: HOME | End: 2024-04-23
Attending: PSYCHIATRY & NEUROLOGY
Payer: MEDICARE

## 2024-04-23 DIAGNOSIS — R42 DIZZINESS: ICD-10-CM

## 2024-04-23 LAB
CREAT BLD-MCNC: 1.9 MG/DL (ref 0.7–1.3)
EGFRCR SERPLBLD CKD-EPI 2021: 36 ML/MIN/1.73M2

## 2024-04-23 PROCEDURE — 82565 ASSAY OF CREATININE: CPT

## 2024-04-23 PROCEDURE — 250N000011 HC RX IP 250 OP 636: Performed by: PSYCHIATRY & NEUROLOGY

## 2024-04-23 PROCEDURE — 70553 MRI BRAIN STEM W/O & W/DYE: CPT | Mod: MG

## 2024-04-23 PROCEDURE — 255N000002 HC RX 255 OP 636: Performed by: PSYCHIATRY & NEUROLOGY

## 2024-04-23 PROCEDURE — A9585 GADOBUTROL INJECTION: HCPCS | Performed by: PSYCHIATRY & NEUROLOGY

## 2024-04-23 PROCEDURE — 70496 CT ANGIOGRAPHY HEAD: CPT | Mod: MG

## 2024-04-23 PROCEDURE — 71045 X-RAY EXAM CHEST 1 VIEW: CPT

## 2024-04-23 RX ORDER — GADOBUTROL 604.72 MG/ML
12 INJECTION INTRAVENOUS ONCE
Status: COMPLETED | OUTPATIENT
Start: 2024-04-23 | End: 2024-04-23

## 2024-04-23 RX ORDER — IOPAMIDOL 755 MG/ML
75 INJECTION, SOLUTION INTRAVASCULAR ONCE
Status: COMPLETED | OUTPATIENT
Start: 2024-04-23 | End: 2024-04-23

## 2024-04-23 RX ADMIN — IOPAMIDOL 75 ML: 755 INJECTION, SOLUTION INTRAVENOUS at 14:43

## 2024-04-23 RX ADMIN — GADOBUTROL 12 ML: 604.72 INJECTION INTRAVENOUS at 13:44

## 2024-05-29 DIAGNOSIS — E11.22 TYPE 2 DIABETES MELLITUS WITH CHRONIC KIDNEY DISEASE, WITH LONG-TERM CURRENT USE OF INSULIN, UNSPECIFIED CKD STAGE (H): ICD-10-CM

## 2024-05-29 DIAGNOSIS — Z79.4 TYPE 2 DIABETES MELLITUS WITH CHRONIC KIDNEY DISEASE, WITH LONG-TERM CURRENT USE OF INSULIN, UNSPECIFIED CKD STAGE (H): ICD-10-CM

## 2024-05-29 RX ORDER — INSULIN LISPRO 100 [IU]/ML
INJECTION, SOLUTION INTRAVENOUS; SUBCUTANEOUS
Qty: 15 ML | Refills: 3 | Status: SHIPPED | OUTPATIENT
Start: 2024-05-29 | End: 2024-07-24

## 2024-06-06 ENCOUNTER — OFFICE VISIT (OUTPATIENT)
Dept: FAMILY MEDICINE | Facility: CLINIC | Age: 79
End: 2024-06-06
Payer: MEDICARE

## 2024-06-06 VITALS
WEIGHT: 277 LBS | HEIGHT: 74 IN | BODY MASS INDEX: 35.55 KG/M2 | DIASTOLIC BLOOD PRESSURE: 76 MMHG | RESPIRATION RATE: 20 BRPM | TEMPERATURE: 97.4 F | SYSTOLIC BLOOD PRESSURE: 151 MMHG | OXYGEN SATURATION: 100 % | HEART RATE: 85 BPM

## 2024-06-06 DIAGNOSIS — E11.22 TYPE 2 DIABETES MELLITUS WITH STAGE 3A CHRONIC KIDNEY DISEASE, WITH LONG-TERM CURRENT USE OF INSULIN (H): ICD-10-CM

## 2024-06-06 DIAGNOSIS — N18.31 TYPE 2 DIABETES MELLITUS WITH STAGE 3A CHRONIC KIDNEY DISEASE, WITH LONG-TERM CURRENT USE OF INSULIN (H): ICD-10-CM

## 2024-06-06 DIAGNOSIS — Z79.4 TYPE 2 DIABETES MELLITUS WITH STAGE 3A CHRONIC KIDNEY DISEASE, WITH LONG-TERM CURRENT USE OF INSULIN (H): ICD-10-CM

## 2024-06-06 DIAGNOSIS — I10 ESSENTIAL HYPERTENSION: ICD-10-CM

## 2024-06-06 DIAGNOSIS — I25.10 ATHEROSCLEROSIS OF NATIVE CORONARY ARTERY OF NATIVE HEART WITHOUT ANGINA PECTORIS: Primary | ICD-10-CM

## 2024-06-06 DIAGNOSIS — D69.6 THROMBOCYTOPENIA (H): ICD-10-CM

## 2024-06-06 DIAGNOSIS — E78.00 PURE HYPERCHOLESTEROLEMIA: ICD-10-CM

## 2024-06-06 PROBLEM — Z72.0 CHEWING TOBACCO USE: Status: RESOLVED | Noted: 2021-01-31 | Resolved: 2024-06-06

## 2024-06-06 LAB
ERYTHROCYTE [DISTWIDTH] IN BLOOD BY AUTOMATED COUNT: 13.2 % (ref 10–15)
HBA1C MFR BLD: 7.2 % (ref 0–5.6)
HCT VFR BLD AUTO: 36.9 % (ref 40–53)
HGB BLD-MCNC: 11.9 G/DL (ref 13.3–17.7)
MCH RBC QN AUTO: 35 PG (ref 26.5–33)
MCHC RBC AUTO-ENTMCNC: 32.2 G/DL (ref 31.5–36.5)
MCV RBC AUTO: 109 FL (ref 78–100)
PLATELET # BLD AUTO: 110 10E3/UL (ref 150–450)
RBC # BLD AUTO: 3.4 10E6/UL (ref 4.4–5.9)
WBC # BLD AUTO: 8.1 10E3/UL (ref 4–11)

## 2024-06-06 PROCEDURE — 36415 COLL VENOUS BLD VENIPUNCTURE: CPT | Performed by: PHYSICIAN ASSISTANT

## 2024-06-06 PROCEDURE — 99214 OFFICE O/P EST MOD 30 MIN: CPT | Performed by: PHYSICIAN ASSISTANT

## 2024-06-06 PROCEDURE — 84460 ALANINE AMINO (ALT) (SGPT): CPT | Performed by: PHYSICIAN ASSISTANT

## 2024-06-06 PROCEDURE — 85027 COMPLETE CBC AUTOMATED: CPT | Performed by: PHYSICIAN ASSISTANT

## 2024-06-06 PROCEDURE — G2211 COMPLEX E/M VISIT ADD ON: HCPCS | Performed by: PHYSICIAN ASSISTANT

## 2024-06-06 PROCEDURE — 83036 HEMOGLOBIN GLYCOSYLATED A1C: CPT | Performed by: PHYSICIAN ASSISTANT

## 2024-06-06 PROCEDURE — 80048 BASIC METABOLIC PNL TOTAL CA: CPT | Performed by: PHYSICIAN ASSISTANT

## 2024-06-07 LAB
ALT SERPL W P-5'-P-CCNC: 19 U/L (ref 0–70)
ANION GAP SERPL CALCULATED.3IONS-SCNC: 12 MMOL/L (ref 7–15)
BUN SERPL-MCNC: 23.6 MG/DL (ref 8–23)
CALCIUM SERPL-MCNC: 9.2 MG/DL (ref 8.8–10.2)
CHLORIDE SERPL-SCNC: 106 MMOL/L (ref 98–107)
CREAT SERPL-MCNC: 1.59 MG/DL (ref 0.67–1.17)
DEPRECATED HCO3 PLAS-SCNC: 23 MMOL/L (ref 22–29)
EGFRCR SERPLBLD CKD-EPI 2021: 44 ML/MIN/1.73M2
GLUCOSE SERPL-MCNC: 182 MG/DL (ref 70–99)
POTASSIUM SERPL-SCNC: 4.6 MMOL/L (ref 3.4–5.3)
SODIUM SERPL-SCNC: 141 MMOL/L (ref 135–145)

## 2024-06-10 ENCOUNTER — TELEPHONE (OUTPATIENT)
Dept: FAMILY MEDICINE | Facility: CLINIC | Age: 79
End: 2024-06-10

## 2024-06-10 ENCOUNTER — OFFICE VISIT (OUTPATIENT)
Dept: NEUROLOGY | Facility: CLINIC | Age: 79
End: 2024-06-10
Payer: MEDICARE

## 2024-06-10 ENCOUNTER — MYC MEDICAL ADVICE (OUTPATIENT)
Dept: FAMILY MEDICINE | Facility: CLINIC | Age: 79
End: 2024-06-10

## 2024-06-10 VITALS — SYSTOLIC BLOOD PRESSURE: 144 MMHG | HEART RATE: 78 BPM | DIASTOLIC BLOOD PRESSURE: 80 MMHG

## 2024-06-10 DIAGNOSIS — R42 DIZZINESS: Primary | ICD-10-CM

## 2024-06-10 DIAGNOSIS — Z79.4 TYPE 2 DIABETES MELLITUS WITHOUT COMPLICATION, WITH LONG-TERM CURRENT USE OF INSULIN (H): ICD-10-CM

## 2024-06-10 DIAGNOSIS — E11.22 TYPE 2 DIABETES MELLITUS WITH CHRONIC KIDNEY DISEASE, WITH LONG-TERM CURRENT USE OF INSULIN, UNSPECIFIED CKD STAGE (H): Primary | ICD-10-CM

## 2024-06-10 DIAGNOSIS — E11.9 TYPE 2 DIABETES MELLITUS WITHOUT COMPLICATION, WITH LONG-TERM CURRENT USE OF INSULIN (H): ICD-10-CM

## 2024-06-10 DIAGNOSIS — Z79.4 TYPE 2 DIABETES MELLITUS WITH CHRONIC KIDNEY DISEASE, WITH LONG-TERM CURRENT USE OF INSULIN, UNSPECIFIED CKD STAGE (H): Primary | ICD-10-CM

## 2024-06-10 PROCEDURE — 99214 OFFICE O/P EST MOD 30 MIN: CPT | Performed by: PSYCHIATRY & NEUROLOGY

## 2024-06-10 RX ORDER — FLASH GLUCOSE SENSOR
1 KIT MISCELLANEOUS
Qty: 6 EACH | Refills: 1 | Status: SHIPPED | OUTPATIENT
Start: 2024-06-10

## 2024-06-10 NOTE — NURSING NOTE
Chief Complaint   Patient presents with    Follow Up     Return       Shivani Sams MA on 6/10/2024 at 11:28 AM

## 2024-06-10 NOTE — TELEPHONE ENCOUNTER
This message is to inform you that we are in need of Medicare compliant prescriptions for Freestyle Rowan 14-Day Sensors.     Prescription must be written by: Magalis Tucker.  Must include full supply name: Freestyle Rowan 14-Day Sensor  Quantity: 6  Refills: 1  SIG: Change every 14 days    As a reminder, Medicare requires patients to be seen every 3 months for insulin supplies and every 6 months for CGMs. The provider who sees the patient must be the provider on the prescription, must be written on the day of or after office visit date and office visit must include notes about diabetes and insulin regimen. The Diabetes Care Services team at [facility] may request new prescriptions before renewal dates of the prescription is filled over the allowable amount. Writing the order to match what is above will help ensure we will only need to request every 3 or 6 months.    Thank you!    [facility]  Diabetes Care Services Team  [address] [place] [address]  Provider Phone: [phone]  Patient Line: [phone]  Fax: [phone]  E-mail: [email or web address]

## 2024-06-10 NOTE — TELEPHONE ENCOUNTER
I refilled this according to your recommendations below.  Please let me know if further adjustments need to be made.    Thanks.

## 2024-06-10 NOTE — LETTER
"6/10/2024      Goyo Cole  [address]  [place] [address]      Dear Colleague,    Thank you for referring your patient, Goyo Cole, to the [facility]. Please see a copy of my visit note below.    [facility] Neurology Follow Up Visit    Goyo Cole MRN# [id number]   Age: 78 year old YOB: 1945     Brief history of symptoms: The patient was initially seen in neurologic consultation on 10/10/2022 and most recently 3/4/2024 for evaluation of dizziness. Please see the comprehensive neurologic consultation notes from those dates in the Epic records for details.     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  At our last visit, he was to obtain an MRI brain and CTA head and neck.    Interval history:   - MRI brain 4/23/2024 showed no specific infarction, tumor, or regional atrophy.  There was moderate Small vessel ischemic disease.  - CTA head and neck 4/23/2024 showed no stenosis, or aneurysm. There was calcifications at origin of V2 and V4 segments b/l with dominant left vertebral artery.    Today, the patient is here with his wife.  He again doesn't feel he has room spinning, light-headedness, but has a feeling of \"high\" or \"off\" lasting a minute.  He hasn't really spoken out loud when it occurs, hasn't noted weakness, and hasn't noted sensory changes.  It may occur while sitting or when standing.       Physical Exam:   Vitals: BP (!) 144/80 (BP Location: Right arm, Patient Position: Sitting)   Pulse 78    General: Seated comfortably in no acute distress.  Neurologic:     Mental Status: Fully alert, attentive and oriented. Speech clear and fluent, no paraphasic errors.     Cranial Nerves: EOMI with normal smooth pursuit. Facial movements " symmetric. Hearing not formally tested but intact to conversation.  No dysarthria.     Motor: No tremors or other abnormal movements observed. SA, BF, TE, WF, WE, FF, FE, Nii, thumb abduction all 5/5 b/l. HF, HE, KF, KE, PF, DF is 5/5 b/l.     Sensory:Negative Romberg. Vibration at toes is reduced (under 2-4 seconds) but at ankles it is 8+ seconds b/l. Otherwise, pinprick and light touch is normal in arms and legs.     Coordination: Finger-nose-finger without dysmetria.     Gait: hesitant gait. Good arm swing. Mild sensorimotor ataxia but not falling.         Assessment and Plan:   Assessment:  Transient imbalance    The patient has normal MRI brain (some microvascular ischemic changes of note, and basal ganglia lacunes), as well as relatively normal CTA head and neck.  His imbalance is not in line clinically with vestibular deficits of labyrinths or utricle/saccule, nor vestibulitis.  His absence of syncopal symptoms isn't really consistent with carotid or posterior circulation restriction.  His neuropathy is mild at best, and unlikely a predominant cause of autonomic dysfunction given lack of GI, pupillary, and orthostatic hypotension.  We discussed that a referral to vestibular rehab would be unlikely to help him at this time.  We also discussed his noted SVID on imaging (small vessel ischemic disease) and that he should present to an ED should his imbalance episodes last longer than 10-15 minutes (usually last about 1-2 minutes), especially if associated with other neurological deficits.    Plan:  Follow up in Neurology clinic as needed should new concerns arise.    TALAT Denny D.O.   of Neurology    Total time today (36 min) in this patient encounter was spent on pre-charting, counseling and/or coordination of care.       Again, thank you for allowing me to participate in the care of your patient.        Sincerely,        Chris Denny, DO

## 2024-06-10 NOTE — PROGRESS NOTES
"[facility] Neurology Follow Up Visit    Goyo Cole MRN# [id number]   Age: 78 year old YOB: 1945     Brief history of symptoms: The patient was initially seen in neurologic consultation on 10/10/2022 and most recently 3/4/2024 for evaluation of dizziness. Please see the comprehensive neurologic consultation notes from those dates in the Epic records for details.     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  At our last visit, he was to obtain an MRI brain and CTA head and neck.    Interval history:   - MRI brain 4/23/2024 showed no specific infarction, tumor, or regional atrophy.  There was moderate Small vessel ischemic disease.  - CTA head and neck 4/23/2024 showed no stenosis, or aneurysm. There was calcifications at origin of V2 and V4 segments b/l with dominant left vertebral artery.    Today, the patient is here with his wife.  He again doesn't feel he has room spinning, light-headedness, but has a feeling of \"high\" or \"off\" lasting a minute.  He hasn't really spoken out loud when it occurs, hasn't noted weakness, and hasn't noted sensory changes.  It may occur while sitting or when standing.       Physical Exam:   Vitals: BP (!) 144/80 (BP Location: Right arm, Patient Position: Sitting)   Pulse 78    General: Seated comfortably in no acute distress.  Neurologic:     Mental Status: Fully alert, attentive and oriented. Speech clear and fluent, no paraphasic errors.     Cranial Nerves: EOMI with normal smooth pursuit. Facial movements symmetric. Hearing not formally tested but intact to conversation.  No dysarthria.     Motor: No tremors or other abnormal movements observed. SA, BF, TE, WF, WE, FF, FE, Nii, thumb abduction all 5/5 b/l. HF, HE, KF, KE, PF, DF is 5/5 b/l.     Sensory:Negative " Romberg. Vibration at toes is reduced (under 2-4 seconds) but at ankles it is 8+ seconds b/l. Otherwise, pinprick and light touch is normal in arms and legs.     Coordination: Finger-nose-finger without dysmetria.     Gait: hesitant gait. Good arm swing. Mild sensorimotor ataxia but not falling.         Assessment and Plan:   Assessment:  Transient imbalance    The patient has normal MRI brain (some microvascular ischemic changes of note, and basal ganglia lacunes), as well as relatively normal CTA head and neck.  His imbalance is not in line clinically with vestibular deficits of labyrinths or utricle/saccule, nor vestibulitis.  His absence of syncopal symptoms isn't really consistent with carotid or posterior circulation restriction.  His neuropathy is mild at best, and unlikely a predominant cause of autonomic dysfunction given lack of GI, pupillary, and orthostatic hypotension.  We discussed that a referral to vestibular rehab would be unlikely to help him at this time.  We also discussed his noted SVID on imaging (small vessel ischemic disease) and that he should present to an ED should his imbalance episodes last longer than 10-15 minutes (usually last about 1-2 minutes), especially if associated with other neurological deficits.    Plan:  Follow up in Neurology clinic as needed should new concerns arise.    TALAT Denny D.O.   of Neurology    Total time today (36 min) in this patient encounter was spent on pre-charting, counseling and/or coordination of care.

## 2024-06-17 DIAGNOSIS — E11.9 TYPE 2 DIABETES MELLITUS WITHOUT COMPLICATION, WITHOUT LONG-TERM CURRENT USE OF INSULIN (H): ICD-10-CM

## 2024-06-17 RX ORDER — PEN NEEDLE, DIABETIC 32GX 5/32"
NEEDLE, DISPOSABLE MISCELLANEOUS
Qty: 300 EACH | Refills: 1 | Status: SHIPPED | OUTPATIENT
Start: 2024-06-17

## 2024-06-21 ENCOUNTER — MYC MEDICAL ADVICE (OUTPATIENT)
Dept: FAMILY MEDICINE | Facility: CLINIC | Age: 79
End: 2024-06-21
Payer: MEDICARE

## 2024-06-25 VITALS — DIASTOLIC BLOOD PRESSURE: 65 MMHG | SYSTOLIC BLOOD PRESSURE: 135 MMHG

## 2024-06-26 ENCOUNTER — TRANSFERRED RECORDS (OUTPATIENT)
Dept: HEALTH INFORMATION MANAGEMENT | Facility: CLINIC | Age: 79
End: 2024-06-26
Payer: MEDICARE

## 2024-07-02 DIAGNOSIS — E11.22 TYPE 2 DIABETES MELLITUS WITH CHRONIC KIDNEY DISEASE, WITH LONG-TERM CURRENT USE OF INSULIN, UNSPECIFIED CKD STAGE (H): ICD-10-CM

## 2024-07-02 DIAGNOSIS — Z79.4 TYPE 2 DIABETES MELLITUS WITH CHRONIC KIDNEY DISEASE, WITH LONG-TERM CURRENT USE OF INSULIN, UNSPECIFIED CKD STAGE (H): ICD-10-CM

## 2024-07-02 RX ORDER — PIOGLITAZONEHYDROCHLORIDE 30 MG/1
30 TABLET ORAL DAILY
Qty: 90 TABLET | Refills: 3 | Status: SHIPPED | OUTPATIENT
Start: 2024-07-02

## 2024-07-23 NOTE — PROGRESS NOTES
Medication Therapy Management (MTM) Encounter    ASSESSMENT:                            Medication Adherence/Access: No issues identified    Diabetes   A1C at goal <7.5%. Discussed that tighter control at this age is likely not more beneficial. May increase risk of lows to try to push lower. Continue current therapy. Humalog Rx updated to reflect patient giving a couple extra units if premeal readings are high.     Hyperlipidemia /CAD:  LDL at goal <70.     Hypertension   Blood pressure at goal <140/90. Not quite at goal <130/80. Continue follow-up with cardiology. As patient is having dizziness concerns, likely okay to continue with current regimen.       GERD    Well managed with PPI.       Supplements:   Levels within normal limits.       BPH:   Has not started Tamsulosin. Low risk of dizziness with Tamsulosin, especially compared to other alternatives. Recommended a trial at dinner time as this will be most effective for nigthtime frequency and also have lowest levels of medication during the day when patient would be more likely to be out riding his motorcycle.       PLAN:                            Continue Humalog 7-10 units three times daily with meals.   Okay to start Tamsulosin 0.4 mg - take after dinner.     Follow-up: Schedule with pharmacist as needed     SUBJECTIVE/OBJECTIVE:                          Goyo Cole is a 78 year old male seen for a follow-up visit. Patient was accompanied by wife, Cate.      Last MTM follow-up in May 2023.     Reason for visit: Diabetes Follow-up.    Allergies/ADRs: Reviewed in chart  Past Medical History: Reviewed in chart  Tobacco: He reports that he has quit smoking. He quit smokeless tobacco use about 8 years ago.  His smokeless tobacco use included chew.  Alcohol:  reports current alcohol use of about 7.0 standard drinks of alcohol per week.     Medication Adherence/Access: no issues reported    Diabetes     Humalog 7 units three times daily.   Metformin 500 mg twice  daily   Pioglitazone 30 mg daily   Toujeo 20 units daily     Current diabetes symptoms:     Blood sugar monitoring: Continuous Glucose Monitor     Forgot his meter. Reports breakfast time around 106. Lunch time around 140. Around 165 for supper last night.     Diet/Exercise: Sometimes goes out riding his bike, will skip his insulin. Maybe once a week.  Sometimes gives an extra unit or 2 of insulin if blood glucose are running high.     Hemoglobin A1C   Date Value Ref Range Status   06/06/2024 7.2 (H) 0.0 - 5.6 % Final     Comment:     Normal <5.7%   Prediabetes 5.7-6.4%    Diabetes 6.5% or higher     Note: Adopted from ADA consensus guidelines.   12/05/2023 6.8 (H) 0.0 - 5.6 % Final     Comment:     Normal <5.7%   Prediabetes 5.7-6.4%    Diabetes 6.5% or higher     Note: Adopted from ADA consensus guidelines.   06/09/2023 7.2 (H) 0.0 - 5.6 % Final     Comment:     Normal <5.7%   Prediabetes 5.7-6.4%    Diabetes 6.5% or higher     Note: Adopted from ADA consensus guidelines.      Microalbumin Urine mg/dL   Date Value Ref Range Status   01/21/2021 1.79 0.00 - 1.99 mg/dL Final      Creatinine Urine mg/dL   Date Value Ref Range Status   12/05/2023 148.0 mg/dL Final     Comment:     The reference ranges have not been established in urine creatinine. The results should be integrated into the clinical context for interpretation.             Eye exam is up to date  Foot exam is up to date        Hyperlipidemia /CAD:     Atorvastatin 40 mg daily  Aspirin 81 mg daily   Patient reports no significant myalgias or other side effects.    Was previously on DAPT but discontinued due to bleeding concerns.            Hypertension     Lisinopril 10 mg daily   Metoprolol Succinate 25 mg 0.5 tab daily.       Does continue to have some dizziness. Has had extensive work up for this.     BP Readings from Last 3 Encounters:   07/24/24 137/69   06/25/24 135/65   06/10/24 (!) 144/80      Pulse Readings from Last 3 Encounters:   07/24/24 60  "  06/10/24 78   06/06/24 85     Wt Readings from Last 3 Encounters:   06/06/24 277 lb (125.6 kg)   12/05/23 270 lb (122.5 kg)   06/09/23 287 lb (130.2 kg)           GERD      Omeprazole 20  mg once daily     No concerns with PPI use. \"I can do anything, anytime, anywhere\"            Supplements:   Calcium Citrate-Vitamin D 315-200 mg-units 2 tabs twice daily   Vit D3 2000 units daily     Lab Results   Component Value Date    JENNI 9.2 06/06/2024    VITDT 56 03/08/2022         BPH:   Prescribed Tamsulosin 0.4 mg daily from Urology. Hasn't started because he's worried about getting dizzy.     Having hesitancy and dribbling.       Today's Vitals: /69   Pulse 60   ----------------      I spent 45 minutes with this patient today. All changes were made via collaborative practice agreement with Magalis Tucker PA-C. A copy of the visit note was provided to the patient's provider(s).    A summary of these recommendations was sent via [employer].    Jake Ortega, JuanpabloD  Medication Therapy Management (MTM) Pharmacist  [facility]           Medication Therapy Recommendations  No medication therapy recommendations to display   "

## 2024-07-24 ENCOUNTER — OFFICE VISIT (OUTPATIENT)
Dept: PHARMACY | Facility: CLINIC | Age: 79
End: 2024-07-24
Payer: COMMERCIAL

## 2024-07-24 VITALS — DIASTOLIC BLOOD PRESSURE: 69 MMHG | HEART RATE: 60 BPM | SYSTOLIC BLOOD PRESSURE: 137 MMHG

## 2024-07-24 DIAGNOSIS — E55.9 VITAMIN D DEFICIENCY: ICD-10-CM

## 2024-07-24 DIAGNOSIS — E78.00 PURE HYPERCHOLESTEROLEMIA: Primary | ICD-10-CM

## 2024-07-24 DIAGNOSIS — R39.11 BENIGN PROSTATIC HYPERPLASIA WITH URINARY HESITANCY: ICD-10-CM

## 2024-07-24 DIAGNOSIS — E11.22 TYPE 2 DIABETES MELLITUS WITH CHRONIC KIDNEY DISEASE, WITH LONG-TERM CURRENT USE OF INSULIN, UNSPECIFIED CKD STAGE (H): ICD-10-CM

## 2024-07-24 DIAGNOSIS — Z79.4 TYPE 2 DIABETES MELLITUS WITH CHRONIC KIDNEY DISEASE, WITH LONG-TERM CURRENT USE OF INSULIN, UNSPECIFIED CKD STAGE (H): ICD-10-CM

## 2024-07-24 DIAGNOSIS — I25.810 CORONARY ARTERY DISEASE INVOLVING AUTOLOGOUS ARTERY CORONARY BYPASS GRAFT WITHOUT ANGINA PECTORIS: ICD-10-CM

## 2024-07-24 DIAGNOSIS — N40.1 BENIGN PROSTATIC HYPERPLASIA WITH URINARY HESITANCY: ICD-10-CM

## 2024-07-24 PROCEDURE — 99207 PR NO CHARGE LOS: CPT | Performed by: PHARMACIST

## 2024-07-24 RX ORDER — TAMSULOSIN HYDROCHLORIDE 0.4 MG/1
0.4 CAPSULE ORAL DAILY
COMMUNITY
Start: 2024-02-28

## 2024-07-24 RX ORDER — INSULIN LISPRO 100 [IU]/ML
7-10 INJECTION, SOLUTION INTRAVENOUS; SUBCUTANEOUS
Qty: 15 ML | Refills: 3 | Status: SHIPPED | OUTPATIENT
Start: 2024-07-24

## 2024-07-24 NOTE — PATIENT INSTRUCTIONS
"Recommendations from today's MTM visit:                                                         Continue Humalog 7-10 units three times daily with meals.   Okay to start Tamsulosin 0.4 mg - take after dinner.     Follow-up: No follow-ups on file.    It was great speaking with you today.  I value your experience and would be very thankful for your time in providing feedback in our clinic survey. In the next few days, you may receive an email or text message from [employer] with a link to a survey related to your  clinical pharmacist.\"     To schedule another MTM appointment, please call the clinic directly or you may call the MTM scheduling line at [phone].    My Clinical Pharmacist's contact information:                                                      Please feel free to contact me with any questions or concerns you have.      Jake Ortega, PharmD  Medication Therapy Management (MTM) Pharmacist  [facility]      "

## 2024-07-25 NOTE — PROGRESS NOTES
Addended by: JOSE, EFRA on: 7/25/2024 01:32 PM     Modules accepted: Orders     Subjective:    Goyo Cole is a 78 year old male who presents with chief complaint of diabetes check.  He does continue to enjoy his sweets.  Still working on eating a little healthier and exercising more.  He has made some good improvements to his eating habits.  Eating mini muffins in the morning.  Having Boost drink sometimes with food.  Following up with cardiology and pulmonology, saw both of them recently.  Has upcoming appointment with neurology to review dizziness.  Thus far workup for dizziness has been normal.  Due to get echocardiogram and other heart tests in August.  Has not reported any significantly high sugars on his CGM.      Diabetes:   He presents for follow up of diabetes.  He is checking home blood glucose four or more times daily.   He checks blood glucose before meals and at bedtime.  Blood glucose is sometimes over 200 and never under 70.  When his blood glucose is low, the patient is asymptomatic for confusion, blurred vision, lethargy and reports not feeling dizzy, shaky, or weak.   He has no concerns regarding his diabetes at this time.  He is having weight gain.      He eats 2-3 servings of fruits and vegetables daily.He consumes 1 sweetened beverage(s) daily.He exercises with enough effort to increase his heart rate 9 or less minutes per day.  He exercises with enough effort to increase his heart rate 3 or less days per week.   He is taking medications regularly.      Patient Active Problem List   Diagnosis    Fatigue    Pure hypercholesterolemia    Essential Hypertension    Renal Insufficiency    Proteinuria    Male Erectile Disorder    Esophageal Reflux    Degenerative joint disease (DJD) of hip    ELIOT on CPAP    Benign prostatic hyperplasia with lower urinary tract symptoms    Left Spermatocele    Class 1 obesity due to excess calories with serious comorbidity and body mass index (BMI) of 32.0 to 32.9 in adult    Type 2 diabetes mellitus with kidney complication, with long-term  current use of insulin (H)    Stented coronary artery    Mitral valve stenosis    Atherosclerosis of native coronary artery of native heart without angina pectoris    Benign neoplasm of ascending colon    Urine frequency    Kidney stone    Cardiac pacemaker in situ    Thrombocytopenia (H24)    Anemia, unspecified type       Current Outpatient Medications:     acetaminophen (TYLENOL) 500 MG tablet, Take 1,000 mg by mouth every 6 hours , Disp: , Rfl:     aspirin 81 MG EC tablet, [ASPIRIN 81 MG EC TABLET] Take 81 mg by mouth daily., Disp: , Rfl:     atorvastatin (LIPITOR) 40 MG tablet, TAKE 1 TABLET BY MOUTH EVERY NIGHT AT BEDTIME, Disp: 90 tablet, Rfl: 2    BD EMMETT U/F 32G X 4 MM insulin pen needle, USE AS DIRECTED, Disp: 300 each, Rfl: 1    calcium citrate-vitamin D (CITRACAL) 315-200 MG-UNIT TABS per tablet, Take 2 tablets by mouth 2 times daily, Disp: , Rfl:     cholecalciferol, vitamin D3, (CHOLECALCIFEROL) 1,000 unit tablet, Take 2,000 Units by mouth daily, Disp: , Rfl:     Continuous Blood Gluc  (FREESTYLE ESTRADA 14 DAY READER) [facility], 1 Application every 14 days, Disp: 2 each, Rfl: 11    Continuous Blood Gluc Sensor (FREESTYLE ESTRADA 14 DAY SENSOR) [facility] [place] [facility], 1 each every 14 days .  Change sensor every 14 days., Disp: 6 each, Rfl: 1    CONTOUR NEXT TEST STRIPS strips, [CONTOUR NEXT TEST STRIPS STRIPS] CHECK BLOOD SUGAR BEFORE MEALS AND AT BEDTIME, Disp: 120 strip, Rfl: 10    HUMALOG KWIKPEN 100 UNIT/ML soln, INJECT 7 UNITS SUBCUTANEOUS 3 TIMES DAILY (BEFORE MEALS), Disp: 15 mL, Rfl: 3    lisinopril (ZESTRIL) 10 MG tablet, Take 10 mg by mouth daily, Disp: , Rfl:     metFORMIN (GLUCOPHAGE) 500 MG tablet, TAKE 1 TABLET (500 MG) BY MOUTH 2 TIMES DAILY (WITH MEALS), Disp: 180 tablet, Rfl: 3    Metoprolol Succinate (KAPSPARGO) 25 MG CS24, Take 12.5 mg by mouth daily, Disp: , Rfl:     MICROLET LANCET, [MICROLET LANCET] USE TO CHECK BLOOD SUGAR QID as needed, Disp: 100 each, Rfl: 3    miscellaneous medical supply (BLOOD  "PRESSURE CUFF) [facility] [place], [MISCELLANEOUS MEDICAL SUPPLY (BLOOD PRESSURE CUFF) MISC] Please provide patient with automatic blood pressure cuff.  Diagnosis: hypertension, Disp: 1 each, Rfl: 0    omeprazole 20 MG tablet, Take 20 mg by mouth daily, Disp: , Rfl:     pioglitazone (ACTOS) 30 MG tablet, Take 1 tablet (30 mg) by mouth daily, Disp: 90 tablet, Rfl: 3    TOUJEO MAX SOLOSTAR 300 UNIT/ML (2 units dial) pen, INJECT 20 UNITS SUBCUTANEOUS AT BEDTIME, Disp: 6 mL, Rfl: 3      Objective:   Allergies:  Ketamine    Vitals:  Vitals:    06/06/24 1454 06/06/24 1456   BP: (!) 148/75 (!) 151/76   BP Location: Right arm Right arm   Patient Position: Sitting Sitting   Cuff Size: Adult Large Adult Large   Pulse: 85    Resp: 20    Temp: 97.4  F (36.3  C)    TempSrc: Temporal    SpO2: 100%    Weight: 125.6 kg (277 lb)    Height: 1.88 m (6' 2.02\")        Body mass index is 35.55 kg/m .    Vital signs reviewed.  General: Patient is alert and oriented x 3, in no apparent distress  Cardiac: regular rate and rhythm, murmur present, previously noted  Pulmonary: lungs clear to auscultation bilaterally, no crackles, rales, rhonchi, or wheezing noted  Extremities: No lower extremity edema    Results for orders placed or performed in visit on 06/06/24   CBC with platelets     Status: Abnormal   Result Value Ref Range    WBC Count 8.1 4.0 - 11.0 10e3/uL    RBC Count 3.40 (L) 4.40 - 5.90 10e6/uL    Hemoglobin 11.9 (L) 13.3 - 17.7 g/dL    Hematocrit 36.9 (L) 40.0 - 53.0 %     (H) 78 - 100 fL    MCH 35.0 (H) 26.5 - 33.0 pg    MCHC 32.2 31.5 - 36.5 g/dL    RDW 13.2 10.0 - 15.0 %    Platelet Count 110 (L) 150 - 450 10e3/uL   Hemoglobin A1c     Status: Abnormal   Result Value Ref Range    Hemoglobin A1C 7.2 (H) 0.0 - 5.6 %     Other Labs pending.      Assessment and Plan:   1. Type 2 diabetes mellitus with stage 3a chronic kidney disease, with long-term current use of insulin (H)  Chronic, well-controlled.  A1c has gone up slightly today.  Could " be because of less exercise or eating more sweets.  He has a continuous glucose monitor, did not bring the reader with him today.  For now, continue same medicines.  Work on continuing to cut down a little bit on sweets, working on exercise more.  Follow-up in 6 months.  Taking metformin 500 mg twice a day, Actos 30 mg once a day, Lantus insulin 20 units nightly, mealtime insulin 7 units.  A1c: 7.2%  Eye Exam: Up-to-date  Foot Exam: Up-to-date  Smoking: No  On a statin: Yes  Blood Pressure: Elevated today, generally under good control  Microalbumin: Up-to-date  On ACE inhibitor: Yes  - Basic metabolic panel  - Hemoglobin A1c    2. Essential Hypertension  Chronic, generally well-controlled.  Blood pressure slightly elevated today.  Patient is taking all of his medications as directed.  Plan to do some home readings and then they will contact us in about 2 weeks.  Screening labs ordered and I will follow-up with results.  - Basic metabolic panel    3. Atherosclerosis of native coronary artery of native heart without angina pectoris  Chronic, stable.  Following with cardiology.    4. Thrombocytopenia (H24)  Chronic, stable.  CBC with platelets checked today stable.  - CBC with platelets    5. Pure hypercholesterolemia  Chronic, well-controlled.  Continue with atorvastatin.  Following with cardiology.  I will follow-up with pending labs.  - ALT; Future  - ALT       This dictation uses voice recognition software, which may contain typographical errors.

## 2024-10-08 ENCOUNTER — TRANSFERRED RECORDS (OUTPATIENT)
Dept: MULTI SPECIALTY CLINIC | Facility: CLINIC | Age: 79
End: 2024-10-08

## 2024-10-08 LAB — RETINOPATHY: NORMAL

## 2024-10-23 DIAGNOSIS — Z79.4 TYPE 2 DIABETES MELLITUS WITH CHRONIC KIDNEY DISEASE, WITH LONG-TERM CURRENT USE OF INSULIN, UNSPECIFIED CKD STAGE (H): ICD-10-CM

## 2024-10-23 DIAGNOSIS — E11.22 TYPE 2 DIABETES MELLITUS WITH CHRONIC KIDNEY DISEASE, WITH LONG-TERM CURRENT USE OF INSULIN, UNSPECIFIED CKD STAGE (H): ICD-10-CM

## 2024-10-31 DIAGNOSIS — E11.9 TYPE 2 DIABETES MELLITUS WITHOUT COMPLICATION, WITHOUT LONG-TERM CURRENT USE OF INSULIN (H): ICD-10-CM

## 2024-10-31 RX ORDER — PEN NEEDLE, DIABETIC 32GX 5/32"
NEEDLE, DISPOSABLE MISCELLANEOUS
Qty: 300 EACH | Refills: 0 | Status: SHIPPED | OUTPATIENT
Start: 2024-10-31

## 2024-12-07 ENCOUNTER — HEALTH MAINTENANCE LETTER (OUTPATIENT)
Age: 79
End: 2024-12-07

## 2024-12-07 SDOH — HEALTH STABILITY: PHYSICAL HEALTH: ON AVERAGE, HOW MANY MINUTES DO YOU ENGAGE IN EXERCISE AT THIS LEVEL?: 0 MIN

## 2024-12-07 SDOH — HEALTH STABILITY: PHYSICAL HEALTH: ON AVERAGE, HOW MANY DAYS PER WEEK DO YOU ENGAGE IN MODERATE TO STRENUOUS EXERCISE (LIKE A BRISK WALK)?: 0 DAYS

## 2024-12-07 ASSESSMENT — SOCIAL DETERMINANTS OF HEALTH (SDOH): HOW OFTEN DO YOU GET TOGETHER WITH FRIENDS OR RELATIVES?: MORE THAN THREE TIMES A WEEK

## 2024-12-12 ENCOUNTER — OFFICE VISIT (OUTPATIENT)
Dept: FAMILY MEDICINE | Facility: CLINIC | Age: 79
End: 2024-12-12
Attending: PHYSICIAN ASSISTANT
Payer: MEDICARE

## 2024-12-12 VITALS
RESPIRATION RATE: 16 BRPM | SYSTOLIC BLOOD PRESSURE: 130 MMHG | HEIGHT: 74 IN | DIASTOLIC BLOOD PRESSURE: 60 MMHG | TEMPERATURE: 97.6 F | BODY MASS INDEX: 35.16 KG/M2 | WEIGHT: 274 LBS | OXYGEN SATURATION: 96 % | HEART RATE: 73 BPM

## 2024-12-12 DIAGNOSIS — E11.22 TYPE 2 DIABETES MELLITUS WITH CHRONIC KIDNEY DISEASE, WITH LONG-TERM CURRENT USE OF INSULIN, UNSPECIFIED CKD STAGE (H): ICD-10-CM

## 2024-12-12 DIAGNOSIS — D64.9 ANEMIA, UNSPECIFIED TYPE: ICD-10-CM

## 2024-12-12 DIAGNOSIS — Z00.00 ENCOUNTER FOR MEDICARE ANNUAL WELLNESS EXAM: Primary | ICD-10-CM

## 2024-12-12 DIAGNOSIS — Z79.4 TYPE 2 DIABETES MELLITUS WITH CHRONIC KIDNEY DISEASE, WITH LONG-TERM CURRENT USE OF INSULIN, UNSPECIFIED CKD STAGE (H): ICD-10-CM

## 2024-12-12 DIAGNOSIS — D69.6 THROMBOCYTOPENIA (H): ICD-10-CM

## 2024-12-12 DIAGNOSIS — I10 ESSENTIAL HYPERTENSION: ICD-10-CM

## 2024-12-12 DIAGNOSIS — E78.00 PURE HYPERCHOLESTEROLEMIA: ICD-10-CM

## 2024-12-12 DIAGNOSIS — N28.9 DISORDER OF KIDNEY AND URETER: ICD-10-CM

## 2024-12-12 LAB
ERYTHROCYTE [DISTWIDTH] IN BLOOD BY AUTOMATED COUNT: 13.4 % (ref 10–15)
EST. AVERAGE GLUCOSE BLD GHB EST-MCNC: 140 MG/DL
HBA1C MFR BLD: 6.5 % (ref 0–5.6)
HCT VFR BLD AUTO: 36.9 % (ref 40–53)
HGB BLD-MCNC: 11.7 G/DL (ref 13.3–17.7)
MCH RBC QN AUTO: 35.1 PG (ref 26.5–33)
MCHC RBC AUTO-ENTMCNC: 31.7 G/DL (ref 31.5–36.5)
MCV RBC AUTO: 111 FL (ref 78–100)
PLATELET # BLD AUTO: 124 10E3/UL (ref 150–450)
RBC # BLD AUTO: 3.33 10E6/UL (ref 4.4–5.9)
WBC # BLD AUTO: 7.7 10E3/UL (ref 4–11)

## 2024-12-12 PROCEDURE — 36415 COLL VENOUS BLD VENIPUNCTURE: CPT | Performed by: PHYSICIAN ASSISTANT

## 2024-12-12 PROCEDURE — 82043 UR ALBUMIN QUANTITATIVE: CPT | Performed by: PHYSICIAN ASSISTANT

## 2024-12-12 PROCEDURE — 80048 BASIC METABOLIC PNL TOTAL CA: CPT | Performed by: PHYSICIAN ASSISTANT

## 2024-12-12 PROCEDURE — 83036 HEMOGLOBIN GLYCOSYLATED A1C: CPT | Performed by: PHYSICIAN ASSISTANT

## 2024-12-12 PROCEDURE — 80061 LIPID PANEL: CPT | Performed by: PHYSICIAN ASSISTANT

## 2024-12-12 PROCEDURE — G0439 PPPS, SUBSEQ VISIT: HCPCS | Performed by: PHYSICIAN ASSISTANT

## 2024-12-12 PROCEDURE — 99214 OFFICE O/P EST MOD 30 MIN: CPT | Mod: 25 | Performed by: PHYSICIAN ASSISTANT

## 2024-12-12 PROCEDURE — 99207 PR FOOT EXAM NO CHARGE: CPT | Performed by: PHYSICIAN ASSISTANT

## 2024-12-12 PROCEDURE — 85027 COMPLETE CBC AUTOMATED: CPT | Performed by: PHYSICIAN ASSISTANT

## 2024-12-12 PROCEDURE — 82570 ASSAY OF URINE CREATININE: CPT | Performed by: PHYSICIAN ASSISTANT

## 2024-12-12 PROCEDURE — 82607 VITAMIN B-12: CPT | Performed by: PHYSICIAN ASSISTANT

## 2024-12-12 NOTE — PATIENT INSTRUCTIONS
Patient Education   Preventive Care Advice   This is general advice given by our system to help you stay healthy. However, your care team may have specific advice just for you. Please talk to your care team about your preventive care needs.  Nutrition  Eat 5 or more servings of fruits and vegetables each day.  Try wheat bread, brown rice and whole grain pasta (instead of white bread, rice, and pasta).  Get enough calcium and vitamin D. Check the label on foods and aim for 100% of the RDA (recommended daily allowance).  Lifestyle  Exercise at least 150 minutes each week  (30 minutes a day, 5 days a week).  Do muscle strengthening activities 2 days a week. These help control your weight and prevent disease.  No smoking.  Wear sunscreen to prevent skin cancer.  Have a dental exam and cleaning every 6 months.  Yearly exams  See your health care team every year to talk about:  Any changes in your health.  Any medicines your care team has prescribed.  Preventive care, family planning, and ways to prevent chronic diseases.  Shots (vaccines)   HPV shots (up to age 26), if you've never had them before.  Hepatitis B shots (up to age 59), if you've never had them before.  COVID-19 shot: Get this shot when it's due.  Flu shot: Get a flu shot every year.  Tetanus shot: Get a tetanus shot every 10 years.  Pneumococcal, hepatitis A, and RSV shots: Ask your care team if you need these based on your risk.  Shingles shot (for age 50 and up)  General health tests  Diabetes screening:  Starting at age 35, Get screened for diabetes at least every 3 years.  If you are younger than age 35, ask your care team if you should be screened for diabetes.  Cholesterol test: At age 39, start having a cholesterol test every 5 years, or more often if advised.  Bone density scan (DEXA): At age 50, ask your care team if you should have this scan for osteoporosis (brittle bones).  Hepatitis C: Get tested at least once in your life.  STIs (sexually  transmitted infections)  Before age 24: Ask your care team if you should be screened for STIs.  After age 24: Get screened for STIs if you're at risk. You are at risk for STIs (including HIV) if:  You are sexually active with more than one person.  You don't use condoms every time.  You or a partner was diagnosed with a sexually transmitted infection.  If you are at risk for HIV, ask about PrEP medicine to prevent HIV.  Get tested for HIV at least once in your life, whether you are at risk for HIV or not.  Cancer screening tests  Cervical cancer screening: If you have a cervix, begin getting regular cervical cancer screening tests starting at age 21.  Breast cancer scan (mammogram): If you've ever had breasts, begin having regular mammograms starting at age 40. This is a scan to check for breast cancer.  Colon cancer screening: It is important to start screening for colon cancer at age 45.  Have a colonoscopy test every 10 years (or more often if you're at risk) Or, ask your provider about stool tests like a FIT test every year or Cologuard test every 3 years.  To learn more about your testing options, visit:   .  For help making a decision, visit:   https://bit.ly/nq24071.  Prostate cancer screening test: If you have a prostate, ask your care team if a prostate cancer screening test (PSA) at age 55 is right for you.  Lung cancer screening: If you are a current or former smoker ages 50 to 80, ask your care team if ongoing lung cancer screenings are right for you.  For informational purposes only. Not to replace the advice of your health care provider. Copyright   2023 [facility]. All rights reserved. Clinically reviewed by the [facility]. [employer] 222006 - REV 01/24.  Learning About Activities of Daily Living  What are activities of daily living?     Activities of daily living (ADLs) are the basic self-care tasks you do every day. These include eating, bathing, dressing,  and moving around.  As you age, and if you have health problems, you may find that it's harder to do some of these tasks. If so, your doctor can suggest ideas that may help.  To measure what kind of help you may need, your doctor will ask how well you are able to do ADLs. Let your doctor know if there are any tasks that you are having trouble doing. This is an important first step to getting help. And when you have the help you need, you can stay as independent as possible.  How will a doctor assess your ADLs?  Asking about ADLs is part of a routine health checkup your doctor will likely do as you age. Your health check might be done in a doctor's office, in your home, or at a hospital. The goal is to find out if you are having any problems that could make it hard to care for yourself or that make it unsafe for you to be on your own.  To measure your ADLs, your doctor will ask how hard it is for you to do routine tasks. Your doctor may also want to know if you have changed the way you do a task because of a health problem. Your doctor may watch how you:  Walk back and forth.  Keep your balance while you stand or walk.  Move from sitting to standing or from a bed to a chair.  Button or unbutton a shirt or sweater.  Remove and put on your shoes.  It's common to feel a little worried or anxious if you find you can't do all the things you used to be able to do. Talking with your doctor about ADLs is a way to make sure you're as safe as possible and able to care for yourself as well as you can. You may want to bring a caregiver, friend, or family member to your checkup. They can help you talk to your doctor.  Follow-up care is a key part of your treatment and safety. Be sure to make and go to all appointments, and call your doctor if you are having problems. It's also a good idea to know your test results and keep a list of the medicines you take.  Current as of: October 24, 2023  Content Version: 14.2 2024 [facility]  [employer].   Care instructions adapted under license by your healthcare professional. If you have questions about a medical condition or this instruction, always ask your healthcare professional. [employer] disclaims any warranty or liability for your use of this information.    Preventing Falls: Care Instructions  Injuries and health problems such as trouble walking or poor eyesight can increase your risk of falling. So can some medicines. But there are things you can do to help prevent falls. You can exercise to get stronger. You can also arrange your home to make it safer.    Talk to your doctor about the medicines you take. Ask if any of them increase the risk of falls and whether they can be changed or stopped.   Try to exercise regularly. It can help improve your strength and balance. This can help lower your risk of falling.         Practice fall safety and prevention.   Wear low-heeled shoes that fit well and give your feet good support. Talk to your doctor if you have foot problems that make this hard.  Carry a cellphone or wear a medical alert device that you can use to call for help.  Use stepladders instead of chairs to reach high objects. Don't climb if you're at risk for falls. Ask for help, if needed.  Wear the correct eyeglasses, if you need them.        Make your home safer.   Remove rugs, cords, clutter, and furniture from walkways.  Keep your house well lit. Use night-lights in hallways and bathrooms.  Install and use sturdy handrails on stairways.  Wear nonskid footwear, even inside. Don't walk barefoot or in socks without shoes.        Be safe outside.   Use handrails, curb cuts, and ramps whenever possible.  Keep your hands free by using a shoulder bag or backpack.  Try to walk in well-lit areas. Watch out for uneven ground, changes in pavement, and debris.  Be careful in the winter. Walk on the grass or gravel when sidewalks are slippery. Use de-icer on steps and walkways.  "Add non-slip devices to shoes.    Put grab bars and nonskid mats in your shower or tub and near the toilet. Try to use a shower chair or bath bench when bathing.   Get into a tub or shower by putting in your weaker leg first. Get out with your strong side first. Have a phone or medical alert device in the bathroom with you.   Where can you learn more?  Go to https://www.[employer].net/patiented  Enter G117 in the search box to learn more about \"Preventing Falls: Care Instructions.\"  Current as of: July 17, 2023  Content Version: 14.2 2024 [employer].   Care instructions adapted under license by your healthcare professional. If you have questions about a medical condition or this instruction, always ask your healthcare professional. Health[employer] Inc[employer] disclaims any warranty or liability for your use of this information.    Learning About Sleeping Well  What does sleeping well mean?     Sleeping well means getting enough sleep to feel good and stay healthy. How much sleep is enough varies among people.  The number of hours you sleep and how you feel when you wake up are both important. If you do not feel refreshed, you probably need more sleep. Another sign of not getting enough sleep is feeling tired during the day.  Experts recommend that adults get at least 7 or more hours of sleep per day. Children and older adults need more sleep.  Why is getting enough sleep important?  Getting enough quality sleep is a basic part of good health. When your sleep suffers, your physical health, mood, and your thoughts can suffer too. You may find yourself feeling more grumpy or stressed. Not getting enough sleep also can lead to serious problems, including injury, accidents, anxiety, and depression.  What might cause poor sleeping?  Many things can cause sleep problems, including:  Changes to your sleep schedule.  Stress. Stress can be caused by fear about a single event, such as giving a speech. Or you may " "have ongoing stress, such as worry about work or school.  Depression, anxiety, and other mental or emotional conditions.  Changes in your sleep habits or surroundings. This includes changes that happen where you sleep, such as noise, light, or sleeping in a different bed. It also includes changes in your sleep pattern, such as having jet lag or working a late shift.  Health problems, such as pain, breathing problems, and restless legs syndrome.  Lack of regular exercise.  Using alcohol, nicotine, or caffeine before bed.  How can you help yourself?  Here are some tips that may help you sleep more soundly and wake up feeling more refreshed.  Your sleeping area   Use your bedroom only for sleeping and sex. A bit of light reading may help you fall asleep. But if it doesn't, do your reading elsewhere in the house. Try not to use your TV, computer, smartphone, or tablet while you are in bed.  Be sure your bed is big enough to stretch out comfortably, especially if you have a sleep partner.  Keep your bedroom quiet, dark, and cool. Use curtains, blinds, or a sleep mask to block out light. To block out noise, use earplugs, soothing music, or a \"white noise\" machine.  Your evening and bedtime routine   Create a relaxing bedtime routine. You might want to take a warm shower or bath, or listen to soothing music.  Go to bed at the same time every night. And get up at the same time every morning, even if you feel tired.  What to avoid   Limit caffeine (coffee, tea, caffeinated sodas) during the day, and don't have any for at least 6 hours before bedtime.  Avoid drinking alcohol before bedtime. Alcohol can cause you to wake up more often during the night.  Try not to smoke or use tobacco, especially in the evening. Nicotine can keep you awake.  Limit naps during the day, especially close to bedtime.  Avoid lying in bed awake for too long. If you can't fall asleep or if you wake up in the middle of the night and can't get back to " "sleep within about 20 minutes, get out of bed and go to another room until you feel sleepy.  Avoid taking medicine right before bed that may keep you awake or make you feel hyper or energized. Your doctor can tell you if your medicine may do this and if you can take it earlier in the day.  If you can't sleep   Imagine yourself in a peaceful, pleasant scene. Focus on the details and feelings of being in a place that is relaxing.  Get up and do a quiet or boring activity until you feel sleepy.  Avoid drinking any liquids before going to bed to help prevent waking up often to use the bathroom.  Where can you learn more?  Go to https://www.[employer].net/patiented  Enter J942 in the search box to learn more about \"Learning About Sleeping Well.\"  Current as of: July 10, 2023  Content Version: 14.2 2024 [facility] [employer].   Care instructions adapted under license by your healthcare professional. If you have questions about a medical condition or this instruction, always ask your healthcare professional. Healthwise, Incorporated disclaims any warranty or liability for your use of this information.       "

## 2024-12-12 NOTE — PROGRESS NOTES
Preventive Care Visit  [facility]  Magalis Tucker PA-C, Family Medicine  Dec 12, 2024      Assessment & Plan   1. Encounter for Medicare annual wellness exam (Primary)  Health maintenance discussed with patient is appropriate for age and risk factors.    2. Essential Hypertension  Chronic, well-controlled.  Screening labs ordered and I will follow-up with results.  Continue lisinopril metoprolol.  Follows with cardiology.  - Lipid Profile  - Basic metabolic panel    3. Type 2 diabetes mellitus with chronic kidney disease, with long-term current use of insulin, unspecified CKD stage (H)  Chronic, well-controlled.  A1c at goal of less than 7.  Continue with metformin 500 mg twice daily, Actos 30 mg daily, 20 units Lantus insulin daily, and then mealtime insulin.  Check kidney function and medicine doses.  He did try Ozempic in the past but had dizziness and other side effects.  Follow-up for diabetes recheck in 6 months.  Continue using CGM as well.  A1c: 6.5%  Eye Exam: She reports up-to-date, will get a record  Foot Exam: Done today, normal  Smoking: No  On a statin: Yes  Blood Pressure: Well-controlled  Microalbumin: Ordered today  On ACE inhibitor: Yes  - Hemoglobin A1c  - Albumin Random Urine Quantitative with Creat Ratio  - Basic metabolic panel  - Vitamin B12; Future  - Vitamin B12  - FOOT EXAM    4. Renal Insufficiency  Chronic, stable.  We have been monitoring his kidney function about every 6 months.  Appears to have a baseline of a creatinine around 1.5 and GFR around 45.  Will recheck today and I will follow-up with results.  Can consider nephrology consult at any time.  - Basic metabolic panel    5. Pure hypercholesterolemia  Chronic, well-controlled.  Screening labs ordered and I will follow-up with results.  Continue atorvastatin.  - Lipid Profile    6. Anemia, unspecified type  Chronic, stable.  Has had mildly low hemoglobin for some time.  Recheck today was relatively  "stable.  He is generally asymptomatic.  Continue to monitor.  Could consider heme consult.  - CBC with platelets; Future  - CBC with platelets    7. Thrombocytopenia (H)  Chronic, stable.  Mildly low platelet count for the past year or so.  He is asymptomatic.  Recheck today, normal looks good.  Continue to monitor.    Consider Hematology consult.  - CBC with platelets; Future  - CBC with platelets              BMI  Estimated body mass index is 35.18 kg/m  as calculated from the following:    Height as of this encounter: 1.88 m (6' 2\").    Weight as of this encounter: 124.3 kg (274 lb).   Weight management plan: Discussed healthy diet and exercise guidelines    Counseling  Appropriate preventive services were addressed with this patient via screening, questionnaire, or discussion as appropriate for fall prevention, nutrition, physical activity, Tobacco-use cessation, social engagement, weight loss and cognition.  Checklist reviewing preventive services available has been given to the patient.  Reviewed patient's diet, addressing concerns and/or questions.   Discussed possible causes of fatigue. Patient reported safety concerns were addressed today.          Lillie Nance is a 79 year old, presenting for the following:  Wellness Visit        12/12/2024     3:26 PM   Additional Questions   Roomed by hser   Accompanied by wife     Via the Health Maintenance questionnaire, the patient has reported the following services have been completed -Eye Exam: [facility] eye [facility] 2024-10-08, this information has been sent to the abstraction team.    HPI    Generally doing well.  Here with his wife today.  Still enjoys riding his motorcycle.  Notes that he needs to improve his exercise.  Sometimes has trouble with his tamsulosin.  He feels like if he takes it every day makes him too dizzy.  Right now he is taking it every other day.  He feels like on days when he takes it urination is more difficult, days when he does not take " it urination is better.    He does have healthcare directive at home.    Health Care Directive  Patient does not have a Health Care Directive: Patient states has Advance Directive and will bring in a copy to clinic.      12/7/2024   General Health   How would you rate your overall physical health? Good   Feel stress (tense, anxious, or unable to sleep) Not at all            12/7/2024   Nutrition   Diet: Regular (no restrictions)            12/7/2024   Exercise   Days per week of moderate/strenous exercise 0 days   Average minutes spent exercising at this level 0 min      (!) EXERCISE CONCERN      12/7/2024   Social Factors   Frequency of gathering with friends or relatives More than three times a week   Worry food won't last until get money to buy more No   Food not last or not have enough money for food? No   Do you have housing? (Housing is defined as stable permanent housing and does not include staying ouside in a car, in a tent, in an abandoned building, in an overnight shelter, or couch-surfing.) Yes   Are you worried about losing your housing? No   Lack of transportation? No   Unable to get utilities (heat,electricity)? No            12/12/2024   Fall Risk   Gait Speed Test (Document in seconds) 5.06   Gait Speed Test Interpretation Greater than 5.01 seconds - ABNORMAL             12/7/2024   Activities of Daily Living- Home Safety   Needs help with the following daily activites None of the above   Safety concerns in the home No grab bars in the bathroom            12/7/2024   Dental   Dentist two times every year? Yes            12/7/2024   Hearing Screening   Hearing concerns? None of the above            12/7/2024   Driving Risk Screening   Patient/family members have concerns about driving No            12/7/2024   General Alertness/Fatigue Screening   Have you been more tired than usual lately? (!) YES            12/7/2024   Urinary Incontinence Screening   Bothered by leaking urine in past 6 months No                Today's PHQ-2 Score:       12/12/2024     3:07 PM   PHQ-2 ( 1999 Pf[employer])   PHQ-2 Score Incomplete           12/7/2024   Substance Use   Alcohol more than 3/day or more than 7/wk No   Do you have a current opioid prescription? No   How severe/bad is pain from 1 to 10? 0/10 (No Pain)   Do you use any other substances recreationally? No        Social History     Tobacco Use    Smoking status: Former    Smokeless tobacco: Former     Types: Chew     Quit date: 1/1/2016   Substance Use Topics    Alcohol use: Yes     Alcohol/week: 7.0 standard drinks of alcohol     Comment: Alcoholic Drinks/day: once a week    Drug use: No       ASCVD Risk   The ASCVD Risk score (Hernandez DK, et al., 2019) failed to calculate for the following reasons:    Risk score cannot be calculated because patient has a medical history suggesting prior/existing ASCVD          Reviewed and updated as needed this visit by Provider                      Current providers sharing in care for this patient include:  Patient Care Team:  Magalis Tucker PA-C as PCP - General  Magalis Tucker PA-C as Assigned PCP  Jake Ortega, PharmD as Pharmacist (Pharmacist)  Chris Denny DO as MD (Neurology)  Jake Ortega PharmD as Assigned MTM Pharmacist  Chris Denny DO as Assigned Neuroscience Provider    The following health maintenance items are reviewed in Epic and correct as of today:  Health Maintenance   Topic Date Due    LIPID  11/16/2023    EYE EXAM  10/02/2024    MICROALBUMIN  12/05/2024    DIABETIC FOOT EXAM  12/05/2024    BMP  06/06/2025    A1C  06/12/2025    MEDICARE ANNUAL WELLNESS VISIT  12/12/2025    ANNUAL REVIEW OF HM ORDERS  12/12/2025    FALL RISK ASSESSMENT  12/12/2025    ADVANCE CARE PLANNING  12/23/2028    DTAP/TDAP/TD IMMUNIZATION (3 - Td or Tdap) 04/26/2029    HEPATITIS C SCREENING  Completed    PHQ-2 (once per calendar year)  Completed    INFLUENZA VACCINE  Completed     "Pneumococcal Vaccine: 65+ Years  Completed    ZOSTER IMMUNIZATION  Completed    RSV VACCINE  Completed    COVID-19 Vaccine  Completed    HPV IMMUNIZATION  Aged Out    MENINGITIS IMMUNIZATION  Aged Out    RSV MONOCLONAL ANTIBODY  Aged Out    COLORECTAL CANCER SCREENING  Discontinued    LUNG CANCER SCREENING  Discontinued          Objective    Exam  /60 (BP Location: Left arm, Patient Position: Sitting, Cuff Size: Adult Large)   Pulse 73   Temp 97.6  F (36.4  C) (Temporal)   Resp 16   Ht 1.88 m (6' 2\")   Wt 124.3 kg (274 lb)   SpO2 96%   BMI 35.18 kg/m     Estimated body mass index is 35.18 kg/m  as calculated from the following:    Height as of this encounter: 1.88 m (6' 2\").    Weight as of this encounter: 124.3 kg (274 lb).    Physical Exam  GENERAL: alert and no distress  EYES: Eyes grossly normal to inspection, PERRL and conjunctivae and sclerae normal  HENT: ear canals and TM's normal, nose and mouth without ulcers or lesions  NECK: no adenopathy, no asymmetry, masses, or scars  RESP: lungs clear to auscultation - no rales, rhonchi or wheezes  CV: regular rate and rhythm, normal S1 S2, no S3 or S4, no murmur, click or rub, no peripheral edema  ABDOMEN: soft, nontender, no hepatosplenomegaly, no masses and bowel sounds normal  MS: no gross musculoskeletal defects noted, no edema  SKIN: no suspicious lesions or rashes  NEURO: Normal strength and tone, mentation intact and speech normal  PSYCH: mentation appears normal, affect normal/bright        12/12/2024   Mini Cog   Clock Draw Score 2 Normal   3 Item Recall 3 objects recalled   Mini Cog Total Score 5                 Signed Electronically by: Magalis Tucker PA-C      Prior to immunization administration, verified patients identity using patient s name and date of birth. Please see Immunization Activity for additional information.     Screening Questionnaire for Adult Immunization    Are you sick today?   No   Do you have allergies to " medications, food, a vaccine component or latex?   No   Have you ever had a serious reaction after receiving a vaccination?   No   Do you have a long-term health problem with heart, lung, kidney, or metabolic disease (e.g., diabetes), asthma, a blood disorder, no spleen, complement component deficiency, a cochlear implant, or a spinal fluid leak?  Are you on long-term aspirin therapy?   Yes   Do you have cancer, leukemia, HIV/AIDS, or any other immune system problem?   No   Do you have a parent, brother, or sister with an immune system problem?   No   In the past 3 months, have you taken medications that affect  your immune system, such as prednisone, other steroids, or anticancer drugs; drugs for the treatment of rheumatoid arthritis, Crohn s disease, or psoriasis; or have you had radiation treatments?   No   Have you had a seizure, or a brain or other nervous system problem?   No   During the past year, have you received a transfusion of blood or blood    products, or been given immune (gamma) globulin or antiviral drug?   No   For women: Are you pregnant or is there a chance you could become       pregnant during the next month?   No   Have you received any vaccinations in the past 4 weeks?   Yes     Immunization questionnaire was positive for at least one answer.  Notified provider.      Patient instructed to remain in clinic for 15 minutes afterwards, and to report any adverse reactions.     Screening performed by Estrada Wolf MA on 12/12/2024 at 3:31 PM.

## 2024-12-13 ENCOUNTER — TELEPHONE (OUTPATIENT)
Dept: FAMILY MEDICINE | Facility: CLINIC | Age: 79
End: 2024-12-13
Payer: MEDICARE

## 2024-12-13 LAB
ANION GAP SERPL CALCULATED.3IONS-SCNC: 11 MMOL/L (ref 7–15)
BUN SERPL-MCNC: 21.5 MG/DL (ref 8–23)
CALCIUM SERPL-MCNC: 9.3 MG/DL (ref 8.8–10.4)
CHLORIDE SERPL-SCNC: 104 MMOL/L (ref 98–107)
CHOLEST SERPL-MCNC: 87 MG/DL
CREAT SERPL-MCNC: 1.75 MG/DL (ref 0.67–1.17)
CREAT UR-MCNC: 164 MG/DL
EGFRCR SERPLBLD CKD-EPI 2021: 39 ML/MIN/1.73M2
FASTING STATUS PATIENT QL REPORTED: ABNORMAL
FASTING STATUS PATIENT QL REPORTED: ABNORMAL
GLUCOSE SERPL-MCNC: 122 MG/DL (ref 70–99)
HCO3 SERPL-SCNC: 24 MMOL/L (ref 22–29)
HDLC SERPL-MCNC: 38 MG/DL
LDLC SERPL CALC-MCNC: 25 MG/DL
MICROALBUMIN UR-MCNC: 18 MG/L
MICROALBUMIN/CREAT UR: 10.98 MG/G CR (ref 0–17)
NONHDLC SERPL-MCNC: 49 MG/DL
POTASSIUM SERPL-SCNC: 4.3 MMOL/L (ref 3.4–5.3)
SODIUM SERPL-SCNC: 139 MMOL/L (ref 135–145)
TRIGL SERPL-MCNC: 119 MG/DL
VIT B12 SERPL-MCNC: 571 PG/ML (ref 232–1245)

## 2024-12-13 NOTE — TELEPHONE ENCOUNTER
This message is to inform you that we are in need of Medicare compliant prescriptions for Freestyle Rowan 14 Day Sensor.     Prescription must be written by: EVELIA Tucker  Must include full supply name: Freestyle Rowan 14 Day Sensor  Quantity: 6   Refills: 1   SIG: Change every 14 days    As a reminder, Medicare requires patients to be seen every 3 months for insulin supplies and every 6 months for CGMs. The provider who sees the patient must be the provider on the prescription, must be written on the day of or after office visit date and office visit must include notes about diabetes and insulin regimen. The Diabetes Care Services team at [facility] may request new prescriptions before renewal dates of the prescription is filled over the allowable amount. Writing the order to match what is above will help ensure we will only need to request every 3 or 6 months.    Thank you!    [facility]  Diabetes Care Services Team  [address] [place] [address]  Provider Phone: [phone]  Patient Line: [phone]  Fax: [phone]  E-mail: [email or web address]

## 2025-01-27 DIAGNOSIS — E11.9 TYPE 2 DIABETES MELLITUS WITHOUT COMPLICATION, WITHOUT LONG-TERM CURRENT USE OF INSULIN (H): ICD-10-CM

## 2025-01-27 RX ORDER — PEN NEEDLE, DIABETIC 32GX 5/32"
NEEDLE, DISPOSABLE MISCELLANEOUS
Qty: 300 EACH | Refills: 0 | Status: SHIPPED | OUTPATIENT
Start: 2025-01-27

## 2025-02-15 DIAGNOSIS — E11.22 TYPE 2 DIABETES MELLITUS WITH CHRONIC KIDNEY DISEASE, WITH LONG-TERM CURRENT USE OF INSULIN, UNSPECIFIED CKD STAGE (H): ICD-10-CM

## 2025-02-15 DIAGNOSIS — Z79.4 TYPE 2 DIABETES MELLITUS WITH CHRONIC KIDNEY DISEASE, WITH LONG-TERM CURRENT USE OF INSULIN, UNSPECIFIED CKD STAGE (H): ICD-10-CM

## 2025-02-15 RX ORDER — INSULIN GLARGINE 300 U/ML
20 INJECTION, SOLUTION SUBCUTANEOUS AT BEDTIME
Qty: 6 ML | Refills: 0 | Status: SHIPPED | OUTPATIENT
Start: 2025-02-15

## 2025-02-26 DIAGNOSIS — N18.32 STAGE 3B CHRONIC KIDNEY DISEASE (H): Primary | ICD-10-CM

## 2025-03-13 NOTE — PROGRESS NOTES
Spoke to pt wife. She stated that she contacted kidney specialist and was told that they are working on referrals and will reach out to pt to schedule appointment next week. Will follow up with pt end of next week to see if pt got scheduled.

## 2025-03-19 ENCOUNTER — TELEPHONE (OUTPATIENT)
Dept: FAMILY MEDICINE | Facility: CLINIC | Age: 80
End: 2025-03-19
Payer: MEDICARE

## 2025-03-19 NOTE — TELEPHONE ENCOUNTER
Forms/Letter Request    Type of form/letter: DME (wheelchair, hospital bed)    Type of DME requested: c pap supplies    Do we have the form/letter: Yes: PCP in basket    Who is the form from? [employer] (if other please explain)    Where did/will the form come from? form was faxed in    When is form/letter needed by: ASAP    How would you like the form/letter returned: Fax : [phone]    Patient Notified form requests are processed in 5-7 business days:No    Could we send this information to you in [facility] or would you prefer to receive a phone call?:   Patient would prefer a phone call   Okay to leave a detailed message?: Yes at Home number on file [phone] (home)

## 2025-03-20 NOTE — TELEPHONE ENCOUNTER
I do not know what specifications he needs for his CPAP supplies.  This needs to be done by his sleep specialist.  Blank form in my out box.

## 2025-04-01 DIAGNOSIS — E11.9 TYPE 2 DIABETES MELLITUS WITHOUT COMPLICATION, WITHOUT LONG-TERM CURRENT USE OF INSULIN (H): ICD-10-CM

## 2025-04-01 RX ORDER — PEN NEEDLE, DIABETIC 32GX 5/32"
NEEDLE, DISPOSABLE MISCELLANEOUS
Qty: 300 EACH | Refills: 1 | Status: SHIPPED | OUTPATIENT
Start: 2025-04-01

## 2025-04-02 ENCOUNTER — TRANSFERRED RECORDS (OUTPATIENT)
Dept: HEALTH INFORMATION MANAGEMENT | Facility: CLINIC | Age: 80
End: 2025-04-02
Payer: MEDICARE

## 2025-04-27 ENCOUNTER — HEALTH MAINTENANCE LETTER (OUTPATIENT)
Age: 80
End: 2025-04-27

## 2025-05-19 DIAGNOSIS — E11.22 TYPE 2 DIABETES MELLITUS WITH CHRONIC KIDNEY DISEASE, WITH LONG-TERM CURRENT USE OF INSULIN, UNSPECIFIED CKD STAGE (H): ICD-10-CM

## 2025-05-19 DIAGNOSIS — Z79.4 TYPE 2 DIABETES MELLITUS WITH CHRONIC KIDNEY DISEASE, WITH LONG-TERM CURRENT USE OF INSULIN, UNSPECIFIED CKD STAGE (H): ICD-10-CM

## 2025-05-19 RX ORDER — INSULIN GLARGINE 300 U/ML
20 INJECTION, SOLUTION SUBCUTANEOUS AT BEDTIME
Qty: 6 ML | Refills: 0 | Status: SHIPPED | OUTPATIENT
Start: 2025-05-19

## 2025-05-20 DIAGNOSIS — E11.22 TYPE 2 DIABETES MELLITUS WITH CHRONIC KIDNEY DISEASE, WITH LONG-TERM CURRENT USE OF INSULIN, UNSPECIFIED CKD STAGE (H): ICD-10-CM

## 2025-05-20 DIAGNOSIS — Z79.4 TYPE 2 DIABETES MELLITUS WITH CHRONIC KIDNEY DISEASE, WITH LONG-TERM CURRENT USE OF INSULIN, UNSPECIFIED CKD STAGE (H): ICD-10-CM

## 2025-05-20 RX ORDER — INSULIN LISPRO 100 [IU]/ML
INJECTION, SOLUTION INTRAVENOUS; SUBCUTANEOUS
Qty: 15 ML | Refills: 1 | Status: SHIPPED | OUTPATIENT
Start: 2025-05-20

## 2025-05-29 ENCOUNTER — MEDICAL CORRESPONDENCE (OUTPATIENT)
Dept: HEALTH INFORMATION MANAGEMENT | Facility: CLINIC | Age: 80
End: 2025-05-29
Payer: MEDICARE

## 2025-05-29 ENCOUNTER — TELEPHONE (OUTPATIENT)
Dept: FAMILY MEDICINE | Facility: CLINIC | Age: 80
End: 2025-05-29
Payer: MEDICARE

## 2025-05-29 NOTE — TELEPHONE ENCOUNTER
Forms/Letter Request    Type of form/letter: DME (wheelchair, hospital bed)    Type of DME requested: CPAP MACHINE    Do we have the form/letter: Yes: PLACED FORM IN EAST NTBS FOLDER, Patient is scheduled to be seen on 6/17/2025 gee/Magalis.     Who is the form from? [employer] [place] (if other please explain)    Where did/will the form come from? form was faxed in    When is form/letter needed by: ASAP    How would you like the form/letter returned: Fax : [phone]    Patient Notified form requests are processed in 5-7 business days:No    Could we send this information to you in [facility] or would you prefer to receive a phone call?:   Patient would prefer a phone call   Okay to leave a detailed message?: Yes at Cell number on file:    Telephone Information:   Mobile [phone]

## 2025-06-17 ENCOUNTER — RESULTS FOLLOW-UP (OUTPATIENT)
Dept: FAMILY MEDICINE | Facility: CLINIC | Age: 80
End: 2025-06-17

## 2025-06-17 ENCOUNTER — OFFICE VISIT (OUTPATIENT)
Dept: FAMILY MEDICINE | Facility: CLINIC | Age: 80
End: 2025-06-17
Payer: MEDICARE

## 2025-06-17 VITALS
RESPIRATION RATE: 18 BRPM | OXYGEN SATURATION: 97 % | TEMPERATURE: 97.5 F | HEART RATE: 73 BPM | SYSTOLIC BLOOD PRESSURE: 126 MMHG | HEIGHT: 74 IN | BODY MASS INDEX: 35.04 KG/M2 | WEIGHT: 273 LBS | DIASTOLIC BLOOD PRESSURE: 67 MMHG

## 2025-06-17 DIAGNOSIS — N18.32 STAGE 3B CHRONIC KIDNEY DISEASE (H): ICD-10-CM

## 2025-06-17 DIAGNOSIS — Z79.4 TYPE 2 DIABETES MELLITUS WITH CHRONIC KIDNEY DISEASE, WITH LONG-TERM CURRENT USE OF INSULIN, UNSPECIFIED CKD STAGE (H): Primary | ICD-10-CM

## 2025-06-17 DIAGNOSIS — Z00.00 HEALTHCARE MAINTENANCE: ICD-10-CM

## 2025-06-17 DIAGNOSIS — E11.22 TYPE 2 DIABETES MELLITUS WITH CHRONIC KIDNEY DISEASE, WITH LONG-TERM CURRENT USE OF INSULIN, UNSPECIFIED CKD STAGE (H): Primary | ICD-10-CM

## 2025-06-17 LAB
EST. AVERAGE GLUCOSE BLD GHB EST-MCNC: 134 MG/DL
HBA1C MFR BLD: 6.3 % (ref 0–5.6)
MEV IGG SER IA-ACNC: >300 AU/ML
MEV IGG SER IA-ACNC: POSITIVE

## 2025-06-17 PROCEDURE — G2211 COMPLEX E/M VISIT ADD ON: HCPCS | Performed by: PHYSICIAN ASSISTANT

## 2025-06-17 PROCEDURE — 3044F HG A1C LEVEL LT 7.0%: CPT | Performed by: PHYSICIAN ASSISTANT

## 2025-06-17 PROCEDURE — 3078F DIAST BP <80 MM HG: CPT | Performed by: PHYSICIAN ASSISTANT

## 2025-06-17 PROCEDURE — 83036 HEMOGLOBIN GLYCOSYLATED A1C: CPT | Performed by: PHYSICIAN ASSISTANT

## 2025-06-17 PROCEDURE — 99214 OFFICE O/P EST MOD 30 MIN: CPT | Performed by: PHYSICIAN ASSISTANT

## 2025-06-17 PROCEDURE — 3074F SYST BP LT 130 MM HG: CPT | Performed by: PHYSICIAN ASSISTANT

## 2025-06-17 PROCEDURE — 36415 COLL VENOUS BLD VENIPUNCTURE: CPT | Performed by: PHYSICIAN ASSISTANT

## 2025-06-17 PROCEDURE — 86765 RUBEOLA ANTIBODY: CPT | Performed by: PHYSICIAN ASSISTANT

## 2025-06-17 RX ORDER — EMPAGLIFLOZIN 10 MG/1
10 TABLET, FILM COATED ORAL DAILY
COMMUNITY
Start: 2025-04-21

## 2025-06-17 NOTE — PROGRESS NOTES
Subjective:    Goyo Cole is a 79 year old male who presents with chief complaint of BP check.  He is taking his medications list directly.  Nightly and mealtime insulin, [place], Actos, Jardiance.  Jardiance was recently started by nephrology.  Patient feels that ever since he started the Jardiance, he has had some dizzy spells.  Dizzy spells are interfering with him riding his motorcycle, which she loves to do.    He has also unfortunately had some dental issues recently and had to have a tooth pulled.    For CPAP and sleep apnea, he sees Dr. Anoop Purvis at [facility]  # [phone]    Patient Active Problem List   Diagnosis    Fatigue    Pure hypercholesterolemia    Essential Hypertension    Renal Insufficiency    Proteinuria    Male Erectile Disorder    Esophageal Reflux    Degenerative joint disease (DJD) of hip    ELIOT on CPAP    Benign prostatic hyperplasia with lower urinary tract symptoms    Left Spermatocele    Class 1 obesity due to excess calories with serious comorbidity and body mass index (BMI) of 32.0 to 32.9 in adult    Type 2 diabetes mellitus with kidney complication, with long-term current use of insulin (H)    Stented coronary artery    Mitral valve stenosis    Atherosclerosis of native coronary artery of native heart without angina pectoris    Benign neoplasm of ascending colon    Urine frequency    Kidney stone    Cardiac pacemaker in situ    Thrombocytopenia    Anemia, unspecified type    Stage 3b chronic kidney disease (H)       Current Outpatient Medications:     acetaminophen (TYLENOL) 500 MG tablet, Take 1,000 mg by mouth every 6 hours , Disp: , Rfl:     aspirin 81 MG EC tablet, [ASPIRIN 81 MG EC TABLET] Take 81 mg by mouth daily., Disp: , Rfl:     atorvastatin (LIPITOR) 40 MG tablet, TAKE 1 TABLET BY MOUTH EVERY NIGHT AT BEDTIME, Disp: 90 tablet, Rfl: 2    BD EMMETT U/F 32G X 4 MM insulin pen needle, USE AS DIRECTED, Disp: 300 each, Rfl: 1    calcium citrate-vitamin D  (CITRACAL) 315-200 MG-UNIT TABS per tablet, Take 2 tablets by mouth 2 times daily, Disp: , Rfl:     cholecalciferol, vitamin D3, (CHOLECALCIFEROL) 1,000 unit tablet, Take 2,000 Units by mouth daily, Disp: , Rfl:     Continuous Blood Gluc  (FREESTYLE ESTRADA 14 DAY READER) LAURENCE, 1 Application every 14 days, Disp: 2 each, Rfl: 11    Continuous Glucose Sensor (FREESTYLE ESTRADA 14 DAY SENSOR) MIS, 1 each every 14 days. .  Change sensor every 14 days., Disp: 6 each, Rfl: 1    CONTOUR NEXT TEST STRIPS strips, [CONTOUR NEXT TEST STRIPS STRIPS] CHECK BLOOD SUGAR BEFORE MEALS AND AT BEDTIME, Disp: 120 strip, Rfl: 10    HUMALOG KWIKPEN 100 UNIT/ML soln, INJECT 7-10 UNITS SUBCUTANEOUSLY 3 TIMES DAILY (BEFORE MEALS), Disp: 15 mL, Rfl: 1    JARDIANCE 10 MG TABS tablet, Take 10 mg by mouth daily., Disp: , Rfl:     lisinopril (ZESTRIL) 10 MG tablet, Take 10 mg by mouth daily, Disp: , Rfl:     metFORMIN (GLUCOPHAGE) 500 MG tablet, TAKE 1 TABLET (500 MG) BY MOUTH 2 TIMES DAILY (WITH MEALS), Disp: 180 tablet, Rfl: 3    Metoprolol Succinate (KAPSPARGO) 25 MG CS24, Take 12.5 mg by mouth daily, Disp: , Rfl:     MICROLET LANCET, [MICROLET LANCET] USE TO CHECK BLOOD SUGAR QID as needed, Disp: 100 each, Rfl: 3    miscellaneous medical supply (BLOOD PRESSURE CUFF) [facility], [MISCELLANEOUS MEDICAL SUPPLY (BLOOD PRESSURE CUFF) MISC] Please provide patient with automatic blood pressure cuff.  Diagnosis: hypertension, Disp: 1 each, Rfl: 0    omeprazole 20 MG tablet, Take 20 mg by mouth daily, Disp: , Rfl:     pioglitazone (ACTOS) 30 MG tablet, TAKE 1 TABLET (30 MG) BY MOUTH DAILY, Disp: 90 tablet, Rfl: 3    TOUJEO MAX SOLOSTAR 300 UNIT/ML (2 units dial) pen, INJECT 20 UNITS SUBCUTANEOUS AT BEDTIME, Disp: 6 mL, Rfl: 0    tamsulosin (FLOMAX) 0.4 MG capsule, Take 0.4 mg by mouth daily (Patient not taking: Reported on 6/17/2025), Disp: , Rfl:       Objective:   Allergies:  Ketamine    Vitals:  Vitals:    06/17/25 1248   BP: 126/67   BP Location:  "Right arm   Patient Position: Sitting   Cuff Size: Adult Large   Pulse: 73   Resp: 18   Temp: 97.5  F (36.4  C)   TempSrc: Temporal   SpO2: 97%   Weight: 123.8 kg (273 lb)   Height: 1.88 m (6' 2\")       Body mass index is 35.05 kg/m .    Vital signs reviewed.  General: Patient is alert and oriented x 3, in no apparent distress  Cardiac: regular rate and rhythm, no murmurs  Pulmonary: lungs clear to auscultation bilaterally, no crackles, rales, rhonchi, or wheezing noted    Results for orders placed or performed in visit on 06/17/25   Hemoglobin A1c     Status: Abnormal   Result Value Ref Range    Estimated Average Glucose 134 (H) <117 mg/dL    Hemoglobin A1C 6.3 (H) 0.0 - 5.6 %         Assessment and Plan:   1. Type 2 diabetes mellitus with chronic kidney disease, with long-term current use of insulin, unspecified CKD stage (H) (Primary)  Chronic, well-controlled.  A1c is at goal of less than 7.  Continue same medications, insulin (nightly and mealtime) metformin, Actos, Jardiance.  Of note, Jardiance was recently started by nephrology.  They will follow-up with him on this medication.  Patient thinks that Jardiance has started to make him a little more dizzy.  He will review this with the nephrologist.  See HPI.  Next diabetes check in 6 months.  A1c: 6.3%  Eye Exam: Up-to-date  Foot Exam: Up-to-date  Smoking: No  On a statin: yes  Blood Pressure: Well-controlled  Microalbumin: Done today  On ACE inhibitor: Yes  - Hemoglobin A1c    2. Stage 3b chronic kidney disease (H)  Chronic, stable.  He is following with nephrology.  Has appointment with them next week.  They had labs they wanted ordered today and lab completed those.  They are specifically watching kidney function since he started Jardiance.    3. Healthcare maintenance  Given recent local measles outbreak, will check for immunity.  - Rubeola Antibody IgG; Future  - Rubeola Antibody IgG       This dictation uses voice recognition software, which may contain " typographical errors.

## 2025-06-17 NOTE — PROGRESS NOTES
Prior to immunization administration, verified patients identity using patient s name and date of birth. Please see Immunization Activity for additional information.     Screening Questionnaire for Adult Immunization    Are you sick today?   No   Do you have allergies to medications, food, a vaccine component or latex?   No   Have you ever had a serious reaction after receiving a vaccination?   No   Do you have a long-term health problem with heart, lung, kidney, or metabolic disease (e.g., diabetes), asthma, a blood disorder, no spleen, complement component deficiency, a cochlear implant, or a spinal fluid leak?  Are you on long-term aspirin therapy?   Yes   Do you have cancer, leukemia, HIV/AIDS, or any other immune system problem?   No   Do you have a parent, brother, or sister with an immune system problem?   Don't Know   In the past 3 months, have you taken medications that affect  your immune system, such as prednisone, other steroids, or anticancer drugs; drugs for the treatment of rheumatoid arthritis, Crohn s disease, or psoriasis; or have you had radiation treatments?   No   Have you had a seizure, or a brain or other nervous system problem?   No   During the past year, have you received a transfusion of blood or blood    products, or been given immune (gamma) globulin or antiviral drug?   No   For women: Are you pregnant or is there a chance you could become       pregnant during the next month?   No   Have you received any vaccinations in the past 4 weeks?   No     Immunization questionnaire was positive for at least one answer.  Notified Magalis Tucker.      Patient instructed to remain in clinic for 15 minutes afterwards, and to report any adverse reactions.     Screening performed by Christa Alexander CMA on 6/17/2025 at 12:49 PM.        Answers submitted by the patient for this visit:  General Questionnaire (Submitted on 6/16/2025)  Chief Complaint: Chronic problems general questions HPI  Form  What is the reason for your visit today? : Apnea?  How many servings of fruits and vegetables do you eat daily?: 0-1  On average, how many sweetened beverages do you drink each day (Examples: soda, juice, sweet tea, etc.  Do NOT count diet or artificially sweetened beverages)?: 1  How many minutes a day do you exercise enough to make your heart beat faster?: 9 or less  How many days a week do you exercise enough to make your heart beat faster?: 3 or less  How many days per week do you miss taking your medication?: 0  Questionnaire about: Chronic problems general questions HPI Form (Submitted on 6/16/2025)  Chief Complaint: Chronic problems general questions HPI Form

## 2025-06-24 DIAGNOSIS — Z79.4 TYPE 2 DIABETES MELLITUS WITH CHRONIC KIDNEY DISEASE, WITH LONG-TERM CURRENT USE OF INSULIN, UNSPECIFIED CKD STAGE (H): ICD-10-CM

## 2025-06-24 DIAGNOSIS — E11.22 TYPE 2 DIABETES MELLITUS WITH CHRONIC KIDNEY DISEASE, WITH LONG-TERM CURRENT USE OF INSULIN, UNSPECIFIED CKD STAGE (H): ICD-10-CM

## 2025-06-24 RX ORDER — PIOGLITAZONE 30 MG/1
30 TABLET ORAL DAILY
Qty: 90 TABLET | Refills: 3 | Status: SHIPPED | OUTPATIENT
Start: 2025-06-24

## 2025-07-16 ENCOUNTER — TRANSFERRED RECORDS (OUTPATIENT)
Dept: HEALTH INFORMATION MANAGEMENT | Facility: CLINIC | Age: 80
End: 2025-07-16
Payer: MEDICARE

## 2025-07-23 ENCOUNTER — TELEPHONE (OUTPATIENT)
Dept: FAMILY MEDICINE | Facility: CLINIC | Age: 80
End: 2025-07-23
Payer: MEDICARE

## 2025-07-23 DIAGNOSIS — Z79.4 TYPE 2 DIABETES MELLITUS WITHOUT COMPLICATION, WITH LONG-TERM CURRENT USE OF INSULIN (H): ICD-10-CM

## 2025-07-23 DIAGNOSIS — E11.9 TYPE 2 DIABETES MELLITUS WITHOUT COMPLICATION, WITH LONG-TERM CURRENT USE OF INSULIN (H): ICD-10-CM

## 2025-07-23 RX ORDER — FLASH GLUCOSE SENSOR
KIT MISCELLANEOUS
Qty: 6 EACH | Refills: 1 | Status: SHIPPED | OUTPATIENT
Start: 2025-07-23

## 2025-07-23 NOTE — TELEPHONE ENCOUNTER
Hello,     This is [facility] requesting a few things for this patient's Medicare order of the FREESTYLE ESTRADA 14-DAY SENSORS     The first is requesting an addendum to the clinic notes from 6/17/25 to include PTS CGM USAGE per Medicare's guidelines below:     VISIT NOTE REQUIREMENTS: (must state the following)  Patient must be seen/clinical notes must be written in the last 6 months by the prescribing provider.  Must state that the patient is injecting insulin 1 time daily or more (Can be written that patient is injecting with insulin pump) We cannot accept medication list as insulin regimen.  Must state that the patient is a type 1 or type 2 diabetic.  Must state that patient is using CGM     As a reminder, Medicare requires patients to be seen every 3 months for insulin supplies and every 6 months for CGMs. The provider who sees the patient must be the provider on the prescription, must be written on the day of or after office visit date and office visit must include notes about diabetes and insulin regimen. The Diabetes Care Services team at [facility] may request new prescriptions before renewal dates of the prescription is filled over the allowable amount. Writing the order to match what is above will help ensure we will only need to request every 3 or 6 months.    If you have any questions regarding these requests please call us at [phone] or send a message to the [employer] pool     Thank you!     [facility]  Diabetes Care Services Team  [address] [place] [address]  Provider Phone: [phone]  Patient Line: [phone]  Fax: [phone]  [email or web address]: [email or web address]

## 2025-08-18 DIAGNOSIS — E11.22 TYPE 2 DIABETES MELLITUS WITH CHRONIC KIDNEY DISEASE, WITH LONG-TERM CURRENT USE OF INSULIN, UNSPECIFIED CKD STAGE (H): ICD-10-CM

## 2025-08-18 DIAGNOSIS — Z79.4 TYPE 2 DIABETES MELLITUS WITH CHRONIC KIDNEY DISEASE, WITH LONG-TERM CURRENT USE OF INSULIN, UNSPECIFIED CKD STAGE (H): ICD-10-CM

## 2025-08-18 RX ORDER — INSULIN GLARGINE 300 U/ML
20 INJECTION, SOLUTION SUBCUTANEOUS AT BEDTIME
Qty: 6 ML | Refills: 0 | Status: SHIPPED | OUTPATIENT
Start: 2025-08-18